# Patient Record
Sex: MALE | Race: WHITE
[De-identification: names, ages, dates, MRNs, and addresses within clinical notes are randomized per-mention and may not be internally consistent; named-entity substitution may affect disease eponyms.]

---

## 2019-10-01 ENCOUNTER — HOSPITAL ENCOUNTER (OUTPATIENT)
Dept: HOSPITAL 53 - M RAD | Age: 64
End: 2019-10-01
Attending: INTERNAL MEDICINE
Payer: MEDICARE

## 2019-10-01 DIAGNOSIS — R91.8: Primary | ICD-10-CM

## 2019-10-01 NOTE — REP
The CT of the chest without IV contrast:

Comparisons are the radionuclide PET scan dated 01/23/2018 and chest CT dated

04/03/2018.

On the comparison PET scan there was a hypermetabolic lung nodule medially in the

apex of the right upper lobe.

On the 04/03/2018 chest CT this nodule had been resected and there is a surgical

staple line medially in the right upper lobe apex.  Additionally there are

multiple bilateral pulmonary emboli.

The study today is insensitive for evaluation of pulmonary emboli in the absence

of IV contrast.

There is a surgical staple line medially in the apex of the right upper lobe.

There is no evidence of tumor recurrence.

In addition, there is linear stranding extending from the lateral pleural surface

in the right upper lobe, eight packs inferomedially to the right hilus, not

present previously, possibly parenchymal scarring.  No nodularity is identified.

 

There are no nodules or masses otherwise.  There are no infiltrates or pleural

effusions.

There are numerous bulla replacing the lung parenchyma bilaterally.

There is no mediastinal or axillary lymphadenopathy.  The study is insensitive

for hilar lymphadenopathy in the absence of IV contrast.

The thoracic aorta is unremarkable except for occasional calcified atheroma.

Cardiac size is normal.  There is no pericardial effusion.

Upper abdomen:

On the radionuclide PET scan dated 01/23/2089.  On the chest CT of 04/03/2018

there was a right adrenal hypodense nodule.  This is only partially imaged on the

study today.

The left adrenal demonstrates "fullness" but is only partially imaged.  This

fullness was also present on the comparison studies.

The visualized areas of the liver, gallbladder, pancreas and spleen are

unremarkable.

 

Impression:

The there is a surgical staple line in the upper lobe of the right lung medially

where a lung nodule had previously been resected.  There is no evidence of tumor

recurrence.

There is now linear stranding extending from the lateral pleura in the right

upper lobe toward the right hilus, not present previously, likely parenchymal

scarring.  No nodularity is associated with this linear stranding.

There are no new lung nodules or masses.  There are no infiltrates or pleural

effusions.  There is no adenopathy, however the study is insensitive for

evaluation of the paul in the absence of IV contrast.

There are numerous bulla replacing the lung parenchyma bilaterally.

 There is curvilinear parenchymal scarring in the left lower lobe.

The patient has a known right adrenal nodule. It is only partially imaged on the

current study.  There is left adrenal fullness, only partially imaged on the

current study.  This fullness was also present on  prior studies.

 

 

 

Electronically Signed by

Ady Slade MD 10/01/2019 01:41 P

## 2019-12-02 ENCOUNTER — HOSPITAL ENCOUNTER (OUTPATIENT)
Dept: HOSPITAL 53 - M LAB REF | Age: 64
End: 2019-12-02
Attending: DERMATOLOGY
Payer: MEDICARE

## 2019-12-02 DIAGNOSIS — D22.61: ICD-10-CM

## 2019-12-02 DIAGNOSIS — L57.8: ICD-10-CM

## 2019-12-02 DIAGNOSIS — D22.5: Primary | ICD-10-CM

## 2020-06-24 ENCOUNTER — HOSPITAL ENCOUNTER (OUTPATIENT)
Dept: HOSPITAL 53 - M WUC | Age: 65
End: 2020-06-24
Attending: PHYSICIAN ASSISTANT
Payer: MEDICARE

## 2020-06-24 DIAGNOSIS — Z00.00: Primary | ICD-10-CM

## 2020-06-24 LAB
ALBUMIN SERPL BCG-MCNC: 3.7 GM/DL (ref 3.2–5.2)
ALT SERPL W P-5'-P-CCNC: 20 U/L (ref 12–78)
BASOPHILS # BLD AUTO: 0.1 10^3/UL (ref 0–0.2)
BASOPHILS NFR BLD AUTO: 0.9 % (ref 0–1)
BILIRUB SERPL-MCNC: 0.8 MG/DL (ref 0.2–1)
BUN SERPL-MCNC: 19 MG/DL (ref 7–18)
CALCIUM SERPL-MCNC: 8.8 MG/DL (ref 8.8–10.2)
CHLORIDE SERPL-SCNC: 112 MEQ/L (ref 98–107)
CHOLEST SERPL-MCNC: 180 MG/DL (ref ?–200)
CHOLEST/HDLC SERPL: 4.09 {RATIO} (ref ?–5)
CO2 SERPL-SCNC: 24 MEQ/L (ref 21–32)
CREAT SERPL-MCNC: 1.04 MG/DL (ref 0.7–1.3)
EOSINOPHIL # BLD AUTO: 0.1 10^3/UL (ref 0–0.5)
EOSINOPHIL NFR BLD AUTO: 1.6 % (ref 0–3)
GFR SERPL CREATININE-BSD FRML MDRD: > 60 ML/MIN/{1.73_M2} (ref 49–?)
GLUCOSE SERPL-MCNC: 101 MG/DL (ref 70–100)
HCT VFR BLD AUTO: 45.3 % (ref 42–52)
HDLC SERPL-MCNC: 44 MG/DL (ref 40–?)
HGB BLD-MCNC: 16 G/DL (ref 13.5–17.5)
LDLC SERPL CALC-MCNC: 121 MG/DL (ref ?–100)
LYMPHOCYTES # BLD AUTO: 1.3 10^3/UL (ref 1.5–5)
LYMPHOCYTES NFR BLD AUTO: 18.1 % (ref 24–44)
MCH RBC QN AUTO: 34 PG (ref 27–33)
MCHC RBC AUTO-ENTMCNC: 35.3 G/DL (ref 32–36.5)
MCV RBC AUTO: 96.2 FL (ref 80–96)
MONOCYTES # BLD AUTO: 0.8 10^3/UL (ref 0–0.8)
MONOCYTES NFR BLD AUTO: 11.1 % (ref 0–5)
NEUTROPHILS # BLD AUTO: 4.8 10^3/UL (ref 1.5–8.5)
NEUTROPHILS NFR BLD AUTO: 68 % (ref 36–66)
NONHDLC SERPL-MCNC: 136 MG/DL
PLATELET # BLD AUTO: 219 10^3/UL (ref 150–450)
POTASSIUM SERPL-SCNC: 4.1 MEQ/L (ref 3.5–5.1)
PROT SERPL-MCNC: 6.9 GM/DL (ref 6.4–8.2)
RBC # BLD AUTO: 4.71 10^6/UL (ref 4.3–6.1)
SODIUM SERPL-SCNC: 142 MEQ/L (ref 136–145)
TRIGL SERPL-MCNC: 73 MG/DL (ref ?–150)
WBC # BLD AUTO: 7 10^3/UL (ref 4–10)

## 2020-10-07 ENCOUNTER — HOSPITAL ENCOUNTER (OUTPATIENT)
Dept: HOSPITAL 53 - M RAD | Age: 65
End: 2020-10-07
Attending: INTERNAL MEDICINE
Payer: MEDICARE

## 2020-10-07 DIAGNOSIS — R91.8: Primary | ICD-10-CM

## 2020-10-12 NOTE — REP
CT CHEST WITHOUT CONTRAST



HISTORY: Other nonspecific abnormal finding of lung field. 



COMPARISON: Chest CT studies from 10/01/2019 and 04/03/2018. PET CT study 
01/23/2018. 



CT FINDINGS: 

The patient is status post right thoracotomy and resection of right upper lobe 
pulmonary nodule. There are fibrotic changes in the right upper lobe and a 
suture line is seen superiorly and medially. There are fairly advanced 
emphysematous changes again noted. Bibasilar linear fibrotic changes are 
visible. There is no evidence of pleural effusion or pericardial effusion. 
Vascular calcification is noted. No mediastinal mass or adenopathy is seen. No 
hilar adenopathy is observed. Stable benign adrenal adenoma is noted on the 
right. Some stable fullness in the left adrenal gland is seen. These are 
unchanged. 



There is an area of pleura-based opacity in the right posterior lung gutter 2.3 
cm in greatest diameter. This was not apparent on the 10/01/2019 prior study. 
There is some adjacent infiltrate superior to this and these changes could be 
inflammatory. There is mild pleural thickening on the right. There is a pleural-
based nodule in the left lower lobe 9 mm in diameter. This was present on 
10/01/2019, but is a little larger. Previously, it measured 6 mm. No other 
nodular density is seen. There is a granulomatous calcification in the right 
upper lobe anteriorly. Stable perifissural nodule is seen in the minor fissure. 
 



IMPRESSION: 

There are two nodular opacities of concern. A new 2.3 cm somewhat nodular 
peripheral density is seen in the posterior lung gutter on the right question 
inflammatory change. In addition, there is a posterior pleural-based 9 mm nodule
in the left lower lobe, which is somewhat larger than on the 10/01/2019 study. 
Evidence of advanced chronic obstructive pulmonary disease (COPD). Postoperative
changes on the right.  

MTDD

## 2020-10-16 ENCOUNTER — HOSPITAL ENCOUNTER (INPATIENT)
Dept: HOSPITAL 53 - M ED | Age: 65
LOS: 3 days | Discharge: HOME | DRG: 193 | End: 2020-10-19
Attending: FAMILY MEDICINE | Admitting: INTERNAL MEDICINE
Payer: MEDICARE

## 2020-10-16 VITALS — SYSTOLIC BLOOD PRESSURE: 148 MMHG | DIASTOLIC BLOOD PRESSURE: 96 MMHG

## 2020-10-16 VITALS — DIASTOLIC BLOOD PRESSURE: 72 MMHG | SYSTOLIC BLOOD PRESSURE: 107 MMHG

## 2020-10-16 VITALS — BODY MASS INDEX: 22.95 KG/M2 | HEIGHT: 70 IN | WEIGHT: 160.34 LBS

## 2020-10-16 DIAGNOSIS — F17.200: ICD-10-CM

## 2020-10-16 DIAGNOSIS — Z86.711: ICD-10-CM

## 2020-10-16 DIAGNOSIS — Z90.49: ICD-10-CM

## 2020-10-16 DIAGNOSIS — J18.9: Primary | ICD-10-CM

## 2020-10-16 DIAGNOSIS — Z98.49: ICD-10-CM

## 2020-10-16 DIAGNOSIS — I50.9: ICD-10-CM

## 2020-10-16 DIAGNOSIS — Z20.828: ICD-10-CM

## 2020-10-16 DIAGNOSIS — J44.0: ICD-10-CM

## 2020-10-16 DIAGNOSIS — J96.91: ICD-10-CM

## 2020-10-16 DIAGNOSIS — Z90.2: ICD-10-CM

## 2020-10-16 DIAGNOSIS — Z79.899: ICD-10-CM

## 2020-10-16 DIAGNOSIS — J90: ICD-10-CM

## 2020-10-16 LAB
ALBUMIN SERPL BCG-MCNC: 2.3 GM/DL (ref 3.2–5.2)
ALT SERPL W P-5'-P-CCNC: 35 U/L (ref 12–78)
BASE EXCESS BLDV CALC-SCNC: -1.9 MMOL/L (ref -2–2)
BASOPHILS # BLD AUTO: 0.1 10^3/UL (ref 0–0.2)
BASOPHILS NFR BLD AUTO: 0.4 % (ref 0–1)
BILIRUB CONJ SERPL-MCNC: 0.9 MG/DL (ref 0–0.2)
BILIRUB SERPL-MCNC: 1.5 MG/DL (ref 0.2–1)
CO2 BLDV CALC-SCNC: 21.1 MEQ/L (ref 24–28)
EOSINOPHIL # BLD AUTO: 0 10^3/UL (ref 0–0.5)
EOSINOPHIL NFR BLD AUTO: 0.2 % (ref 0–3)
HCO3 BLDV-SCNC: 20.3 MEQ/L (ref 23–27)
HCO3 STD BLDV-SCNC: 22.9 MEQ/L
HCT VFR BLD AUTO: 37.4 % (ref 42–52)
HGB BLD-MCNC: 13.1 G/DL (ref 13.5–17.5)
INR PPP: 1.13
LYMPHOCYTES # BLD AUTO: 0.3 10^3/UL (ref 1.5–5)
LYMPHOCYTES NFR BLD AUTO: 2.6 % (ref 24–44)
MCH RBC QN AUTO: 32.7 PG (ref 27–33)
MCHC RBC AUTO-ENTMCNC: 35 G/DL (ref 32–36.5)
MCV RBC AUTO: 93.3 FL (ref 80–96)
MONOCYTES # BLD AUTO: 1.2 10^3/UL (ref 0–0.8)
MONOCYTES NFR BLD AUTO: 9.4 % (ref 0–5)
NEUTROPHILS # BLD AUTO: 11.5 10^3/UL (ref 1.5–8.5)
NEUTROPHILS NFR BLD AUTO: 86.7 % (ref 36–66)
NT-PRO BNP: 612 PG/ML (ref ?–125)
PCO2 BLDV: 28.1 MMHG (ref 38–50)
PH BLDV: 7.48 UNITS (ref 7.33–7.43)
PLATELET # BLD AUTO: 344 10^3/UL (ref 150–450)
PO2 BLDV: 140 MMHG (ref 30–50)
PROT SERPL-MCNC: 6 GM/DL (ref 6.4–8.2)
PROTHROMBIN TIME: 14.7 SECONDS (ref 12.5–14.3)
RBC # BLD AUTO: 4.01 10^6/UL (ref 4.3–6.1)
SAO2 % BLDV: 98.9 % (ref 60–80)
TSH SERPL DL<=0.005 MIU/L-ACNC: 0.48 UIU/ML (ref 0.36–3.74)
WBC # BLD AUTO: 13.3 10^3/UL (ref 4–10)

## 2020-10-16 RX ADMIN — CEFTRIAXONE SCH MLS/HR: 2 INJECTION, POWDER, FOR SOLUTION INTRAMUSCULAR; INTRAVENOUS at 14:15

## 2020-10-16 RX ADMIN — DEXTROSE MONOHYDRATE SCH MLS/HR: 5 INJECTION INTRAVENOUS at 15:15

## 2020-10-16 NOTE — REPVR
PROCEDURE INFORMATION: 

Exam: XR Chest, 1 View 

Exam date and time: 10/16/2020 12:18 PM 

Age: 65 years old 

Clinical indication: Shortness of breath; Additional info: Dyspnea/cough 



TECHNIQUE: 

Imaging protocol: XR of the chest 

Views: 1 view. 



COMPARISON: 

CT Chest without contrast 10/7/2020 12:58 PM 



FINDINGS: 

Limitations: Multiple EKG leads are superimposed on the chest. 



Lungs: The lungs are hyperinflated, consistent with underlying small airways 

disease. Subsegmental atelectasis is noted at the bases. There are prominent 

Kerley B lines suggesting interstitial edema. 

Pleural space: There is a small right pleural fluid collection present. 

Heart/Mediastinum: See "Soft tissues" finding. 

Bones/joints: Unremarkable. 

Soft tissues: An opacity of the right chest wall adjacent to the 4th rib shadow 

is related to chronic scarring and visible on previous  image and CT 

images. There is also evidence of surgery centrally at the right mediastinal 

margin.There is nonspecific consolidation in the right lung base, consistent 

with atelectasis or pneumonia. 



IMPRESSION: 

1. There is a small right pleural fluid collection present. 

2. There are prominent Kerley B lines suggesting interstitial edema. 

3. An opacity of the right chest wall adjacent to the 4th rib shadow is related 

to chronic scarring and visible on previous  image and CT images. There is 

also evidence of surgery centrally at the right mediastinal margin.There is 

nonspecific consolidation in the right lung base, consistent with atelectasis 

or pneumonia. 



Electronically signed by: Brando Dunlap On 10/16/2020  12:37:24 PM
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
## 2020-10-17 VITALS — SYSTOLIC BLOOD PRESSURE: 128 MMHG | DIASTOLIC BLOOD PRESSURE: 62 MMHG

## 2020-10-17 VITALS — DIASTOLIC BLOOD PRESSURE: 66 MMHG | SYSTOLIC BLOOD PRESSURE: 123 MMHG

## 2020-10-17 VITALS — DIASTOLIC BLOOD PRESSURE: 71 MMHG | SYSTOLIC BLOOD PRESSURE: 134 MMHG

## 2020-10-17 LAB
BUN SERPL-MCNC: 30 MG/DL (ref 7–18)
CALCIUM SERPL-MCNC: 9.1 MG/DL (ref 8.8–10.2)
CHLORIDE SERPL-SCNC: 110 MEQ/L (ref 98–107)
CO2 SERPL-SCNC: 23 MEQ/L (ref 21–32)
CREAT SERPL-MCNC: 0.74 MG/DL (ref 0.7–1.3)
GFR SERPL CREATININE-BSD FRML MDRD: > 60 ML/MIN/{1.73_M2} (ref 49–?)
GLUCOSE SERPL-MCNC: 151 MG/DL (ref 70–100)
HCT VFR BLD AUTO: 38.5 % (ref 42–52)
HGB BLD-MCNC: 13 G/DL (ref 13.5–17.5)
MAGNESIUM SERPL-MCNC: 1.9 MG/DL (ref 1.8–2.4)
MCH RBC QN AUTO: 32.2 PG (ref 27–33)
MCHC RBC AUTO-ENTMCNC: 33.8 G/DL (ref 32–36.5)
MCV RBC AUTO: 95.3 FL (ref 80–96)
PLATELET # BLD AUTO: 388 10^3/UL (ref 150–450)
POTASSIUM SERPL-SCNC: 3.7 MEQ/L (ref 3.5–5.1)
RBC # BLD AUTO: 4.04 10^6/UL (ref 4.3–6.1)
SODIUM SERPL-SCNC: 141 MEQ/L (ref 136–145)
WBC # BLD AUTO: 20.3 10^3/UL (ref 4–10)

## 2020-10-17 RX ADMIN — DEXTROSE MONOHYDRATE SCH MLS/HR: 5 INJECTION INTRAVENOUS at 14:01

## 2020-10-17 RX ADMIN — TIOTROPIUM BROMIDE SCH INHALATION: 18 CAPSULE ORAL; RESPIRATORY (INHALATION) at 07:30

## 2020-10-17 RX ADMIN — CEFTRIAXONE SCH MLS/HR: 2 INJECTION, POWDER, FOR SOLUTION INTRAMUSCULAR; INTRAVENOUS at 14:01

## 2020-10-17 RX ADMIN — DEXTROSE MONOHYDRATE SCH MLS/HR: 5 INJECTION INTRAVENOUS at 02:42

## 2020-10-17 RX ADMIN — ENOXAPARIN SODIUM SCH MG: 40 INJECTION SUBCUTANEOUS at 09:56

## 2020-10-17 RX ADMIN — FUROSEMIDE SCH MG: 40 TABLET ORAL at 09:55

## 2020-10-17 NOTE — IPNPDOC
Text Note


Date of Service


The patient was seen on 10/17/20.





NOTE


Subjective:


-Cough persists, however now back on room air


-Feels better





Objective:


General: Alert, No Acute Distress


Eye: PERRLA, Conjunctiva & lids normal, EOMI, anicteric


ENT: Atraumatic, Mucous membr. moist/pink, Pharynx Normal


Neck: Supple


Chest : Normal air movement, Rhonchi, diminished R base, no hoang crackles, no 

wheezing


Heart: Rate Normal, Regular Rhythm, Normal S1, Normal S2, systolic murmur


Abdomen: Normal bowel sounds, soft, NTND


Extremities: Normal pulses, WWP, no edema


Skin: Nl turgor and temperature, no rashes or breakdown


Neuro: Normal Speech, Cranial Nerves 3-12 NL, nonfocal grossly


Psych:  Alert and Oriented x 3








Laboratory Data


WBC 20.3 (after steroids in ED)


hgb 13


platelets 388


Cr 0.74





Microbiology


10/16/20 Respiratory Virus Panel (PCR) (LIT) - Final, Complete - negative


           


10/16/20 Blood Culture, NGTD


           


10/16/20 Blood Culture, NGTD





Assessment:


64 yo man long term smoker who has recently cut down, with COPD, history of a 

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 

provoked PE thereafter that was treated with short duration eliquis, who follows

with Dr. Echeverria in the outpatient setting who recently presented to clinic with 

cough and SOB and diagnosed with CAP and Dr. Echeverria had placed him on a 

quinolone but did not improve now being admitted for CAP for IV antibiotics 

having failed outpatient therapy.





CAP: SOB, cough, hypoxemia, CXR with RLL opacity and leukocytosis


-ceftriaxone/doxy, day 2


-respiratory panel was negative


-MRSA negative


-sputum cx if he expectorates


-incentive spirometry


-continue home metered dose inhalers


-hold off on steroids after ED's solumedrol


-f/u legionella and strep pneumo antigens


-supplemental oxygen, with goal saturation >88%


-add guaifenesin for cough





Hypoxemic respiratory failure: 2/2 ongoing CAP and CHF: resolved. now back on 

room air





Newly noted mild CHF: Elevated proBNP, CXR with interstitial edema, crackles on 

initial ED examination


-EKG was per baseline and non ischemic


-Troponin was negative


-s/p lasix 20 IV


-continue lasix 40 PO daily


-strict I/Os


-daily weight





Nicotine addiction:


-has cut down





DVT ppx: lovenox


Dispo: inpatient, med/surg





VS,Fishbone, I+O


VS, Fishbone, I+O


Laboratory Tests


10/16/20 11:52








10/17/20 07:09











Vital Signs








  Date Time  Temp Pulse Resp B/P (MAP) Pulse Ox O2 Delivery O2 Flow Rate FiO2


 


10/17/20 06:00 98.4 75 20 123/66 (85) 93 Room Air  














I&O- Last 24 Hours up to 6 AM 


 


 10/17/20





 06:00


 


Intake Total 1585 ml


 


Output Total 700 ml


 


Balance 885 ml

















ALYSSA REHMAN MD   Oct 17, 2020 09:11

## 2020-10-17 NOTE — ECGEPIP
[facility] - ED

                                       

                                       Test Date:    2020-10-16

Pat Name:     MONSERRAT HARKINS          Department:   

Patient ID:   [id number]                 Room:         -

Gender:       Male                     Technician:   

:          [id number]               Requested By: Janette Hodge 

Order Number: [id number]     Reading MD:   Glenn Cason

                                 Measurements

Intervals                              Axis          

Rate:         105                      P:            75

VT:           167                      QRS:          32

QRSD:         85                       T:            49

QT:           314                                    

QTc:          417                                    

                           Interpretive Statements

SINUS TACHYCARDIA

Low QRS complex voltage in the limb leads

Delayed anterior R wave progression

Similar to tracing done 17

Electronically Signed on 10- 7:45:08 EDT by Glenn Cason

## 2020-10-18 VITALS — SYSTOLIC BLOOD PRESSURE: 144 MMHG | DIASTOLIC BLOOD PRESSURE: 73 MMHG

## 2020-10-18 VITALS — DIASTOLIC BLOOD PRESSURE: 85 MMHG | SYSTOLIC BLOOD PRESSURE: 138 MMHG

## 2020-10-18 VITALS — DIASTOLIC BLOOD PRESSURE: 67 MMHG | SYSTOLIC BLOOD PRESSURE: 128 MMHG

## 2020-10-18 LAB
BUN SERPL-MCNC: 32 MG/DL (ref 7–18)
CALCIUM SERPL-MCNC: 8.7 MG/DL (ref 8.8–10.2)
CHLORIDE SERPL-SCNC: 111 MEQ/L (ref 98–107)
CO2 SERPL-SCNC: 24 MEQ/L (ref 21–32)
CREAT SERPL-MCNC: 0.75 MG/DL (ref 0.7–1.3)
GFR SERPL CREATININE-BSD FRML MDRD: > 60 ML/MIN/{1.73_M2} (ref 49–?)
GLUCOSE SERPL-MCNC: 91 MG/DL (ref 70–100)
HCT VFR BLD AUTO: 39.7 % (ref 42–52)
HGB BLD-MCNC: 13.5 G/DL (ref 13.5–17.5)
MCH RBC QN AUTO: 32.6 PG (ref 27–33)
MCHC RBC AUTO-ENTMCNC: 34 G/DL (ref 32–36.5)
MCV RBC AUTO: 95.9 FL (ref 80–96)
PLATELET # BLD AUTO: 427 10^3/UL (ref 150–450)
POTASSIUM SERPL-SCNC: 3.7 MEQ/L (ref 3.5–5.1)
RBC # BLD AUTO: 4.14 10^6/UL (ref 4.3–6.1)
SODIUM SERPL-SCNC: 141 MEQ/L (ref 136–145)
WBC # BLD AUTO: 11.9 10^3/UL (ref 4–10)

## 2020-10-18 RX ADMIN — CEFUROXIME AXETIL SCH MG: 500 TABLET ORAL at 20:44

## 2020-10-18 RX ADMIN — TIOTROPIUM BROMIDE SCH INHALATION: 18 CAPSULE ORAL; RESPIRATORY (INHALATION) at 07:24

## 2020-10-18 RX ADMIN — FUROSEMIDE SCH MG: 40 TABLET ORAL at 09:05

## 2020-10-18 RX ADMIN — DEXTROSE MONOHYDRATE SCH MLS/HR: 5 INJECTION INTRAVENOUS at 03:54

## 2020-10-18 RX ADMIN — CEFUROXIME AXETIL SCH MG: 500 TABLET ORAL at 11:11

## 2020-10-18 RX ADMIN — DOXYCYCLINE HYCLATE SCH MG: 100 TABLET, COATED ORAL at 20:45

## 2020-10-18 RX ADMIN — ENOXAPARIN SODIUM SCH MG: 40 INJECTION SUBCUTANEOUS at 09:08

## 2020-10-18 RX ADMIN — DOXYCYCLINE HYCLATE SCH MG: 100 TABLET, COATED ORAL at 09:05

## 2020-10-18 NOTE — IPNPDOC
Text Note


Date of Service


The patient was seen on 10/18/20.





NOTE


Subjective:


-Feels much better, cough is better, remains dry


-Now on room air and breathing comfortably





Objective:


General: Alert, No Acute Distress


Eye: PERRLA, Conjunctiva & lids normal, EOMI, anicteric


ENT: Atraumatic, Mucous membr. moist/pink, Pharynx Normal


Neck: Supple


Chest : Normal air movement, Rhonchi, diminished R base, no hoang crackles, no 

wheezing


Heart: Rate Normal, Regular Rhythm, Normal S1, Normal S2, systolic murmur


Abdomen: Normal bowel sounds, soft, NTND


Extremities: Normal pulses, WWP, no edema


Skin: Nl turgor and temperature, no rashes or breakdown


Neuro: Normal Speech, Cranial Nerves 3-12 NL, nonfocal grossly


Psych:  Alert and Oriented x 3








Laboratory Data


WBC 11.9


hgb 135


platelets 427


Cr 0.75





Microbiology


10/16/20 Respiratory Virus Panel (PCR) (LIT) - Final, Complete - negative


           


10/16/20 Blood Culture, NGTD


           


10/16/20 Blood Culture, NGTD





Assessment:


66 yo man long term smoker who has recently cut down, with COPD, history of a 

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 

provoked PE thereafter that was treated with short duration eliquis, who follows

with Dr. Echeverria in the outpatient setting who recently presented to clinic with 

cough and SOB and diagnosed with CAP and Dr. Echeverria had placed him on a 

quinolone but did not improve now being admitted for CAP for IV antibiotics 

having failed outpatient therapy.





CAP: SOB, cough, hypoxemia, CXR with RLL opacity and leukocytosis


-DC IV ceftriaxone/doxy and switch to ceftin/doxy PO. Day 3 of abx


-respiratory panel was negative


-MRSA negative


-sputum cx if he expectorates


-incentive spirometry


-continue home metered dose inhalers


-hold off on steroids after ED's solumedrol


-f/u legionella and strep pneumo antigens


-supplemental oxygen, with goal saturation >88%


-guaifenesin for cough





Hypoxemic respiratory failure: 2/2 ongoing CAP and CHF: resolved. now back on 

room air





Newly noted mild CHF: Elevated proBNP, CXR with interstitial edema, crackles on 

initial ED examination


-EKG was per baseline and non ischemic


-Troponin was negative


-s/p lasix 20 IV


-continue lasix 40 PO daily


-strict I/Os


-daily weight





Nicotine addiction:


-has cut down





DVT ppx: lovenox


Dispo: inpatient, med/surg. Likely DC home tomorrow after PT home safety eval 

and ambulatory saturation. Plan will be to complete a course of ceftin/doxy.





VS,Fishbone, I+O


VS, Fishbone, I+O


Laboratory Tests


10/18/20 06:59











Vital Signs








  Date Time  Temp Pulse Resp B/P (MAP) Pulse Ox O2 Delivery O2 Flow Rate FiO2


 


10/18/20 06:00 98.1 90 18 138/85 (102) 92 Room Air  


 


10/17/20 06:00        














I&O- Last 24 Hours up to 6 AM 


 


 10/18/20





 06:00


 


Intake Total 1920 ml


 


Output Total 1100 ml


 


Balance 820 ml

















ALYSSA REHMAN MD   Oct 18, 2020 08:38

## 2020-10-19 VITALS — SYSTOLIC BLOOD PRESSURE: 111 MMHG | DIASTOLIC BLOOD PRESSURE: 60 MMHG

## 2020-10-19 LAB
BACTERIA ID TEST ISLT QL CULT: (no result)
BACTERIA SPEC BFLD CULT: (no result)
BUN SERPL-MCNC: 26 MG/DL (ref 7–18)
CALCIUM SERPL-MCNC: 8.5 MG/DL (ref 8.8–10.2)
CHLORIDE SERPL-SCNC: 110 MEQ/L (ref 98–107)
CO2 SERPL-SCNC: 25 MEQ/L (ref 21–32)
CREAT SERPL-MCNC: 0.74 MG/DL (ref 0.7–1.3)
GFR SERPL CREATININE-BSD FRML MDRD: > 60 ML/MIN/{1.73_M2} (ref 49–?)
GLUCOSE SERPL-MCNC: 97 MG/DL (ref 70–100)
HCT VFR BLD AUTO: 40.5 % (ref 42–52)
HGB BLD-MCNC: 13.9 G/DL (ref 13.5–17.5)
MCH RBC QN AUTO: 32.8 PG (ref 27–33)
MCHC RBC AUTO-ENTMCNC: 34.3 G/DL (ref 32–36.5)
MCV RBC AUTO: 95.5 FL (ref 80–96)
PLATELET # BLD AUTO: 434 10^3/UL (ref 150–450)
POTASSIUM SERPL-SCNC: 4.2 MEQ/L (ref 3.5–5.1)
RBC # BLD AUTO: 4.24 10^6/UL (ref 4.3–6.1)
SODIUM SERPL-SCNC: 141 MEQ/L (ref 136–145)
WBC # BLD AUTO: 11.2 10^3/UL (ref 4–10)

## 2020-10-19 RX ADMIN — CEFUROXIME AXETIL SCH MG: 500 TABLET ORAL at 09:18

## 2020-10-19 RX ADMIN — DOXYCYCLINE HYCLATE SCH MG: 100 TABLET, COATED ORAL at 09:18

## 2020-10-19 RX ADMIN — TIOTROPIUM BROMIDE SCH INHALATION: 18 CAPSULE ORAL; RESPIRATORY (INHALATION) at 07:07

## 2020-10-19 RX ADMIN — ENOXAPARIN SODIUM SCH MG: 40 INJECTION SUBCUTANEOUS at 09:16

## 2020-10-19 RX ADMIN — FUROSEMIDE SCH MG: 40 TABLET ORAL at 09:17

## 2020-10-19 NOTE — DS
DATE OF ADMISSION:   10/16/2020

DATE OF DISCHARGE:   10/19/2020 



PRINCIPAL DIAGNOSIS:  Community-acquired pneumonia.



SECONDARY DIAGNOSES: 

* Mild congestive heart failure. 

* Hypoxemic respiratory failure requiring supplemental oxygen. 

* Nicotine abuse. 



PRIMARY CARE PROVIDER: Dr. Natalia Ander. 



HISTORY OF PRESENT ILLNESS: Adams Barron was admitted with acute community-
acquired pneumonia. Details are in the History and Physical from admission. 



HOSPITAL COURSE: The patient was admitted to a medical bed, treated with 
intravenous (IV) Rocephin and doxycycline, and he responded well to this. He had
some Kerley B lines on his chest x-ray for which he was given oral diuresis with
furosemide 40 mg daily. On the day of discharge, he feels ready to go home. He 
denies any chest pain or shortness of breath. He has walked in the hallway 
without any difficulty. His O2 saturation on room air is 93% at rest. I am still
waiting to hear back what with exertional it was, and I will dictate an addendum
if it was 88% or lower; essentially, he would require some exertional 
supplemental oxygen.  



DISCHARGE PHYSICAL EXAMINATION: 

GENERAL APPEARANCE: On the day of discharge, he is resting comfortably in no 
distress. Alert, conversant, and in no distress. 

VITAL SIGNS: Blood pressure 112/62, pulse 86, respiratory rate 18, O2 saturation
93%, afebrile 97.9 degrees. 

NECK: No jugular venous distention (JVD). 

LUNGS: Entirely clear. 

HEART: Regular rhythm. 

ABDOMEN: Soft and nontender. 

EXTREMITIES: No peripheral edema. 



LABORATORY DATA: Sodium 141, potassium 4.2, BUN 26, creatinine 0.7, glucose 97. 
White count 11.2, hemoglobin 13.9, platelets 434,000. The rapid urine for 
Legionella and strep pneumo that was ordered three days ago is still pending. 
Sputum cultures were negative. Blood cultures were negative. Respiratory panel 
was negative. 



DISPOSITION: The patient is discharged home in improved and stable condition. He
will follow-up with Dr. Natalia Andre in a week. His activity is as tolerated. 



DISCHARGE MEDICATIONS: 

* Albuterol 2 puffs q. 4 hours p.r.n. 

* Spiriva 1 inhalation daily. 

* Ceftin 500 mg b.i.d. for 5 days.

* Doxycycline 100 mg b.i.d. for 5 days. 



Stopping the furosemide as it does not look like he is in much congestive heart 
failure and there are no signs of this on exam.

ELLIS

## 2020-12-16 ENCOUNTER — HOSPITAL ENCOUNTER (OUTPATIENT)
Dept: HOSPITAL 53 - M RAD | Age: 65
End: 2020-12-16
Attending: INTERNAL MEDICINE
Payer: MEDICARE

## 2020-12-16 DIAGNOSIS — J44.9: ICD-10-CM

## 2020-12-16 DIAGNOSIS — R91.8: Primary | ICD-10-CM

## 2020-12-16 NOTE — REP
INDICATION:

ABNORMAL FINDING OF LUNG FIELD.



COMPARISON:

Comparison CT studies October 7, 2020 and October 1, 2019..



TECHNIQUE:

Helical scanning is acquired.  3 mm axial images are generated.  Coronal and sagittal

MPR and coronal MIP images are generated.



FINDINGS:

The previously noted pleural-based nodular opacity in the left lower lobe is again

seen measuring 7 mm today and, 8.6 mm previously.  This is essentially unchanged from

most recent study of October 7, 2020.  There is now a large opacity in the posterior

aspect of the right lower lobe consistent with an area of consolidation.  There are

inspissated endobronchial secretions in the right lower lobe and some bilateral lower

lobe bronchiectasis is present.  There is a small amount of right pleural fluid and

there is visceral and parietal pleural thickening adjacent to the fluid in the right

base consistent with proteinaceous etiology.  There are emphysematous changes as

before.  There is right apical pleuroparenchymal fibrosis which is unchanged.  No new

pulmonary nodule is seen.  Vascular calcification is noted.  No hilar or mediastinal

adenopathy is appreciated.  Low-density thickening of both adrenal glands is seen

unchanged from remote prior CT study of April 3, 2018 and consistent with adrenal

hyperplasia.  There are granulomatous calcifications in the liver.  There are stable

lymph nodes in the epicardial fat anterior to the cardiac silhouette.



IMPRESSION:

Progressive infiltrate like opacity right lower lobe with adjacent pleural effusion.

There is somewhat nodular visceral and parietal pleural thickening in the pleural

fluid collection indicating a proteinaceous see etiology.  Stable left lower lobe

pulmonary nodule.  Evidence of COPD unchanged.





<Electronically signed by Phill Loo > 12/16/20 2258

## 2020-12-24 ENCOUNTER — HOSPITAL ENCOUNTER (OUTPATIENT)
Dept: HOSPITAL 53 - M LAB REF | Age: 65
End: 2020-12-24
Attending: INTERNAL MEDICINE
Payer: MEDICARE

## 2020-12-24 DIAGNOSIS — R91.8: Primary | ICD-10-CM

## 2020-12-24 LAB
APTT BLD: 32.8 SECONDS (ref 24.2–38.5)
INR PPP: 0.95
PLATELET # BLD AUTO: 342 10^3/UL (ref 150–450)
PROTHROMBIN TIME: 12.9 SECONDS (ref 12.5–14.3)

## 2021-01-18 ENCOUNTER — HOSPITAL ENCOUNTER (OUTPATIENT)
Dept: HOSPITAL 53 - M IRPRO | Age: 66
End: 2021-01-18
Attending: INTERNAL MEDICINE
Payer: MEDICARE

## 2021-01-18 VITALS — SYSTOLIC BLOOD PRESSURE: 139 MMHG | DIASTOLIC BLOOD PRESSURE: 66 MMHG

## 2021-01-18 DIAGNOSIS — Z53.8: ICD-10-CM

## 2021-01-18 DIAGNOSIS — J90: Primary | ICD-10-CM

## 2021-01-18 LAB
ALBUMIN SERPL BCG-MCNC: 3.1 GM/DL (ref 3.2–5.2)
ALT SERPL W P-5'-P-CCNC: 31 U/L (ref 12–78)
BASOPHILS # BLD AUTO: 0.1 10^3/UL (ref 0–0.2)
BASOPHILS NFR BLD AUTO: 0.7 % (ref 0–1)
BILIRUB SERPL-MCNC: 0.6 MG/DL (ref 0.2–1)
BUN SERPL-MCNC: 17 MG/DL (ref 7–18)
CALCIUM SERPL-MCNC: 9.7 MG/DL (ref 8.8–10.2)
CHLORIDE SERPL-SCNC: 109 MEQ/L (ref 98–107)
CO2 SERPL-SCNC: 23 MEQ/L (ref 21–32)
CREAT SERPL-MCNC: 0.85 MG/DL (ref 0.7–1.3)
EOSINOPHIL # BLD AUTO: 0.1 10^3/UL (ref 0–0.5)
EOSINOPHIL NFR BLD AUTO: 0.5 % (ref 0–3)
GFR SERPL CREATININE-BSD FRML MDRD: > 60 ML/MIN/{1.73_M2} (ref 49–?)
GLUCOSE SERPL-MCNC: 103 MG/DL (ref 70–100)
HCT VFR BLD AUTO: 43.3 % (ref 42–52)
HGB BLD-MCNC: 14.9 G/DL (ref 13.5–17.5)
LDH SERPL L TO P-CCNC: 107 U/L (ref 87–241)
LYMPHOCYTES # BLD AUTO: 0.8 10^3/UL (ref 1.5–5)
LYMPHOCYTES NFR BLD AUTO: 8.1 % (ref 24–44)
MCH RBC QN AUTO: 32.4 PG (ref 27–33)
MCHC RBC AUTO-ENTMCNC: 34.4 G/DL (ref 32–36.5)
MCV RBC AUTO: 94.1 FL (ref 80–96)
MONOCYTES # BLD AUTO: 1 10^3/UL (ref 0–0.8)
MONOCYTES NFR BLD AUTO: 9.7 % (ref 0–5)
NEUTROPHILS # BLD AUTO: 8.2 10^3/UL (ref 1.5–8.5)
NEUTROPHILS NFR BLD AUTO: 80.3 % (ref 36–66)
PLATELET # BLD AUTO: 285 10^3/UL (ref 150–450)
POTASSIUM SERPL-SCNC: 4.1 MEQ/L (ref 3.5–5.1)
PROT SERPL-MCNC: 7.3 GM/DL (ref 6.4–8.2)
RBC # BLD AUTO: 4.6 10^6/UL (ref 4.3–6.1)
SODIUM SERPL-SCNC: 139 MEQ/L (ref 136–145)
WBC # BLD AUTO: 10.2 10^3/UL (ref 4–10)

## 2021-01-19 NOTE — REP
INDICATION:

PLEURAL EFFUSION.



COMPARISON:

CT chest 12/16/2020.



TECHNIQUE:

Real-time sonographic evaluation of posteroinferior right pleural space performed

prior to a scheduled ultrasound-guided drainage of pleural fluid collection.



FINDINGS:

An oval area of complex fluid is identified which is measuring smaller than the CT

exam, 3.1 x 0.9 x 0.9 cm.  Measurements on the CT exam were 6.7 x 2.2 cm and therefore

the fluid collection appears to have decreased in size.



IMPRESSION:

Decreased size of complex fluid collection in the right posteroinferior pleural space,

as discussed above.  We consulted with Dr. Echeverria.  Ultrasound-guided drainage is

canceled at this time.





<Electronically signed by Ady Rosenberg > 01/19/21 0920

## 2021-01-21 ENCOUNTER — HOSPITAL ENCOUNTER (OUTPATIENT)
Dept: HOSPITAL 53 - M RAD | Age: 66
End: 2021-01-21
Attending: INTERNAL MEDICINE
Payer: MEDICARE

## 2021-01-21 DIAGNOSIS — R91.8: ICD-10-CM

## 2021-01-21 DIAGNOSIS — J43.1: ICD-10-CM

## 2021-01-21 DIAGNOSIS — J90: Primary | ICD-10-CM

## 2021-01-21 NOTE — REP
INDICATION:

ABN FINDINGS OF LUNG FEILD PLEURAL EFFUSION EMPH. Panlobular emphysema.



COMPARISON:

Comparison is made with prior CT studies from December 16, 2020 and October 7, 2020.

Chest CT from October 1, 2019 also reviewed.  3 April 2018 prior CT study is also

reviewed..



TECHNIQUE:

Helical scanning is acquired.  3 mm axial images are generated.  Coronal and sagittal

MPR and coronal MIP images are generated.



FINDINGS:

The previously noted pleural based left lower lobe nodule is again seen 7 mm in

greatest diameter unchanged from prior studies.  It measured 7 mm on the April 3, 2018

study.



The previously noted oval-shaped pleural-based masslike opacity in the right lower

lobe is again seen.  This is new compared to the October 7, 2020 study.  Its

morphologic appearance suggests the possibility of rounded atelectasis.  There is

adjacent visceral and parietal pleural thickening and irregularly shaped low-density

fluid collection apparently in the pleural space.  The thickness of the parenchymal

density component of the right lower lobe lesion has increased 2 2.9 cm in

anteroposterior span from 1.7 cm December 16, 2020.  The pleural fluid component is

felt to be unchanged in overall size.  The parenchymal component as a more oval

rounded configuration.  No hilar or mediastinal mass or adenopathy is observed.  I

note that CT study from April 3, 2018 was positive for pulmonary embolism.



No other new pulmonary nodule or mass lesion is apparent.  There are extensive

emphysematous changes.  Pleuroparenchymal scarring is seen in the right lung apex

unchanged.



IMPRESSION:

Evolving a pleural based masslike area of consolidation in the right lower lobe with

adjacent loculated complex thick-walled pleural fluid collection.  Differential

possibilities include spiral atelectasis, pulmonary infarction (Bray's hump),

pneumonia with parapneumonic pleural fluid collection, and neoplasm.  There is a

stable pleural based 7 mm nodule in the left lower lobe.  Bronchiectasis and

inspissated endobronchial secretions are again noted in the lower lobes.





<Electronically signed by Phill Loo > 01/21/21 [id number]

## 2021-02-01 ENCOUNTER — HOSPITAL ENCOUNTER (OUTPATIENT)
Dept: HOSPITAL 53 - M LAB REF | Age: 66
End: 2021-02-01
Attending: INTERNAL MEDICINE
Payer: MEDICARE

## 2021-02-01 DIAGNOSIS — Z79.01: ICD-10-CM

## 2021-02-01 DIAGNOSIS — R91.8: Primary | ICD-10-CM

## 2021-02-01 LAB
APTT BLD: 34.6 SECONDS (ref 24.2–38.5)
INR PPP: 0.97
PLATELET # BLD AUTO: 312 10^3/UL (ref 150–450)
PROTHROMBIN TIME: 13.1 SECONDS (ref 12.5–14.3)

## 2021-02-04 ENCOUNTER — HOSPITAL ENCOUNTER (OUTPATIENT)
Dept: HOSPITAL 53 - M IRPRO | Age: 66
End: 2021-02-04
Attending: INTERNAL MEDICINE
Payer: MEDICARE

## 2021-02-04 VITALS — DIASTOLIC BLOOD PRESSURE: 55 MMHG | SYSTOLIC BLOOD PRESSURE: 112 MMHG

## 2021-02-04 DIAGNOSIS — R91.8: ICD-10-CM

## 2021-02-04 DIAGNOSIS — J90: Primary | ICD-10-CM

## 2021-02-04 LAB
ALBUMIN SERPL BCG-MCNC: 3.5 GM/DL (ref 3.2–5.2)
ALT SERPL W P-5'-P-CCNC: 19 U/L (ref 12–78)
AMYLASE FLD-CCNC: 42 U/L
APPEARANCE FLD: (no result)
BASOPHILS # BLD AUTO: 0.1 10^3/UL (ref 0–0.2)
BASOPHILS NFR BLD AUTO: 0.6 % (ref 0–1)
BILIRUB SERPL-MCNC: 0.8 MG/DL (ref 0.2–1)
BUN SERPL-MCNC: 21 MG/DL (ref 7–18)
CALCIUM SERPL-MCNC: 10 MG/DL (ref 8.8–10.2)
CHLORIDE SERPL-SCNC: 110 MEQ/L (ref 98–107)
CO2 SERPL-SCNC: 24 MEQ/L (ref 21–32)
COLOR PLR: (no result)
CREAT SERPL-MCNC: 0.95 MG/DL (ref 0.7–1.3)
EOSINOPHIL # BLD AUTO: 0 10^3/UL (ref 0–0.5)
EOSINOPHIL NFR BLD AUTO: 0.3 % (ref 0–3)
GFR SERPL CREATININE-BSD FRML MDRD: > 60 ML/MIN/{1.73_M2} (ref 49–?)
GLUCOSE SERPL-MCNC: 96 MG/DL (ref 70–100)
HCT VFR BLD AUTO: 46.4 % (ref 42–52)
HGB BLD-MCNC: 15.4 G/DL (ref 13.5–17.5)
LDH FLD L TO P-CCNC: (no result) U/L
LDH SERPL L TO P-CCNC: 120 U/L (ref 87–241)
LYMPHOCYTES # BLD AUTO: 1.1 10^3/UL (ref 1.5–5)
LYMPHOCYTES NFR BLD AUTO: 10.6 % (ref 24–44)
MCH RBC QN AUTO: 32.2 PG (ref 27–33)
MCHC RBC AUTO-ENTMCNC: 33.2 G/DL (ref 32–36.5)
MCV RBC AUTO: 97.1 FL (ref 80–96)
MONOCYTES # BLD AUTO: 1.3 10^3/UL (ref 0–0.8)
MONOCYTES NFR BLD AUTO: 12.9 % (ref 0–5)
NEUTROPHILS # BLD AUTO: 7.6 10^3/UL (ref 1.5–8.5)
NEUTROPHILS NFR BLD AUTO: 75.3 % (ref 36–66)
PH FLD: (no result) UNITS
PLATELET # BLD AUTO: 237 10^3/UL (ref 150–450)
POTASSIUM SERPL-SCNC: 4 MEQ/L (ref 3.5–5.1)
PROT SERPL-MCNC: 7.6 GM/DL (ref 6.4–8.2)
RBC # BLD AUTO: 4.78 10^6/UL (ref 4.3–6.1)
SODIUM SERPL-SCNC: 142 MEQ/L (ref 136–145)
SPECIMEN SOURCE FLD: (no result)
WBC # BLD AUTO: 10.2 10^3/UL (ref 4–10)

## 2021-02-04 NOTE — REP
INDICATION:

OTHER NON SPECIFIC ABNORMAL FINDINGS OF LUNG FIELD.



COMPARISON:

None.



TECHNIQUE:

The procedure was performed under the direct supervision of Dr. Loo.



The patient has a history of an evolving pleural base mass like area of consolidation

in the right lower lobe with adjacent loculated complex thick-walled pleural fluid

collection seen on a previous CT scan dated 01/21/2021.



The risks and benefits of the procedure were explained to the patient and informed

consent was obtained.



The right lower lobe lung mass was localized using CT guidance.  The skin was prepped

and draped in a sterile fashion.  1% lidocaine was used as a local anesthetic.  Using

CT guidance a 19/20 gauge coaxial needle biopsy system was inserted and advanced into

the mass.  Five core biopsy samples were obtained.  15 cc of thick red colored fluid

was also withdrawn.  All samples were sent to the lab for analysis.



The patient tolerated the procedure well and there were no immediate complications.

After the appropriate amount to monitor convalescence the patient was discharged from

the department.



FINDINGS:

None



IMPRESSION:

Technically successful CT-guided right lower lobe lung biopsy.



<Electronically signed by Roberto Kent > 02/04/21 1602

<Electronically signed by Phill Loo > 02/04/21 [id number]

## 2021-02-04 NOTE — REP
INDICATION:

POST RIGHT THORA, 2 VIEW.



COMPARISON:

Comparison chest x-ray October 16, 2020..



TECHNIQUE:

PA and lateral views of the chest.



FINDINGS:

There is slight blunting of the right lateral pleural angle and there is blunting and

increased density in the posterior aspect of the right chest on lateral radiograph.

This was the target of today's CT guided biopsy.  There is no evidence of

pneumothorax.  There is a healing fracture of the right posterolateral 6th rib.  Heart

is not enlarged.  Left lung is unchanged and clear.



IMPRESSION:

There is no evidence of pneumothorax or other complication.





<Electronically signed by Phill Loo > 02/04/21 [id number]

## 2021-02-08 ENCOUNTER — HOSPITAL ENCOUNTER (OUTPATIENT)
Dept: HOSPITAL 53 - M RAD | Age: 66
End: 2021-02-08
Attending: NURSE PRACTITIONER
Payer: MEDICARE

## 2021-02-08 DIAGNOSIS — R91.8: Primary | ICD-10-CM

## 2021-02-08 DIAGNOSIS — J43.1: ICD-10-CM

## 2021-02-08 NOTE — REP
INDICATION:

PANLOBULAR EMPHYSEMA



COMPARISON:

10/16/2020



TECHNIQUE:

PA and lateral.



FINDINGS:

Right hilar and right lower lobe opacity noted.  Underlying COPD/emphysematous changes

are appreciated.  The cardiac silhouette is normal.  No effusion.  No pneumothorax.

Skeletal structures intact.



IMPRESSION:

Right perihilar/right lower lobe opacity suggesting consolidation and/or mass.





<Electronically signed by Dinh Spann > 02/08/21 [id number]

## 2021-02-10 ENCOUNTER — HOSPITAL ENCOUNTER (INPATIENT)
Dept: HOSPITAL 53 - M ED | Age: 66
LOS: 5 days | Discharge: HOME | DRG: 178 | End: 2021-02-15
Attending: INTERNAL MEDICINE | Admitting: INTERNAL MEDICINE
Payer: MEDICARE

## 2021-02-10 VITALS — DIASTOLIC BLOOD PRESSURE: 79 MMHG | SYSTOLIC BLOOD PRESSURE: 136 MMHG

## 2021-02-10 VITALS — WEIGHT: 143.74 LBS | BODY MASS INDEX: 20.58 KG/M2 | HEIGHT: 70 IN

## 2021-02-10 DIAGNOSIS — Z87.442: ICD-10-CM

## 2021-02-10 DIAGNOSIS — Z86.711: ICD-10-CM

## 2021-02-10 DIAGNOSIS — I50.9: ICD-10-CM

## 2021-02-10 DIAGNOSIS — Z90.2: ICD-10-CM

## 2021-02-10 DIAGNOSIS — J85.1: Primary | ICD-10-CM

## 2021-02-10 DIAGNOSIS — R91.8: ICD-10-CM

## 2021-02-10 DIAGNOSIS — E46: ICD-10-CM

## 2021-02-10 DIAGNOSIS — Z98.49: ICD-10-CM

## 2021-02-10 DIAGNOSIS — T36.0X5A: ICD-10-CM

## 2021-02-10 DIAGNOSIS — K52.1: ICD-10-CM

## 2021-02-10 DIAGNOSIS — F17.200: ICD-10-CM

## 2021-02-10 DIAGNOSIS — J43.9: ICD-10-CM

## 2021-02-10 DIAGNOSIS — Z90.49: ICD-10-CM

## 2021-02-10 DIAGNOSIS — J96.11: ICD-10-CM

## 2021-02-10 DIAGNOSIS — B95.5: ICD-10-CM

## 2021-02-10 LAB
ALBUMIN SERPL BCG-MCNC: 2.5 GM/DL (ref 3.2–5.2)
ALT SERPL W P-5'-P-CCNC: 43 U/L (ref 12–78)
BASOPHILS # BLD AUTO: 0 10^3/UL (ref 0–0.2)
BASOPHILS NFR BLD AUTO: 0.4 % (ref 0–1)
BILIRUB CONJ SERPL-MCNC: 0.1 MG/DL (ref 0–0.2)
BILIRUB SERPL-MCNC: 0.4 MG/DL (ref 0.2–1)
BUN SERPL-MCNC: 30 MG/DL (ref 7–18)
CALCIUM SERPL-MCNC: 8.6 MG/DL (ref 8.8–10.2)
CHLORIDE SERPL-SCNC: 106 MEQ/L (ref 98–107)
CK MB CFR.DF SERPL CALC: 2.26
CK MB SERPL-MCNC: 1.2 NG/ML (ref ?–3.6)
CK SERPL-CCNC: 53 U/L (ref 39–308)
CO2 SERPL-SCNC: 21 MEQ/L (ref 21–32)
CREAT SERPL-MCNC: 0.96 MG/DL (ref 0.7–1.3)
EOSINOPHIL # BLD AUTO: 0.1 10^3/UL (ref 0–0.5)
EOSINOPHIL NFR BLD AUTO: 1.4 % (ref 0–3)
GFR SERPL CREATININE-BSD FRML MDRD: > 60 ML/MIN/{1.73_M2} (ref 49–?)
GLUCOSE SERPL-MCNC: 94 MG/DL (ref 70–100)
HCT VFR BLD AUTO: 39.5 % (ref 42–52)
HGB BLD-MCNC: 13.5 G/DL (ref 13.5–17.5)
INR PPP: 1.03
LYMPHOCYTES # BLD AUTO: 0.9 10^3/UL (ref 1.5–5)
LYMPHOCYTES NFR BLD AUTO: 11 % (ref 24–44)
MCH RBC QN AUTO: 31.8 PG (ref 27–33)
MCHC RBC AUTO-ENTMCNC: 34.2 G/DL (ref 32–36.5)
MCV RBC AUTO: 92.9 FL (ref 80–96)
MONOCYTES # BLD AUTO: 1.2 10^3/UL (ref 0–0.8)
MONOCYTES NFR BLD AUTO: 14.4 % (ref 0–5)
NEUTROPHILS # BLD AUTO: 5.8 10^3/UL (ref 1.5–8.5)
NEUTROPHILS NFR BLD AUTO: 72.1 % (ref 36–66)
NT-PRO BNP: 223 PG/ML (ref ?–125)
PLATELET # BLD AUTO: 254 10^3/UL (ref 150–450)
POTASSIUM SERPL-SCNC: 4.2 MEQ/L (ref 3.5–5.1)
PROT SERPL-MCNC: 6.2 GM/DL (ref 6.4–8.2)
PROTHROMBIN TIME: 13.7 SECONDS (ref 12.5–14.3)
RBC # BLD AUTO: 4.25 10^6/UL (ref 4.3–6.1)
SODIUM SERPL-SCNC: 136 MEQ/L (ref 136–145)
T4 SERPL-MCNC: 8.5 UG/DL (ref 4.5–12)
TROPONIN I SERPL-MCNC: < 0.02 NG/ML (ref ?–0.1)
TSH SERPL DL<=0.005 MIU/L-ACNC: 0.5 UIU/ML (ref 0.36–3.74)
WBC # BLD AUTO: 8.1 10^3/UL (ref 4–10)

## 2021-02-10 RX ADMIN — DEXTROSE MONOHYDRATE SCH MLS/HR: 5 INJECTION INTRAVENOUS at 23:20

## 2021-02-10 NOTE — CCD
Continuity of Care Document (CCD)

                             Created on: 2020



Adams Barron

External Reference #: [id number]

: 1955

Sex: Male



Demographics





                          Address                   [address] [place]  [address]

 

                          Home Phone                [phone]

 

                          Preferred Language        Unknown

 

                          Marital Status            Unknown

 

                          Mosque Affiliation     Unknown

 

                          Race                      White

 

                          Ethnic Group              Not  or 





Author





                          Author                    Adams ANDRE M.D.

 

                          Organization              Unknown

 

                          Address                   [address]

[place]  [address]



 

                          Phone                     [phone]







Care Team Providers





                    Care Team Member Name Role                Phone

 

                    [facility] - D[facility]            

    [phone]

 

                    [facility] [place] - Urology [facility]                [phone]

 

                    Alan Humphries MD [facility]                [phone]

 

                    [facility]               

 [phone]







Problems





                    Active Problems     Provider            Date

 

                    Benign essential hypertension Natalia Andre M.D. Onset

: 2012







Social History





                Type            Date            Description     Comments

 

                Birth Sex                       Unknown          

 

                Tobacco Use     Start: Unknown End: Unknown Current Cigarette Sm

oker Packs Daily 1  

 

                Tobacco Use     Start: Unknown  Never Smoked Cigars  

 

                Tobacco Use     Start: Unknown  Never Smoked A Pipe  

 

                Tobacco Use     Start: Unknown  Never Used Smokeless Tobacco  

 

                ETOH Use                        Denies alcohol use  

 

                Recreational Drug Use                 Denies Drug Use  

 

                Tobacco Use     Start: Unknown End:  Patient is a former

 smoker  

 

                Smoking Status  Reviewed: 20 Patient is a former smoker  

 

                Exercise Type/Frequency                 Exercises sporadically  

 

                Seat Belt/Car Seat                 Always uses seat belt  







Allergies, Adverse Reactions, Alerts





             Active Allergies Reaction     Severity     Comments     Date

 

             Bactrim                                             2015

 

                                        Inactive Allergies

 

             NKDA                                                2004







Medications





           Active Medications SIG        Qnty       Indications Ordering Provide

r Date

 

                          Spiriva Respimat                     2.5mcg/Act Aeroso

l                   inhale

2 spray(s) by mouth once daily 4units                          Natalia Andre M.D. 2018

 

                          Proair HFA                     108(90Base) mcg/Act Aer

osol                   2 

puffs every 4 hours as needed as needed for cough or sob 8.500gm                

                 Natalia Andre M.D.                          

 

                          Vitamin D-3                     1000Unit Capsules     

              1 by mouth 

every day                                       Unknown         

 

                                        History Medications

 

                          Ceftin                     500mg Tablets              

     1 by mouth twice a 

day x 5 days                                    Unknown         10/19/2020 - 10/



 

                          Doxycycline Hyclate                     100mg Tablets 

                  1 tab by

mouth twice a day for 5 days                                 Unknown         10/

 - 10/24/2020







Immunizations





             CPT Code     Status       Date         Vaccine      Lot #

 

                [id number]           Given           10/18/2020      Influenza Virus 

Vaccine, Quadrivalent,age 3 and 

up,multidose vial                        

 

             62354        Given        2020   Pneumococcal Vaccine [id number]

 

                61149           Given           10/09/2019      Influenza Virus 

Vaccine, Quadrivalent,age 3 and 

up,multidose vial                        

 

             07095        Given        2019   Prevnar 13 For Adults [id number]

 

                86806           Given           2018      Influenza Virus,

 quadravalent, preservative free,age 3 

and up                                  [id number]

 

                93031           Given           2018      Influenza Virus 

Vaccine, Quadrivalent,age 3 and 

up,multidose vial                        

 

             14079        Given        2015   Influenza Vaccination  

 

             84482        Given        2015   Influenza Vaccination [id number]

A

 

             84351        Given        2015   Adacel 11 Yrs or older 

AA







Vital Signs





                Date            Vital           Result          Comment

 

                2020 11:53am BP Systolic     153 mmHg         

 

                    BP Diastolic        85 mmHg              

 

                    BP Systolic Recheck 149 mmHg            recheck

 

                    BP Diastolic Recheck 85 mmHg             recheck

 

                    Heart Rate          88 /min              

 

                    Body Temperature    97.3 F             

 

                    Respiratory Rate    18 /min              

 

                    Height              70 inches           5'10"

 

                    Weight              153.38 lb            

 

                    O2 % BldC Oximetry  98 %                 

 

                    Peak Expiratory Flow Rate 505                 Estimated Peak

 Flow Rate

 

                    Ideal Body Weight   166 lb               

 

                    BMI (Body Mass Index) 22.0 kg/m2           

 

                10/27/2020  9:01am BP Systolic     150 mmHg         

 

                    BP Diastolic        82 mmHg              

 

                    BP Systolic Recheck 124 mmHg             

 

                    BP Diastolic Recheck 81 mmHg              

 

                    Heart Rate          111 /min             

 

                    Body Temperature    96.6 F             

 

                    Respiratory Rate    20 /min              

 

                    Height              70 inches           5'10"

 

                    Weight              150.12 lb            

 

                    O2 % BldC Oximetry  97 %                 

 

                    Peak Expiratory Flow Rate 505                 Estimated Peak

 Flow Rate

 

                    Ideal Body Weight   166 lb               

 

                    BMI (Body Mass Index) 21.5 kg/m2           







Results





        Test    Acquired Date Facility Test    Result  H/L     Range   Note

 

                    Istat Chem8+ Panel  10/16/2020          [facility]

[facility]

[facility] [place] [id number] [phone] iSTAT HCT  65.0 %     High       38.0-51.0   

 

             iSTAT Glucose 154 mg/dL    High                 

 

             iSTAT Sodium 140 mEq/L    Normal       136-145       

 

             iSTAT Potassium 3.4 mEq/L    Low          3.5-5.1       

 

             iSTAT CA++   5.0 mg/dL    Normal       4.5-5.3       

 

             iSTAT Chloride 104 mEq/L    Normal               

 

             iSTAT Co2    19.0 MM/L    Low          23.0-27.0     

 

             iSTAT BUN    24 mg/dL     Normal       8-26          

 

             iSTAT Creatinine 0.6 mg/dL    Normal       0.6-1.3       

 

                    Laboratory test finding 10/16/2020          [facility]

[place] [address]

           [phone] iSTAT Troponin 0.00 NG/ML Normal     0.00-0.08   

 

                    Laboratory test finding 10/16/2020          [facility] 

[place]

[place] [address] [phone] iSTAT Lactate 1.04       Normal     0.4-2.0     

 

                    Venous Blood Gas    10/16/2020          [facility]

[place]

[place] [address] [phone] Venous PH  7.476 units High       7.330-7.430  

 

             Venous Partial Pressure Co2 28.1 mmHg    Low          38.0-50.0    

 

 

             Venous Partial Pressure O2 140.0 mmHg   High         30.0-50.0     

 

             Venous Total Co2 21.1 mEq/L   Low          24.0-28.0     

 

             Venous Hco3  20.3 mEq/L   Low          23.0-27.0     

 

             Venous Base Excess -1.9         Normal       -2.0-2.0      

 

             Venous Standard Hco3 22.9 mEq/L   Normal                     

 

             Venous O2 Saturation 98.9 %       High         60.0-80.0     

 

                    CBC With Differential 10/16/2020          [facility]

[place]ter

[place] [address] [phone] White Blood Count 13.3 10    High       4.0-10.0    

 

             Red Blood Count 4.01 10      Low          4.30-6.10     

 

             Hemoglobin   13.1 g/dL    Low          13.5-17.5     

 

             Hematocrit   37.4 %       Low          42.0-52.0     

 

             Mean Corpuscular Volume 93.3 fl      Normal       80.0-96.0     

 

             Mean Corpuscular Hemoglobin 32.7 pg      Normal       27.0-33.0    

 

 

             Mean Corpuscular HGB Conc 35.0 g/dL    Normal       32.0-36.5     

 

             Red Cell Distribution Width 12.4 %       Normal       11.5-14.5    

 

 

             Platelet Count, Automated 344 10       Normal       150-450       

 

             Neutrophils % 86.7 %       High         36.0-66.0     

 

             Lymph %      2.6 %        Low          24.0-44.0     

 

             Mono %       9.4 %        High         0.0-5.0       

 

             Eos %        0.2 %        Normal       0.0-3.0       

 

             Baso %       0.4 %        Normal       0.0-1.0       

 

             Immature Granulocyte % 0.7 %        Normal       0-3.0         

 

             Nucleated Red Blood Cell % 0.0 %        Normal       0-0           

 

             Neutrophils # 11.5 10      High         1.5-8.5       

 

             Lymph #      0.3 10       Low          1.5-5.0       

 

             Mono #       1.2 10       High         0.0-0.8       

 

             Eos #        0.0 10       Normal       0.0-0.5       

 

             Baso #       0.1 10       Normal       0.0-0.2       

 

                    Prothrombin Time/Inr 10/16/2020          [place] [address] [phone] Prothrombin Time 14.7 seconds High       12.5-14.3   

 

             Inr          1.13         Normal                    1

 

                    Liver Profile       10/16/2020          [place] [address] [phone] Ast/Sgot   24 U/L     Normal     7-37        

 

             Alt/SGPT     35 U/L       Normal       12-78         

 

             Alkaline Phosphatase 105 U/L      Normal               

 

             Bilirubin,Total 1.5 mg/dL    High         0.2-1.0       

 

             Bilirubin,Direct 0.9 mg/dL    High         0.0-0.2       

 

             Total Protein 6.0 GM/DL    Low          6.4-8.2       

 

             Albumin      2.3 GM/DL    Low          3.2-5.2       

 

             Albumin/Globulin Ratio 0.6          Normal                     

 

                    Laboratory test finding 10/16/2020          Patient Service 

[place] [address] [phone] NT-Pro  pg/mL  High       <125        

 

             Thyroid Stimulating Hormone 0.480 uIU/ML Normal       0.358-3.740  

 

 

                    Venous Blood Gas    10/16/2020          [facility]

er

[facility]

[place] [address]

           [phone] Venous PH  7.476 units High       7.330-7.430  

 

             Venous Partial Pressure Co2 28.1 mmHg    Low          38.0-50.0    

 

 

             Venous Partial Pressure O2 140.0 mmHg   High         30.0-50.0     

 

             Venous Total Co2 21.1 mEq/L   Low          24.0-28.0     

 

             Venous Hco3  20.3 mEq/L   Low          23.0-27.0     

 

             Venous Base Excess -1.9         Normal       -2.0-2.0      

 

             Venous Standard Hco3 22.9 mEq/L   Normal                     

 

             Venous O2 Saturation 98.9 %       High         60.0-80.0     

 

                    CBC With Differential 2020          [facility]

           [phone] White Blood Count 7.0 10     Normal     4.0-10.0    

 

             Red Blood Count 4.71 10      Normal       4.30-6.10     

 

             Hemoglobin   16.0 g/dL    Normal       13.5-17.5     

 

             Hematocrit   45.3 %       Normal       42.0-52.0     

 

             Mean Corpuscular Volume 96.2 fl      High         80.0-96.0     

 

             Mean Corpuscular Hemoglobin 34.0 pg      High         27.0-33.0    

 

 

             Mean Corpuscular HGB Conc 35.3 g/dL    Normal       32.0-36.5     

 

             Red Cell Distribution Width 12.7 %       Normal       11.5-14.5    

 

 

             Platelet Count, Automated 219 10       Normal       150-450       

 

             Neutrophils % 68.0 %       High         36.0-66.0     

 

             Lymph %      18.1 %       Low          24.0-44.0     

 

             Mono %       11.1 %       High         0.0-5.0       

 

             Eos %        1.6 %        Normal       0.0-3.0       

 

             Baso %       0.9 %        Normal       0.0-1.0       

 

             Immature Granulocyte % 0.3 %        Normal       0-3.0         

 

             Nucleated Red Blood Cell % 0.0 %        Normal       0-0           

 

             Neutrophils # 4.8 10       Normal       1.5-8.5       

 

             Lymph #      1.3 10       Low          1.5-5.0       

 

             Mono #       0.8 10       Normal       0.0-0.8       

 

             Eos #        0.1 10       Normal       0.0-0.5       

 

             Baso #       0.1 10       Normal       0.0-0.2       

 

                    Comprehensive Metabolic Profil 2020          [facility]

           [phone] Glucose, Fasting 101 mg/dL  High             

 

             Blood Urea Nitrogen 19 mg/dL     High         7-18          

 

             Creatinine For GFR 1.04 mg/dL   Normal       0.70-1.30     

 

             Glomerular Filtration Rate > 60.0       Normal       >49          2

 

             Sodium Level 142 mEq/L    Normal       136-145       

 

             Potassium Serum 4.1 mEq/L    Normal       3.5-5.1       

 

             Chloride Level 112 mEq/L    High                 

 

             Carbon Dioxide Level 24 mEq/L     Normal       21-32         

 

             Anion Gap    6 mEq/L      Low          8-16          

 

             Calcium Level 8.8 mg/dL    Normal       8.8-10.2      

 

             Ast/Sgot     12 U/L       Normal       7-37          

 

             Alt/SGPT     20 U/L       Normal       12-78         

 

             Alkaline Phosphatase 72 U/L       Normal               

 

             Bilirubin,Total 0.8 mg/dL    Normal       0.2-1.0       

 

             Total Protein 6.9 GM/DL    Normal       6.4-8.2       

 

             Albumin      3.7 GM/DL    Normal       3.2-5.2       

 

             Albumin/Globulin Ratio 1.2          Normal                     

 

                    Lipid Panel         2020          [facility]

nter

           [phone] Triglycerides Level 73 mg/dL   Normal     <150        

 

             Cholesterol Level 180 mg/dL    Normal       <200          

 

             HDL Cholesterol 44 mg/dL     Normal       >40           

 

             LDL Cholesterol 121 mg/dL    High         <100          

 

             Non-HDL-C    136 mg/dL    Normal                     

 

             Cholesterol Risk Ratio 4.090        Normal       <5            







                          1                         THERAPUTIC HUMAN INR VALUES

INDICATIONS                      NORMAL RANGES

PROPHYLAXIS/TREATMENT OF:

VENOUS THROMBOSIS                2.0-3.0

PULMONARY EMBOLISM               2.0-3.0

PREVENTION OF SYSTEMIC EMBOLISM FROM:

TISSUE HEART VALVES              2.0-3.0

ACUTE MYOCARDIAL INFARCTION      2.0-3.0

VALVULAR HEART DISEASE           2.0-3.0

ATRIAL FIBRILLATION              2.0-3.0

MECHANICAL VALVES(HIGH RISK)     2.5-3.5

RECURRENT MYOCARDIAL INFARCTION  2.5-3.5



 

                          2                         Units are mL/min/1.73 m2



Chronic Kidney Disease Staging per NKF:



Stage I & II   GFR >=60       Normal to Mildly Decreased

Stage III      GFR 30-59      Moderately Decreased

Stage IV       GFR 15-29      Severely Decreased

Stage V        GFR <15        Very Little GFR Left

ESRD           GFR <15 on RRT









Procedures





                                        Description

 

                                        No Information Available







Medical Devices





                                        Description

 

                                        No Information Available







Encounters





           Type       Date       Location   Provider   Dx         Diagnosis

 

           Office Visit 10/27/2020  9:00a Main Office Natalia Andre M.D. J

44.9      

Chronic obstructive pulmonary disease, unspecified

 

                          J18.9                     Pneumonia, unspecified organ

ism







Assessments





                Date            Code            Description     Provider

 

                2020      R03.0           Elevated blood-pressure reading,

 without diagnosis of hypert 

Natalia Andre M.D.

 

                2020      J44.9           Chronic obstructive pulmonary di

sease, unspecified Natalia Andre M.D.

 

                10/27/2020      J44.9           Chronic obstructive pulmonary di

sease, unspecified Natalia Andre M.D.

 

                10/27/2020      J18.9           Pneumonia, unspecified organism 

Natalia Andre M.D.







Plan of Treatment

2020 - Natalia Andre M.D.* R03.0 Elevated blood-pressure reading, 
  without diagnosis of hypert

* J44.9 Chronic obstructive pulmonary disease, unspecified* Follow up:* 3-4 
  months









Functional Status





                Functional Condition Comment         Date            Status

 

                Bifocal glasses                                 Active

 

                Independent with all ADL's                                 Activ

e

 

                Independent with all IADL's                                 Acti

ve

 

                Hearing Aid in both ears                                 Active







Mental Status





                Mental Condition Comment         Date            Status

 

                None                                            Active







Referrals





                                        Description

 

                                        No Information Available

## 2021-02-10 NOTE — ECGEPIP
[facility] - ED

                                       

                                       Test Date:    2021-02-10

Pat Name:     MONSERRAT HARKINS          Department:   

Patient ID:   [id number]                 Room:         -

Gender:       Male                     Technician:   [email or web address]

:          1955               Requested By: CLAUDIA CROUCH

Order Number: [id number]     Reading MD:   Andrés Deleon

                                 Measurements

Intervals                              Axis          

Rate:         98                       P:            85

MS:           166                      QRS:          56

QRSD:         95                       T:            63

QT:           310                                    

QTc:          396                                    

                           Interpretive Statements

SINUS RHYTHM WITH OCCASIONAL SUPRAVENTRICULAR PREMATURE COMPLEXES

POOR R WAVE PROGRESSION

SIMILAR TO 10/16/20

Electronically Signed on 2- 19:58:31 EST by Andrés Deleon

## 2021-02-10 NOTE — CCD
Continuity of Care Document (CCD)

                             Created on: 2021



Adams Barron

External Reference #: [id number]

: 1955

Sex: Male



Demographics





                          Address                   [address] [place] [address]

 

                          Home Phone                [phone]

 

                          Preferred Language        Unknown

 

                          Marital Status            Unknown

 

                          Faith Affiliation     Unknown

 

                          Race                      White

 

                          Ethnic Group              Not  or 





Author





                          Organization              Unknown

 

                          Address                   Unknown

 

                          Phone                     Unavailable







Care Team Providers





                    Care Team Member Name Role                Phone

 

                    [facility] - Dermatology [facility]            

    [phone]

 

                    [facility] - Urology [facility]                [phone]

 

                    Alan Humphries MD [facility]                [phone]

 

                    [facility] - Pulmonary Disease [facility]               

 [phone]







Problems





                    Active Problems     Provider            Date

 

                    Benign essential hypertension Natalia Andre M.D. Onset

: 2012







Social History





                Type            Date            Description     Comments

 

                Birth Sex                       Unknown          

 

                Tobacco Use     Start: Unknown End: Unknown Current Cigarette Sm

oker Packs Daily 1  

 

                Tobacco Use     Start: Unknown  Never Smoked Cigars  

 

                Tobacco Use     Start: Unknown  Never Smoked A Pipe  

 

                Tobacco Use     Start: Unknown  Never Used Smokeless Tobacco  

 

                ETOH Use                        Denies alcohol use  

 

                Recreational Drug Use                 Denies Drug Use  

 

                Tobacco Use     Start: Unknown End:  Patient is a former

 smoker  

 

                Smoking Status  Reviewed: 20 Patient is a former smoker  

 

                Exercise Type/Frequency                 Exercises sporadically  

 

                Seat Belt/Car Seat                 Always uses seat belt  







Allergies, Adverse Reactions, Alerts





             Active Allergies Reaction     Severity     Comments     Date

 

             Bactrim                                             2015

 

                                        Inactive Allergies

 

             NKDA                                                2004







Medications





           Active Medications SIG        Qnty       Indications Ordering Provide

r Date

 

                          Spiriva Respimat                     2.5mcg/Act Aeroso

l                   inhale

2 spray(s) by mouth once daily 4units                          Natalia Andre M.D. 2018

 

                          Proair HFA                     108(90Base) mcg/Act Aer

osol                   2 

puffs every 4 hours as needed as needed for cough or sob 8.500gm                

                 Natalia Andre M.D.                          

 

                          Vitamin D-3                     1000Unit Capsules     

              1 by mouth 

every day                                       Unknown         

 

                                        History Medications

 

                          Ceftin                     500mg Tablets              

     1 by mouth twice a 

day x 5 days                                    Unknown         10/19/2020 - 10/



 

                          Doxycycline Hyclate                     100mg Tablets 

                  1 tab by

mouth twice a day for 5 days                                 Unknown         10/

 - 10/24/2020







Immunizations





             CPT Code     Status       Date         Vaccine      Lot #

 

                [id number]           Given           10/18/2020      Influenza Virus 

Vaccine, Quadrivalent,age 3 and 

up,multidose vial                        

 

             08739        Given        2020   Pneumococcal Vaccine [id number]

 

                26818           Given           10/09/2019      Influenza Virus 

Vaccine, Quadrivalent,age 3 and 

up,multidose vial                        

 

             04005        Given        2019   Prevnar 13 For Adults [id number]

 

                90743           Given           2018      Influenza Virus,

 quadravalent, preservative free,age 3 

and up                                  [id number]

 

                87126           Given           2018      Influenza Virus 

Vaccine, Quadrivalent,age 3 and 

up,multidose vial                        

 

             35267        Given        2015   Influenza Vaccination  

 

             68528        Given        2015   Influenza Vaccination [id number]

A

 

             30035        Given        2015   Adacel 11 Yrs or older 

AA







Vital Signs





                Date            Vital           Result          Comment

 

                2020 11:53am BP Systolic     153 mmHg         

 

                    BP Diastolic        85 mmHg              

 

                    BP Systolic Recheck 149 mmHg            recheck

 

                    BP Diastolic Recheck 85 mmHg             recheck

 

                    Heart Rate          88 /min              

 

                    Body Temperature    97.3 F             

 

                    Respiratory Rate    18 /min              

 

                    Height              70 inches           5'10"

 

                    Weight              153.38 lb            

 

                    O2 % BldC Oximetry  98 %                 

 

                    Peak Expiratory Flow Rate 505                 Estimated Peak

 Flow Rate

 

                    Ideal Body Weight   166 lb               

 

                    BMI (Body Mass Index) 22.0 kg/m2           

 

                10/27/2020  9:01am BP Systolic     150 mmHg         

 

                    BP Diastolic        82 mmHg              

 

                    BP Systolic Recheck 124 mmHg             

 

                    BP Diastolic Recheck 81 mmHg              

 

                    Heart Rate          111 /min             

 

                    Body Temperature    96.6 F             

 

                    Respiratory Rate    20 /min              

 

                    Height              70 inches           5'10"

 

                    Weight              150.12 lb            

 

                    O2 % BldC Oximetry  97 %                 

 

                    Peak Expiratory Flow Rate 505                 Estimated Peak

 Flow Rate

 

                    Ideal Body Weight   166 lb               

 

                    BMI (Body Mass Index) 21.5 kg/m2           







Results





        Test    Acquired Date Facility Test    Result  H/L     Range   Note

 

                    Cell Count Pleural Fluid 2021          Patient Service

 Center

[place] [address]

           [phone] Source, Body Fluid PLEURAL    Normal                 

 

             Pleural FL Color RED          Normal       Colorless     

 

             Appearance, Body Fluid CLOTTED      Normal       Clear         

 

             WBC Body Fluid TNP /uL      Normal       0-10         1

 

             RBC Body Fluid TNP 10       Normal       <2           2

 

                    TP Body Fluid       2021          [place] [address]

           [phone] Total Protein, Body Fluid 5.2 g/dL   Normal     Not Es

tablished  

 

             Source, Body Fluid Tot Protein PLEURAL      Normal                 

    

 

                    LDH Body Fluid      2021          Patient Service [place] [id number] [phone] LDH, Body Fluid 82483 U/L  Normal     Not Established 

 

 

             Source, Body Fluid LDH PLEURAL      Normal                     

 

                    Glucose Body Fluid  2021          [place] [address]

           [phone] Glucose, Body Fluid 18 mg/dL   Normal     Not Establis

hed  

 

             Source, Body Fluid Glucose PLEURAL      Normal                     

 

                    Amylase Body Fluid  2021          [place] [address]

           [phone] Amylase, Body Fluid 42 U/L     Normal     Not Establis

hed  

 

             Source, Body Fluid Amylase PLEURAL      Normal                     

 

                    PH, Body Fluid      2021          Patient Service [place] [id number] [phone] PH Body Fluid TNP units  Normal     Not Established 3

 

             Source, Body Fluid pH PLEURAL      Normal                     

 

                    Body Fluid Culture And GS 2021          Patient [place]

[place] [address] [phone] Gram Stain (SEE NOTE)  Normal                4

 

             Body Fluid Culture FULL REPORT IN L <SEE NOTE>  Normal             

       5

 

                    Laboratory test finding 2021          Patient Service 

[place] [address] [phone] Anaerobic Culture **************** <SEE NOTE>         

               6

 

                    CBC With Differential 2021          Patient Service Ce

nter

[place] [address] [phone] White Blood Count 10.2 10    High       4.0-10.0   7

 

             Red Blood Count 4.78 10      Normal       4.30-6.10     

 

             Hemoglobin   15.4 g/dL    Normal       13.5-17.5     

 

             Hematocrit   46.4 %       Normal       42.0-52.0     

 

             Mean Corpuscular Volume 97.1 fl      High         80.0-96.0     

 

             Mean Corpuscular Hemoglobin 32.2 pg      Normal       27.0-33.0    

 

 

             Mean Corpuscular HGB Conc 33.2 g/dL    Normal       32.0-36.5     

 

             Red Cell Distribution Width 13.0 %       Normal       11.5-14.5    

 

 

             Platelet Count, Automated 237 10       Normal       150-450       

 

             Neutrophils % 75.3 %       High         36.0-66.0     

 

             Lymph %      10.6 %       Low          24.0-44.0     

 

             Mono %       12.9 %       High         0.0-5.0       

 

             Eos %        0.3 %        Normal       0.0-3.0       

 

             Baso %       0.6 %        Normal       0.0-1.0       

 

             Immature Granulocyte % 0.3 %        Normal       0-3.0         

 

             Nucleated Red Blood Cell % 0.0 %        Normal       0-0           

 

             Neutrophils # 7.6 10       Normal       1.5-8.5       

 

             Lymph #      1.1 10       Low          1.5-5.0       

 

             Mono #       1.3 10       High         0.0-0.8       

 

             Eos #        0.0 10       Normal       0.0-0.5       

 

             Baso #       0.1 10       Normal       0.0-0.2       

 

                    Comprehensive Metabolic Profil 2021          Patient S

[place] [id number] [phone] Glucose, Fasting 96 mg/dL   Normal           

 

             Blood Urea Nitrogen 21 mg/dL     High         7-18          

 

             Creatinine For GFR 0.95 mg/dL   Normal       0.70-1.30     

 

             Glomerular Filtration Rate > 60.0       Normal       >49          8

 

             Sodium Level 142 mEq/L    Normal       136-145       

 

             Potassium Serum 4.0 mEq/L    Normal       3.5-5.1       

 

             Chloride Level 110 mEq/L    High                 

 

             Carbon Dioxide Level 24 mEq/L     Normal       21-32         

 

             Anion Gap    8 mEq/L      Normal       8-16          

 

             Calcium Level 10.0 mg/dL   Normal       8.8-10.2      

 

             Ast/Sgot     7 U/L        Normal       7-37          

 

             Alt/SGPT     19 U/L       Normal       12-78         

 

             Alkaline Phosphatase 78 U/L       Normal               

 

             Bilirubin,Total 0.8 mg/dL    Normal       0.2-1.0       

 

             Total Protein 7.6 GM/DL    Normal       6.4-8.2       

 

             Albumin      3.5 GM/DL    Normal       3.2-5.2       

 

             Albumin/Globulin Ratio 0.9          Normal                     

 

                    Laboratory test finding 2021          Patient Service 

Center

[place] [address] [phone] LDH Lactate Dehydrogenase 120 U/L    Normal     

     9

 

                    Laboratory test finding 2021          Patient Service 

[place] [address] [phone] LDH Lactate Dehydrogenase 107 U/L    Normal     

     10

 

                    Comprehensive Metabolic Profil 2021          Patient S

[place] [address] [phone] Glucose, Fasting 103 mg/dL  High             

 

             Blood Urea Nitrogen 17 mg/dL     Normal       7-18          

 

             Creatinine For GFR 0.85 mg/dL   Normal       0.70-1.30     

 

             Glomerular Filtration Rate > 60.0       Normal       >49          1

1

 

             Sodium Level 139 mEq/L    Normal       136-145       

 

             Potassium Serum 4.1 mEq/L    Normal       3.5-5.1       

 

             Chloride Level 109 mEq/L    High                 

 

             Carbon Dioxide Level 23 mEq/L     Normal       21-32         

 

             Anion Gap    7 mEq/L      Low          8-16          

 

             Calcium Level 9.7 mg/dL    Normal       8.8-10.2      

 

             Ast/Sgot     11 U/L       Normal       7-37          

 

             Alt/SGPT     31 U/L       Normal       12-78         

 

             Alkaline Phosphatase 78 U/L       Normal               

 

             Bilirubin,Total 0.6 mg/dL    Normal       0.2-1.0       

 

             Total Protein 7.3 GM/DL    Normal       6.4-8.2       

 

             Albumin      3.1 GM/DL    Low          3.2-5.2       

 

             Albumin/Globulin Ratio 0.7          Normal                     

 

                    CBC With Differential 2021          [place] [address] [phone] White Blood Count 10.2 10    High       4.0-10.0    

 

             Red Blood Count 4.60 10      Normal       4.30-6.10     

 

             Hemoglobin   14.9 g/dL    Normal       13.5-17.5     

 

             Hematocrit   43.3 %       Normal       42.0-52.0     

 

             Mean Corpuscular Volume 94.1 fl      Normal       80.0-96.0     

 

             Mean Corpuscular Hemoglobin 32.4 pg      Normal       27.0-33.0    

 

 

             Mean Corpuscular HGB Conc 34.4 g/dL    Normal       32.0-36.5     

 

             Red Cell Distribution Width 12.7 %       Normal       11.5-14.5    

 

 

             Platelet Count, Automated 285 10       Normal       150-450       

 

             Neutrophils % 80.3 %       High         36.0-66.0     

 

             Lymph %      8.1 %        Low          24.0-44.0     

 

             Mono %       9.7 %        High         0.0-5.0       

 

             Eos %        0.5 %        Normal       0.0-3.0       

 

             Baso %       0.7 %        Normal       0.0-1.0       

 

             Immature Granulocyte % 0.7 %        Normal       0-3.0         

 

             Nucleated Red Blood Cell % 0.0 %        Normal       0-0           

 

             Neutrophils # 8.2 10       Normal       1.5-8.5       

 

             Lymph #      0.8 10       Low          1.5-5.0       

 

             Mono #       1.0 10       High         0.0-0.8       

 

             Eos #        0.1 10       Normal       0.0-0.5       

 

             Baso #       0.1 10       Normal       0.0-0.2       

 

                    Istat Chem8+ Panel  10/16/2020          [place] [address] [phone] iSTAT HCT  65.0 %     High       38.0-51.0   

 

             iSTAT Glucose 154 mg/dL    High                 

 

             iSTAT Sodium 140 mEq/L    Normal       136-145       

 

             iSTAT Potassium 3.4 mEq/L    Low          3.5-5.1       

 

             iSTAT CA++   5.0 mg/dL    Normal       4.5-5.3       

 

             iSTAT Chloride 104 mEq/L    Normal               

 

             iSTAT Co2    19.0 MM/L    Low          23.0-27.0     

 

             iSTAT BUN    24 mg/dL     Normal       8-26          

 

             iSTAT Creatinine 0.6 mg/dL    Normal       0.6-1.3       

 

                    Laboratory test finding 10/16/2020          Patient Service 

[place] [address] [phone] iSTAT Troponin 0.00 NG/ML Normal     0.00-0.08   

 

                    Laboratory test finding 10/16/2020          Patient Service 

[place] [address] [phone] iSTAT Lactate 1.04       Normal     0.4-2.0     

 

                    Venous Blood Gas    10/16/2020          [place] [address] [phone] Venous PH  7.476 units High       7.330-7.430  

 

             Venous Partial Pressure Co2 28.1 mmHg    Low          38.0-50.0    

 

 

             Venous Partial Pressure O2 140.0 mmHg   High         30.0-50.0     

 

             Venous Total Co2 21.1 mEq/L   Low          24.0-28.0     

 

             Venous Hco3  20.3 mEq/L   Low          23.0-27.0     

 

             Venous Base Excess -1.9         Normal       -2.0-2.0      

 

             Venous Standard Hco3 22.9 mEq/L   Normal                     

 

             Venous O2 Saturation 98.9 %       High         60.0-80.0     

 

                    CBC With Differential 10/16/2020          Patient Service Ce

nter

[facility] [address] [phone] White Blood Count 13.3 10    High       4.0-10.0    

 

             Red Blood Count 4.01 10      Low          4.30-6.10     

 

             Hemoglobin   13.1 g/dL    Low          13.5-17.5     

 

             Hematocrit   37.4 %       Low          42.0-52.0     

 

             Mean Corpuscular Volume 93.3 fl      Normal       80.0-96.0     

 

             Mean Corpuscular Hemoglobin 32.7 pg      Normal       27.0-33.0    

 

 

             Mean Corpuscular HGB Conc 35.0 g/dL    Normal       32.0-36.5     

 

             Red Cell Distribution Width 12.4 %       Normal       11.5-14.5    

 

 

             Platelet Count, Automated 344 10       Normal       150-450       

 

             Neutrophils % 86.7 %       High         36.0-66.0     

 

             Lymph %      2.6 %        Low          24.0-44.0     

 

             Mono %       9.4 %        High         0.0-5.0       

 

             Eos %        0.2 %        Normal       0.0-3.0       

 

             Baso %       0.4 %        Normal       0.0-1.0       

 

             Immature Granulocyte % 0.7 %        Normal       0-3.0         

 

             Nucleated Red Blood Cell % 0.0 %        Normal       0-0           

 

             Neutrophils # 11.5 10      High         1.5-8.5       

 

             Lymph #      0.3 10       Low          1.5-5.0       

 

             Mono #       1.2 10       High         0.0-0.8       

 

             Eos #        0.0 10       Normal       0.0-0.5       

 

             Baso #       0.1 10       Normal       0.0-0.2       

 

                    Prothrombin Time/Inr 10/16/2020          Patient Service [facility]

ter

[place] [address] [phone] Prothrombin Time 14.7 seconds High       12.5-14.3   

 

             Inr          1.13         Normal                    12

 

                    Liver Profile       10/16/2020          Patient Service [place], [place] [id number] [phone] Ast/Sgot   24 U/L     Normal     7-37        

 

             Alt/SGPT     35 U/L       Normal       12-78         

 

             Alkaline Phosphatase 105 U/L      Normal               

 

             Bilirubin,Total 1.5 mg/dL    High         0.2-1.0       

 

             Bilirubin,Direct 0.9 mg/dL    High         0.0-0.2       

 

             Total Protein 6.0 GM/DL    Low          6.4-8.2       

 

             Albumin      2.3 GM/DL    Low          3.2-5.2       

 

             Albumin/Globulin Ratio 0.6          Normal                     

 

                    Laboratory test finding 10/16/2020          Patient Service 

[place], [place] [id number] [phone] NT-Pro  pg/mL  High       <125        

 

             Thyroid Stimulating Hormone 0.480 uIU/ML Normal       0.358-3.740  

 

 

                    Venous Blood Gas    10/16/2020          Patient Service [place], [place] [id number] [phone] Venous PH  7.476 units High       7.330-7.430  

 

             Venous Partial Pressure Co2 28.1 mmHg    Low          38.0-50.0    

 

 

             Venous Partial Pressure O2 140.0 mmHg   High         30.0-50.0     

 

             Venous Total Co2 21.1 mEq/L   Low          24.0-28.0     

 

             Venous Hco3  20.3 mEq/L   Low          23.0-27.0     

 

             Venous Base Excess -1.9         Normal       -2.0-2.0      

 

             Venous Standard Hco3 22.9 mEq/L   Normal                     

 

             Venous O2 Saturation 98.9 %       High         60.0-80.0     







                          1                         CLOTTED, UNABLE TO PERFORM T

ESTING



 

                          2                         CLOTTED, UNABLE TO PERFORM T

ESTING



 

                          3                         CLOTTED, UNABLE TO PERFORM T

ESTING



 

                          4                         MANY WBCS

MANY RBCS

FEW GRAM POSITIVE COCCI IN PAIRS AND CHAINS





 

                          5                         FULL REPORT IN LAB NOTES ([facility]

W and [facility]).



ORGANISM 1: STREP CONSTELLATUS SPP PHARYNG



QUANTITY OF GROWTH            HEAVY





ORGANISM 1: STREP CONSTELLATUS SPP PHARYNG



STREP CONSTELLATUS SPP PHARYNG:                    REACTION

TETRACYCLINE                       PO         250 mg qid 0.5      S

PENICILLIN G                       IV         1 mu  q6H 0.5      I

PENICILLIN G                       IV         1 mu  q6h 0.5      I

PENICILLIN G                       PO         250mg q6h fasting 0.5      I

AMPICILLIN                         IV         500mg q6h <=0.25      S

AMPICILLIN                         PO         500mg q6h fasting <=0.25      S

ERYTHROMYCIN                       IV         500mg q6h <=0.12      S

ERYTHROMYCIN                       PO         500mg q6h <=0.12      S

CLINDAMYCIN                        IV         600mg q6h 0.5      I

CLINDAMYCIN                        PO         150mg q6h 0.5      I

LEVOFLOXACIN                       IV         500mg qd 0.5      S

LEVOFLOXACIN                       PO         250mg qd 0.5      S

LEVOFLOXACIN                       PO         500mg qd 0.5      S

VANCOMYCIN                         IV         500mg q8h 0.5      S

MOXIFLOXACIN (AVELOX)              IV         400MG QD 0.12      S

MOXIFLOXACIN (AVELOX)              PO         400MG QD 0.12      S

CEFTRIAXONE                        IV         1gm q24h <=0.12      S

CEFOTAXIME                         IV         1gm  q8h <=0.12      S



 

                          6                         ****************************

*********************

If anaerobic or aerobic growth is detected within

the next 7-21 days, an addendum will follow.

                                        .***************************************

********.



FULL REPORT IN LAB NOTES ([facility] and [facility]).

NO GROWTH ANAEROBICALLY





 

                          7                         A Pathologist review of this

 differential can help in the

evaluation of a differential diagnosis.  Please order a

Pathologist Review (PERISM) if deemed necessary.  Results

are subject to change if a Pathologist Review is performed.



 

                          8                         Units are mL/min/1.73 m2



Chronic Kidney Disease Staging per NKF:



Stage I & II   GFR >=60       Normal to Mildly Decreased

Stage III      GFR 30-59      Moderately Decreased

Stage IV       GFR 15-29      Severely Decreased

Stage V        GFR <15        Very Little GFR Left

ESRD           GFR <15 on RRT



 

                          9                         By: PARKER GALE

Time: 1037

By: PARKER GALE

Time: 

By: PARKER GALE Time:  By: PARKER GALE Time: 



 

                          10                        By: FRANCI MOTTA

Time: 854

By: FRANCI MOTTA Time: 854



 

                          11                        Units are mL/min/1.73 m2



Chronic Kidney Disease Staging per NKF:



Stage I & II   GFR >=60       Normal to Mildly Decreased

Stage III      GFR 30-59      Moderately Decreased

Stage IV       GFR 15-29      Severely Decreased

Stage V        GFR <15        Very Little GFR Left

ESRD           GFR <15 on RRT



 

                          12                        THERAPUTIC HUMAN INR VALUES

INDICATIONS                      NORMAL RANGES

PROPHYLAXIS/TREATMENT OF:

VENOUS THROMBOSIS                2.0-3.0

PULMONARY EMBOLISM               2.0-3.0

PREVENTION OF SYSTEMIC EMBOLISM FROM:

TISSUE HEART VALVES              2.0-3.0

ACUTE MYOCARDIAL INFARCTION      2.0-3.0

VALVULAR HEART DISEASE           2.0-3.0

ATRIAL FIBRILLATION              2.0-3.0

MECHANICAL VALVES(HIGH RISK)     2.5-3.5

RECURRENT MYOCARDIAL INFARCTION  2.5-3.5









Procedures





                                        Description

 

                                        No Information Available







Medical Devices





                                        Description

 

                                        No Information Available







Encounters





           Type       Date       Location   Provider   Dx         Diagnosis

 

           Office Visit 2020 11:45a Main Office Natalia Andre M.D. R

03.0      

Elevated blood-pressure reading, w/o diagnosis of htn

 

                          J44.9                     Chronic obstructive pulmonar

y disease, unspecified

 

                          Z13.89                    Encounter for screening for 

other disorder

 

           Office Visit 10/27/2020  9:00a Main Office Natalia Andre M.D. J

44.9      

Chronic obstructive pulmonary disease, unspecified

 

                          J18.9                     Pneumonia, unspecified organ

ism







Assessments





                Date            Code            Description     Provider

 

                2020      R03.0           Elevated blood-pressure reading,

 without diagnosis of hypert 

Natalia Andre M.D.

 

                2020      J44.9           Chronic obstructive pulmonary di

sease, unspecified Natalia Andre M.D.

 

                2020      Z13.89          Encounter for screening for othe

r disorder Natalia Andre M.D.

 

                10/27/2020      J44.9           Chronic obstructive pulmonary di

sease, unspecified Natalia Andre M.D.

 

                10/27/2020      J18.9           Pneumonia, unspecified organism 

Natalia Andre M.D.







Plan of Treatment

Future Appointment(s):* 2021 11:00 am - Natalia Andre M.D. at Main 
  Office

2020 - Natalia Andre M.D.* R03.0 Elevated blood-pressure reading, 
  without diagnosis of hypert

* J44.9 Chronic obstructive pulmonary disease, unspecified* Comments:* I  spoke 
  with patients pulmonologist, Dr. Echeverria.  He is going to make sure an f/u appt
  is arranged.  Right now no changes.



* Follow up:* 3-4 months





* Z13.89 Encounter for screening for other disorder





Goals

2020 - Natalia Andre M.D.* R03.0 Elevated blood-pressure reading, 
  without diagnosis of hypert* Your blood pressure is elevated.  Focus on a 
  reduced calorie, lower sodium diet as well as increased exercise and weight 
  loss to improve blood pressure readings.  







Functional Status





                Functional Condition Comment         Date            Status

 

                Bifocal glasses                                 Active

 

                Independent with all ADL's                                 Activ

e

 

                Independent with all IADL's                                 Acti

ve

 

                Hearing Aid in both ears                                 Active







Mental Status





                Mental Condition Comment         Date            Status

 

                None                                            Active







Referrals





                                        Description

 

                                        No Information Available

## 2021-02-10 NOTE — CCD
Continuity of Care Document (CCD)

                             Created on: 2021



Adams Barron

External Reference #: [id number]

: 1955

Sex: Male



Demographics





                          Address                   [address] [place]  [address]

 

                          Home Phone                [phone]

 

                          Preferred Language        Unknown

 

                          Marital Status            Unknown

 

                          Religion Affiliation     Unknown

 

                          Race                      White

 

                          Ethnic Group              Not  or 





Author





                          Author                    Adams ECHEVERRIA MD

 

                          Organization              Unknown

 

                          Address                   [address] [address]  [address]



 

                          Phone                     [phone]







Care Team Providers





                    Care Team Member Name Role                Phone

 

                    Natalia Andre M.D. AUTM                [phone]

 

                    Radiology/Procedure AUTM                Unavailable







Problems





                    Active Problems     Provider            Date

 

                    Screening for malignant neoplasm of colon KELSEY Bowers Onset: 2017

 

                    Dyspnea             Terese Heredia, A.N.P. Onset: 2018







Social History





                Type            Date            Description     Comments

 

                Birth Sex                       Unknown          

 

                Cigarette Use                   Pack Years -  40  

 

                ETOH Use                        Denies alcohol use  

 

                Recreational Drug Use                 Denies Drug Use  

 

                Tobacco Use     Start: Unknown End: Unknown Patient is a former 

smoker QUIT 2016  hx: [id number] started at age 17 

 

                Smoking Status  Reviewed: 21 Patient is a former smoker QU

IT 2016  

hx: [id number] started at age 17 







Allergies, Adverse Reactions, Alerts





                                        Description

 

                                        No Known Drug Allergies







Medications





           Active Medications SIG        Qnty       Indications Ordering Provide

r Date

 

                          Proair HFA                     108(90Base) mcg/Act Aer

osol                   2 

puffs four times a day as needed 8.500units      R06.02          Terese Heredia, A.

N.P. 

2018

 

                          Spiriva Respimat                     2.5mcg/Act Aeroso

l                   2 

puffs every day 4units                          Bird Echeverria MD 2017

 

                                        Vitamin D (Cholecalciferol)             

        1000Unit Capsules               

             1tab po qd                             Unknown      

 

                                        History Medications

 

                          Cefuroxime Axetil                     500mg Tablets   

                1 by mouth

twice a day     20tabs                          Bird Echeverria MD 2021 -

 2021

 

                          Cefuroxime Axetil                     500mg Tablets   

                1 by mouth

twice a day     20tabs                          Bird Echeverria MD 2020 -

 2021

 

                          Levofloxacin                     500mg Tablets        

           1 by mouth 

every day       10tabs                          Bird Echeverria MD 10/12/2020 -

 2020







Immunizations





             CPT Code     Status       Date         Vaccine      Lot #

 

             [id number]        Given        10/09/2019   Afluria, Quadrivalent, 0.5ml

, NDC# 02237-621-60  







Vital Signs





                Date            Vital           Result          Comment

 

                2021 11:25am BP Systolic     140 mmHg         

 

                    BP Diastolic        80 mmHg              

 

                    Heart Rate          80 /min              

 

                    O2 % BldC Oximetry  97 %                Room Air

 

                    Height              70 inches           5'10"

 

                    Weight              151.00 lb            

 

                    BMI (Body Mass Index) 21.7 kg/m2           

 

                    Ideal Body Weight   166 lb               

 

                    Weight              68.494 kg            

 

                    BSA (Body Surface Area) 1.85 m2              

 

                2020 10:56am BP Systolic     140 mmHg         

 

                    BP Diastolic        90 mmHg              

 

                    Heart Rate          111 /min             

 

                    O2 % BldC Oximetry  96 %                 

 

                    Body Temperature    96.0 F             

 

                    Height              70 inches           5'10"

 

                    Weight              152.00 lb            

 

                    BMI (Body Mass Index) 21.8 kg/m2           

 

                    Ideal Body Weight   166 lb               

 

                    Weight              68.947 kg            

 

                    BSA (Body Surface Area) 1.86 m2              







Results





        Test    Acquired Date Facility Test    Result  H/L     Range   Note

 

                    Coag Panel For Procedures 2021          [facility] Main [facility]

                                        [address], [place] [id number] [phone] Platelet Count, Automated 312 10     Normal     150-45

0     

 

                    Prothrombin Time/Inr 2021          [facility]

enter Main Lab

                                        [address], [place] [id number] [phone] Prothrombin Time 13.1 seconds Normal     12.5-14.3   

 

             Inr          0.97         Normal                    1

 

             Partial Thromboplastin Time 34.6 seconds Normal       24.2-38.5    

 

 

                    CBC With Differential 2021          [facility] Main [facility]

                                        [address], [place] [id number] [phone] White Blood Count 10.2 10    High       4.0-10.0    

 

             Red Blood Count 4.60 10      Normal       4.30-6.10     

 

             Hemoglobin   14.9 g/dL    Normal       13.5-17.5     

 

             Hematocrit   43.3 %       Normal       42.0-52.0     

 

             Mean Corpuscular Volume 94.1 fl      Normal       80.0-96.0     

 

             Mean Corpuscular Hemoglobin 32.4 pg      Normal       27.0-33.0    

 

 

             Mean Corpuscular HGB Conc 34.4 g/dL    Normal       32.0-36.5     

 

             Red Cell Distribution Width 12.7 %       Normal       11.5-14.5    

 

 

             Platelet Count, Automated 285 10       Normal       150-450       

 

             Neutrophils % 80.3 %       High         36.0-66.0     

 

             Lymph %      8.1 %        Low          24.0-44.0     

 

             Mono %       9.7 %        High         0.0-5.0       

 

             Eos %        0.5 %        Normal       0.0-3.0       

 

             Baso %       0.7 %        Normal       0.0-1.0       

 

             Immature Granulocyte % 0.7 %        Normal       0-3.0         

 

             Nucleated Red Blood Cell % 0.0 %        Normal       0-0           

 

             Neutrophils # 8.2 10       Normal       1.5-8.5       

 

             Lymph #      0.8 10       Low          1.5-5.0       

 

             Mono #       1.0 10       High         0.0-0.8       

 

             Eos #        0.1 10       Normal       0.0-0.5       

 

             Baso #       0.1 10       Normal       0.0-0.2       

 

                    Comprehensive Metabolic Profil 2021          [facility] Main [facility]

                                        [address] [place] [address] [phone] Glucose, Fasting 103 mg/dL  High             

 

             Blood Urea Nitrogen 17 mg/dL     Normal       7-18          

 

             Creatinine For GFR 0.85 mg/dL   Normal       0.70-1.30     

 

             Glomerular Filtration Rate > 60.0       Normal       >49          2

 

             Sodium Level 139 mEq/L    Normal       136-145       

 

             Potassium Serum 4.1 mEq/L    Normal       3.5-5.1       

 

             Chloride Level 109 mEq/L    High                 

 

             Carbon Dioxide Level 23 mEq/L     Normal       21-32         

 

             Anion Gap    7 mEq/L      Low          8-16          

 

             Calcium Level 9.7 mg/dL    Normal       8.8-10.2      

 

             Ast/Sgot     11 U/L       Normal       7-37          

 

             Alt/SGPT     31 U/L       Normal       12-78         

 

             Alkaline Phosphatase 78 U/L       Normal               

 

             Bilirubin,Total 0.6 mg/dL    Normal       0.2-1.0       

 

             Total Protein 7.3 GM/DL    Normal       6.4-8.2       

 

             Albumin      3.1 GM/DL    Low          3.2-5.2       

 

             Albumin/Globulin Ratio 0.7          Normal                     

 

                    Laboratory test finding 2021          [facility]

                                        [address] [place] [address] [phone] LDH Lactate Dehydrogenase 107 U/L    Normal     

     3

 

                    Coag Panel For Procedures 2020          [facility]

                                        [address] [place] [address] [phone] Platelet Count, Automated 342 10     Normal     150-45

0     

 

             Partial Thromboplastin Time <pending>                              

 

 

                    Prothrombin Time/Inr 2020          [facility]

enter Main Lab

                                        [address] [place] [address] [phone] Prothrombin Time 12.9 seconds Normal     12.5-14.3   

 

             Inr          0.95         Normal                    4

 

             Partial Thromboplastin Time 32.8 seconds Normal       24.2-38.5    

 







                          1                         THERAPUTIC HUMAN INR VALUES

INDICATIONS                      NORMAL RANGES

PROPHYLAXIS/TREATMENT OF:

VENOUS THROMBOSIS                2.0-3.0

PULMONARY EMBOLISM               2.0-3.0

PREVENTION OF SYSTEMIC EMBOLISM FROM:

TISSUE HEART VALVES              2.0-3.0

ACUTE MYOCARDIAL INFARCTION      2.0-3.0

VALVULAR HEART DISEASE           2.0-3.0

ATRIAL FIBRILLATION              2.0-3.0

MECHANICAL VALVES(HIGH RISK)     2.5-3.5

RECURRENT MYOCARDIAL INFARCTION  2.5-3.5



 

                          2                         Units are mL/min/1.73 m2



Chronic Kidney Disease Staging per NKF:



Stage I & II   GFR >=60       Normal to Mildly Decreased

Stage III      GFR 30-59      Moderately Decreased

Stage IV       GFR 15-29      Severely Decreased

Stage V        GFR <15        Very Little GFR Left

ESRD           GFR <15 on RRT



 

                          3                         By: FRANCI MOTTA

Time: 0854

By: FRANCI MOTTA Time: 0854



 

                          4                         THERAPUTIC HUMAN INR VALUES

INDICATIONS                      NORMAL RANGES

PROPHYLAXIS/TREATMENT OF:

VENOUS THROMBOSIS                2.0-3.0

PULMONARY EMBOLISM               2.0-3.0

PREVENTION OF SYSTEMIC EMBOLISM FROM:

TISSUE HEART VALVES              2.0-3.0

ACUTE MYOCARDIAL INFARCTION      2.0-3.0

VALVULAR HEART DISEASE           2.0-3.0

ATRIAL FIBRILLATION              2.0-3.0

MECHANICAL VALVES(HIGH RISK)     2.5-3.5

RECURRENT MYOCARDIAL INFARCTION  2.5-3.5









Procedures





                Date            Code            Description     Status

 

                2020      80746           Inhaler Teaching Completed

 

                2020      42896           Spirometry      Completed







Medical Devices





                                        Description

 

                                        No Information Available







Encounters





           Type       Date       Location   Provider   Dx         Diagnosis

 

             Office Visit 2021 11:30a [facility] Moses Echeverria MD 

R91.8                                   Other nonspecific abnormal finding of barb

ng field

 

                          J43.1                     Panlobular emphysema

 

                          Z87.891                   Personal history of nicotine

 dependence

 

             Office Visit 2020 11:30a [facility] Moses Echeverria MD 

R91.8                                   Other nonspecific abnormal finding of barb

ng field

 

                          J43.1                     Panlobular emphysema

 

                          Z87.891                   Personal history of nicotine

 dependence

 

                          J90                       Pleural effusion, not elsewh

ere classified

 

             Office Visit 2020  1:45p [facility] Moses Echeverria MD 

R91.8                                   Other nonspecific abnormal finding of barb

ng field

 

                          J43.1                     Panlobular emphysema

 

                          Z87.891                   Personal history of nicotine

 dependence







Assessments





                Date            Code            Description     Provider

 

                2021      R91.8           Other nonspecific abnormal findi

ng of lung field Bird Echeverria MD

 

                2021      J43.1           Panlobular emphysema Bird marquez MD

 

                2021      Z87.891         Personal history of nicotine dep

endence Bird Echeverria MD

 

                2020      R91.8           Other nonspecific abnormal findi

ng of lung field Bird cEheverria MD

 

                2020      J43.1           Panlobular emphysema Bird marquez MD

 

                2020      Z87.891         Personal history of nicotine dep

endence Bird Echeverria MD

 

                2020      J90             Pleural effusion, not elsewhere 

classified Bird Echeverria MD

 

                2020      R91.8           Other nonspecific abnormal findi

ng of lung field Bird Echeverria MD

 

                2020      J43.1           Panlobular emphysema Bird marquez MD

 

                2020      Z87.891         Personal history of nicotine dep

endence Bird Echeverria MD

 

                10/16/2020      J43.1           Panlobular emphysema Bird marquez MD

 

                10/16/2020      R91.8           Other nonspecific abnormal findi

ng of lung field Bird Echeverria MD

 

                10/16/2020      R09.02          Hypoxemia       Bird Echeverria MD







Plan of Treatment

Future Appointment(s):* 02/10/2021  2:00 pm - Bird Echeverria MD at [facility]

* 2021 11:30 am - Bird Echeverria MD at [facility]

* 2021  1:30 pm - Bird Echeverria MD at [facility]

2020 - Bird Echeverria MD* R91.8 Other nonspecific abnormal finding of 
  lung field

* J43.1 Panlobular emphysema

* Z87.891 Personal history of nicotine dependence

* * New Labs:* FVL/Scooby, Scheduled: 21



* Comments:* ~ In view of the above, he is to continue current regimen.  He is 
  up to date on medications.  He is up to date on immunizations.  ~ We should 
  repeat his CT scan in mid-December, as he will be, at least, 8 weeks out with 
  palliated treatment.  I will call the results of it.~ If he is doing well and 
  has no other new issues, I will see him in return in a minimum of 4 months 
  from now with spirometry, oximetry and flow volume loop for his emphysema.  We
  await his return.



* Follow up:* CT mid Dec....will call results 4 months with fvl









Functional Status





                                        Description

 

                                        No Information Available







Mental Status





                                        Description

 

                                        No Information Available







Referrals





                Refer to      Reason for Referral Status          Appt Date

 

                Radiology/Procedure 07463           Created         

 

                                          

 

                Radiology/Procedure 87295           Closed          

 

                                          

 

                Radiology/Procedure 43461           Closed          

 

                                          

 

                Radiology/Procedure no site selection required approved [id number]  C

losed          

 

                                          

 

                          Radiology/Procedure       04864 CHT CHEST NO CONTRAST-

[id number] VALID 20-3/27/21

                          Closed

## 2021-02-10 NOTE — CCD
Continuity of Care Document (CCD)

                             Created on: 2020



Adams Barron

External Reference #: [id number]

: 1955

Sex: Male



Demographics





                          Address                   [address] [place]  [address]

 

                          Home Phone                [phone]

 

                          Preferred Language        Unknown

 

                          Marital Status            Unknown

 

                          Buddhist Affiliation     Unknown

 

                          Race                      White

 

                          Ethnic Group              Not  or 





Author





                          Author                    Adams ECHEVERRIA MD

 

                          Organization              Unknown

 

                          Address                   [address] [address]  [address]



 

                          Phone                     [phone]







Care Team Providers





                    Care Team Member Name Role                Phone

 

                    Natalia Andre M.D. AUTM                [phone]

 

                    Radiology/Procedure AUTM                Unavailable







Problems





                    Active Problems     Provider            Date

 

                    Screening for malignant neoplasm of colon KELSEY Bowers Onset: 2017

 

                    Dyspnea             Terese Heredia, A.N.P. Onset: 2018







Social History





                Type            Date            Description     Comments

 

                Birth Sex                       Unknown          

 

                Cigarette Use                   Pack Years -  40  

 

                ETOH Use                        Denies alcohol use  

 

                Recreational Drug Use                 Denies Drug Use  

 

                Tobacco Use     Start: Unknown End: Unknown Patient is a former 

smoker QUIT 2016  hx: [id number] started at age 17 

 

                Smoking Status  Reviewed: 20 Patient is a former smoker QU

IT 2016  

hx: [id number] started at age 17 







Allergies, Adverse Reactions, Alerts





                                        Description

 

                                        No Known Drug Allergies







Medications





           Active Medications SIG        Qnty       Indications Ordering Provide

r Date

 

                          Cefuroxime Axetil                     500mg Tablets   

                1 by mouth

twice a day     20tabs                          Bird Echeverria MD 2020

 

                          Proair HFA                     108(90Base) mcg/Act Aer

osol                   2 

puffs four times a day as needed 8.500units      R06.02          Terese Heredia, A.

N.P. 

2018

 

                          Spiriva Respimat                     2.5mcg/Act Aeroso

l                   2 

puffs every day 4units                          Bird Echeverria MD 2017

 

                                        Vitamin D (Cholecalciferol)             

        1000Unit Capsules               

             1tab po qd                             Unknown      

 

                                        History Medications

 

                          Levofloxacin                     500mg Tablets        

           1 by mouth 

every day       10tabs                          Bird Echeverria MD 10/12/2020 -

 2020







Immunizations





             CPT Code     Status       Date         Vaccine      Lot #

 

             [id number]        Given        10/09/2019   Afluria, Quadrivalent, 0.5ml

, NDC# 96002-914-23  







Vital Signs





                Date            Vital           Result          Comment

 

                2020 10:56am BP Systolic     140 mmHg         

 

                    BP Diastolic        90 mmHg              

 

                    Heart Rate          111 /min             

 

                    O2 % BldC Oximetry  96 %                 

 

                    Body Temperature    96.0 F             

 

                    Height              70 inches           5'10"

 

                    Weight              152.00 lb            

 

                    BMI (Body Mass Index) 21.8 kg/m2           

 

                    Ideal Body Weight   166 lb               

 

                    Weight              68.947 kg            

 

                    BSA (Body Surface Area) 1.86 m2              

 

                2020  1:50pm BP Systolic     136 mmHg         

 

                    BP Diastolic        80 mmHg              

 

                    Heart Rate          94 /min              

 

                    O2 % BldC Oximetry  94 %                Room Air

 

                    Height              70 inches           5'10"

 

                    Weight              152.00 lb            

 

                    BMI (Body Mass Index) 21.8 kg/m2           

 

                    Ideal Body Weight   166 lb               

 

                    Weight              68.947 kg            

 

                    BSA (Body Surface Area) 1.86 m2              







Results





                                        Description

 

                                        No Information Available







Procedures





                Date            Code            Description     Status

 

                2020      09509           Spirometry      Completed







Medical Devices





                                        Description

 

                                        No Information Available







Encounters





                                        Description

 

                                        No Information Available







Assessments





                Date            Code            Description     Provider

 

                2020      R91.8           Other nonspecific abnormal findi

ng of lung field Bird Echeverria MD

 

                2020      J43.1           Panlobular emphysema Bird marquez MD

 

                2020      Z87.891         Personal history of nicotine dep

endence Bird Echeverria MD

 

                2020      J90             Pleural effusion, not elsewhere 

classified Bird Echeverria MD

 

                2020      R91.8           Other nonspecific abnormal findi

ng of lung field Bird Echeverria MD

 

                2020      J43.1           Panlobular emphysema Bird marquez MD

 

                2020      Z87.891         Personal history of nicotine dep

endence Bird Echeverria MD

 

                10/16/2020      J43.1           Panlobular emphysema Bird marquez MD

 

                10/16/2020      R91.8           Other nonspecific abnormal findi

ng of lung field Bird Echeverria MD

 

                10/16/2020      R09.02          Hypoxemia       Bird Echeverria MD







Plan of Treatment

Future Appointment(s):* 2021  1:30 pm - Bird Echeverria MD at [facility] 
  Pulmonary/Thoracic

2020 - Bird Echeverria MD* R91.8 Other nonspecific abnormal finding of 
  lung field

* J43.1 Panlobular emphysema

* Z87.891 Personal history of nicotine dependence

* J90 Pleural effusion, not elsewhere classified

* * New Labs:* Coag Panel For Procedures, Ordered: 20



* Follow up:* f/u after results available









Functional Status





                                        Description

 

                                        No Information Available







Mental Status





                                        Description

 

                                        No Information Available







Referrals





                Refer to      Reason for Referral Status          Appt Date

 

                Radiology/Procedure no site selection required approved [id number]  C

losed          

 

                                          

 

                          Radiology/Procedure       69260 CHT CHEST NO CONTRAST-

[id number] VALID 20-3/27/21

                          Closed

## 2021-02-10 NOTE — CCD
Continuity of Care Document (CCD)

                             Created on: 2021



Adams Barron

External Reference #: [id number]

: 1955

Sex: Male



Demographics





                          Address                   [address] [place]  [address]

 

                          Home Phone                [phone]

 

                          Preferred Language        Unknown

 

                          Marital Status            Unknown

 

                          Alevism Affiliation     Unknown

 

                          Race                      White

 

                          Ethnic Group              Not  or 





Author





                          Author                    Adams ECHEVERRIA MD

 

                          Organization              Unknown

 

                          Address                   [address] [address]  [address]



 

                          Phone                     [phone]







Care Team Providers





                    Care Team Member Name Role                Phone

 

                    Natalia Andre M.D. AUTM                [phone]

 

                    Radiology/Procedure AUTM                Unavailable







Problems





                    Active Problems     Provider            Date

 

                    Screening for malignant neoplasm of colon KELSEY Bowers Onset: 2017

 

                    Dyspnea             Terese Heredia, A.N.P. Onset: 2018







Social History





                Type            Date            Description     Comments

 

                Birth Sex                       Unknown          

 

                Cigarette Use                   Pack Years -  40  

 

                ETOH Use                        Denies alcohol use  

 

                Recreational Drug Use                 Denies Drug Use  

 

                Tobacco Use     Start: Unknown End: Unknown Patient is a former 

smoker QUIT 2016  hx: [id number] started at age 17 

 

                Smoking Status  Reviewed: 21 Patient is a former smoker QU

IT 2016  

hx: [id number] started at age 17 







Allergies, Adverse Reactions, Alerts





                                        Description

 

                                        No Known Drug Allergies







Medications





           Active Medications SIG        Qnty       Indications Ordering Provide

r Date

 

                          Proair HFA                     108(90Base) mcg/Act Aer

osol                   2 

puffs four times a day as needed 8.500units      R06.02          Terese Heredia, A.

N.P. 

2018

 

                          Spiriva Respimat                     2.5mcg/Act Aeroso

l                   2 

puffs every day 4units                          Bird Echeverria MD 2017

 

                                        Vitamin D (Cholecalciferol)             

        1000Unit Capsules               

             1tab po qd                             Unknown      

 

                                        History Medications

 

                          Cefuroxime Axetil                     500mg Tablets   

                1 by mouth

twice a day     20tabs                          Bird Echeverria MD 2021 -

 2021

 

                          Cefuroxime Axetil                     500mg Tablets   

                1 by mouth

twice a day     20tabs                          Bird Echeverria MD 2020 -

 2021

 

                          Levofloxacin                     500mg Tablets        

           1 by mouth 

every day       10tabs                          Bird Echeverria MD 10/12/2020 -

 2020







Immunizations





             CPT Code     Status       Date         Vaccine      Lot #

 

             [id number]        Given        10/09/2019   Afluria, Quadrivalent, 0.5ml

, NDC# 91485-590-21  







Vital Signs





                Date            Vital           Result          Comment

 

                2021 11:25am BP Systolic     140 mmHg         

 

                    BP Diastolic        80 mmHg              

 

                    Heart Rate          80 /min              

 

                    O2 % BldC Oximetry  97 %                Room Air

 

                    Height              70 inches           5'10"

 

                    Weight              151.00 lb            

 

                    BMI (Body Mass Index) 21.7 kg/m2           

 

                    Ideal Body Weight   166 lb               

 

                    Weight              68.494 kg            

 

                    BSA (Body Surface Area) 1.85 m2              

 

                2020 10:56am BP Systolic     140 mmHg         

 

                    BP Diastolic        90 mmHg              

 

                    Heart Rate          111 /min             

 

                    O2 % BldC Oximetry  96 %                 

 

                    Body Temperature    96.0 F             

 

                    Height              70 inches           5'10"

 

                    Weight              152.00 lb            

 

                    BMI (Body Mass Index) 21.8 kg/m2           

 

                    Ideal Body Weight   166 lb               

 

                    Weight              68.947 kg            

 

                    BSA (Body Surface Area) 1.86 m2              







Results





        Test    Acquired Date Facility Test    Result  H/L     Range   Note

 

                    CBC With Differential 2021          [facility]

                                        [address] [place] [address] [phone] White Blood Count 10.2 10    High       4.0-10.0   1

 

             Red Blood Count 4.78 10      Normal       4.30-6.10     

 

             Hemoglobin   15.4 g/dL    Normal       13.5-17.5     

 

             Hematocrit   46.4 %       Normal       42.0-52.0     

 

             Mean Corpuscular Volume 97.1 fl      High         80.0-96.0     

 

             Mean Corpuscular Hemoglobin 32.2 pg      Normal       27.0-33.0    

 

 

             Mean Corpuscular HGB Conc 33.2 g/dL    Normal       32.0-36.5     

 

             Red Cell Distribution Width 13.0 %       Normal       11.5-14.5    

 

 

             Platelet Count, Automated 237 10       Normal       150-450       

 

             Neutrophils % 75.3 %       High         36.0-66.0     

 

             Lymph %      10.6 %       Low          24.0-44.0     

 

             Mono %       12.9 %       High         0.0-5.0       

 

             Eos %        0.3 %        Normal       0.0-3.0       

 

             Baso %       0.6 %        Normal       0.0-1.0       

 

             Immature Granulocyte % 0.3 %        Normal       0-3.0         

 

             Nucleated Red Blood Cell % 0.0 %        Normal       0-0           

 

             Neutrophils # 7.6 10       Normal       1.5-8.5       

 

             Lymph #      1.1 10       Low          1.5-5.0       

 

             Mono #       1.3 10       High         0.0-0.8       

 

             Eos #        0.0 10       Normal       0.0-0.5       

 

             Baso #       0.1 10       Normal       0.0-0.2       

 

                    Comprehensive Metabolic Profil 2021          [facility]

                                        [address] [place], [place] [id number] [phone] Glucose, Fasting 96 mg/dL   Normal           

 

             Blood Urea Nitrogen 21 mg/dL     High         7-18          

 

             Creatinine For GFR 0.95 mg/dL   Normal       0.70-1.30     

 

             Glomerular Filtration Rate > 60.0       Normal       >49          2

 

             Sodium Level 142 mEq/L    Normal       136-145       

 

             Potassium Serum 4.0 mEq/L    Normal       3.5-5.1       

 

             Chloride Level 110 mEq/L    High                 

 

             Carbon Dioxide Level 24 mEq/L     Normal       21-32         

 

             Anion Gap    8 mEq/L      Normal       8-16          

 

             Calcium Level 10.0 mg/dL   Normal       8.8-10.2      

 

             Ast/Sgot     7 U/L        Normal       7-37          

 

             Alt/SGPT     19 U/L       Normal       12-78         

 

             Alkaline Phosphatase 78 U/L       Normal               

 

             Bilirubin,Total 0.8 mg/dL    Normal       0.2-1.0       

 

             Total Protein 7.6 GM/DL    Normal       6.4-8.2       

 

             Albumin      3.5 GM/DL    Normal       3.2-5.2       

 

             Albumin/Globulin Ratio 0.9          Normal                     

 

                    Laboratory test finding 2021          [facility]

                                        [address] [place], [place] [address] [phone] LDH Lactate Dehydrogenase 120 U/L    Normal     

     3

 

                    Coag Panel For Procedures 2021          [facility]

                                        [address] [place] [address] [phone] Platelet Count, Automated 312 10     Normal     150-45

0     

 

                    Prothrombin Time/Inr 2021          [facility]

enter Main Lab

                                        [address] [place] [address] [phone] Prothrombin Time 13.1 seconds Normal     12.5-14.3   

 

             Inr          0.97         Normal                    4

 

             Partial Thromboplastin Time 34.6 seconds Normal       24.2-38.5    

 

 

                    CBC With Differential 2021          [facility]

                                        [address] [place] [address] [phone] White Blood Count 10.2 10    High       4.0-10.0    

 

             Red Blood Count 4.60 10      Normal       4.30-6.10     

 

             Hemoglobin   14.9 g/dL    Normal       13.5-17.5     

 

             Hematocrit   43.3 %       Normal       42.0-52.0     

 

             Mean Corpuscular Volume 94.1 fl      Normal       80.0-96.0     

 

             Mean Corpuscular Hemoglobin 32.4 pg      Normal       27.0-33.0    

 

 

             Mean Corpuscular HGB Conc 34.4 g/dL    Normal       32.0-36.5     

 

             Red Cell Distribution Width 12.7 %       Normal       11.5-14.5    

 

 

             Platelet Count, Automated 285 10       Normal       150-450       

 

             Neutrophils % 80.3 %       High         36.0-66.0     

 

             Lymph %      8.1 %        Low          24.0-44.0     

 

             Mono %       9.7 %        High         0.0-5.0       

 

             Eos %        0.5 %        Normal       0.0-3.0       

 

             Baso %       0.7 %        Normal       0.0-1.0       

 

             Immature Granulocyte % 0.7 %        Normal       0-3.0         

 

             Nucleated Red Blood Cell % 0.0 %        Normal       0-0           

 

             Neutrophils # 8.2 10       Normal       1.5-8.5       

 

             Lymph #      0.8 10       Low          1.5-5.0       

 

             Mono #       1.0 10       High         0.0-0.8       

 

             Eos #        0.1 10       Normal       0.0-0.5       

 

             Baso #       0.1 10       Normal       0.0-0.2       

 

                    Comprehensive Metabolic Profil 2021          [facility]

                                        [address] [place] [address] [phone] Glucose, Fasting 103 mg/dL  High             

 

             Blood Urea Nitrogen 17 mg/dL     Normal       7-18          

 

             Creatinine For GFR 0.85 mg/dL   Normal       0.70-1.30     

 

             Glomerular Filtration Rate > 60.0       Normal       >49          5

 

             Sodium Level 139 mEq/L    Normal       136-145       

 

             Potassium Serum 4.1 mEq/L    Normal       3.5-5.1       

 

             Chloride Level 109 mEq/L    High                 

 

             Carbon Dioxide Level 23 mEq/L     Normal       21-32         

 

             Anion Gap    7 mEq/L      Low          8-16          

 

             Calcium Level 9.7 mg/dL    Normal       8.8-10.2      

 

             Ast/Sgot     11 U/L       Normal       7-37          

 

             Alt/SGPT     31 U/L       Normal       12-78         

 

             Alkaline Phosphatase 78 U/L       Normal               

 

             Bilirubin,Total 0.6 mg/dL    Normal       0.2-1.0       

 

             Total Protein 7.3 GM/DL    Normal       6.4-8.2       

 

             Albumin      3.1 GM/DL    Low          3.2-5.2       

 

             Albumin/Globulin Ratio 0.7          Normal                     

 

                    Laboratory test finding 2021          [facility]

                                        [address] [place], [place] [id number] [phone] LDH Lactate Dehydrogenase 107 U/L    Normal     

     6

 

                    Coag Panel For Procedures 2020          [facility]

                                        [address] [place] [address] [phone] Platelet Count, Automated 342 10     Normal     150-45

0     

 

             Partial Thromboplastin Time <pending>                              

 

 

                    Prothrombin Time/Inr 2020          [facility]

enter Main Lab

                                        [address] [place] [address] [phone] Prothrombin Time 12.9 seconds Normal     12.5-14.3   

 

             Inr          0.95         Normal                    7

 

             Partial Thromboplastin Time 32.8 seconds Normal       24.2-38.5    

 







                          1                         A Pathologist review of this

 differential can help in the

evaluation of a differential diagnosis.  Please order a

Pathologist Review (PERISM) if deemed necessary.  Results

are subject to change if a Pathologist Review is performed.



 

                          2                         Units are mL/min/1.73 m2



Chronic Kidney Disease Staging per NKF:



Stage I & II   GFR >=60       Normal to Mildly Decreased

Stage III      GFR 30-59      Moderately Decreased

Stage IV       GFR 15-29      Severely Decreased

Stage V        GFR <15        Very Little GFR Left

ESRD           GFR <15 on RRT



 

                          3                         By: PARKER GALE

Time: 1037

By: PARKER  S

Time: 1038

By: PARKER S Time: 1037 By: PARKER  S Time: 1038



 

                          4                         THERAPUTIC HUMAN INR VALUES

INDICATIONS                      NORMAL RANGES

PROPHYLAXIS/TREATMENT OF:

VENOUS THROMBOSIS                2.0-3.0

PULMONARY EMBOLISM               2.0-3.0

PREVENTION OF SYSTEMIC EMBOLISM FROM:

TISSUE HEART VALVES              2.0-3.0

ACUTE MYOCARDIAL INFARCTION      2.0-3.0

VALVULAR HEART DISEASE           2.0-3.0

ATRIAL FIBRILLATION              2.0-3.0

MECHANICAL VALVES(HIGH RISK)     2.5-3.5

RECURRENT MYOCARDIAL INFARCTION  2.5-3.5



 

                          5                         Units are mL/min/1.73 m2



Chronic Kidney Disease Staging per NKF:



Stage I & II   GFR >=60       Normal to Mildly Decreased

Stage III      GFR 30-59      Moderately Decreased

Stage IV       GFR 15-29      Severely Decreased

Stage V        GFR <15        Very Little GFR Left

ESRD           GFR <15 on RRT



 

                          6                         By: FRANCI MOTTA

Time: 854

By: FRANCI MOTTA Time: 854



 

                          7                         THERAPUTIC HUMAN INR VALUES

INDICATIONS                      NORMAL RANGES

PROPHYLAXIS/TREATMENT OF:

VENOUS THROMBOSIS                2.0-3.0

PULMONARY EMBOLISM               2.0-3.0

PREVENTION OF SYSTEMIC EMBOLISM FROM:

TISSUE HEART VALVES              2.0-3.0

ACUTE MYOCARDIAL INFARCTION      2.0-3.0

VALVULAR HEART DISEASE           2.0-3.0

ATRIAL FIBRILLATION              2.0-3.0

MECHANICAL VALVES(HIGH RISK)     2.5-3.5

RECURRENT MYOCARDIAL INFARCTION  2.5-3.5









Procedures





                Date            Code            Description     Status

 

                2020      24501           Inhaler Teaching Completed

 

                2020      08589           Spirometry      Completed







Medical Devices





                                        Description

 

                                        No Information Available







Encounters





           Type       Date       Location   Provider   Dx         Diagnosis

 

             Office Visit 2021 11:30a [facility] Moses Echeverria MD 

R91.8                                   Other nonspecific abnormal finding of barb

ng field

 

                          J43.1                     Panlobular emphysema

 

                          Z87.891                   Personal history of nicotine

 dependence

 

             Office Visit 2020 11:30a [facility] Moses Echeverria MD 

R91.8                                   Other nonspecific abnormal finding of barb

ng field

 

                          J43.1                     Panlobular emphysema

 

                          Z87.891                   Personal history of nicotine

 dependence

 

                          J90                       Pleural effusion, not elsewh

ere classified

 

             Office Visit 2020  1:45p [facility] Moses Echeverria MD 

R91.8                                   Other nonspecific abnormal finding of barb

ng field

 

                          J43.1                     Panlobular emphysema

 

                          Z87.891                   Personal history of nicotine

 dependence







Assessments





                Date            Code            Description     Provider

 

                2021      J43.1           Panlobular emphysema Bird marquez MD

 

                2021      R91.8           Other nonspecific abnormal findi

ng of lung field Bird Echeverria MD

 

                2021      R91.8           Other nonspecific abnormal findi

ng of lung field Bird Echeverria MD

 

                2021      J43.1           Panlobular emphysema Bird marquez MD

 

                2021      Z87.891         Personal history of nicotine dep

endence Bird Echeverria MD

 

                2020      R91.8           Other nonspecific abnormal findi

ng of lung field Bird Echeverria MD

 

                2020      J43.1           Panlobular emphysema Bird marquez MD

 

                2020      Z87.891         Personal history of nicotine dep

endence Bird Echeverria MD

 

                2020      J90             Pleural effusion, not elsewhere 

classified Bird Echeverria MD

 

                2020      R91.8           Other nonspecific abnormal findi

ng of lung field Bird Echeverria MD

 

                2020      J43.1           Panlobular emphysema Bird marquez MD

 

                2020      Z87.891         Personal history of nicotine dep

sulema Echeverria MD

 

                10/16/2020      J43.1           Panlobular emphysema Bird marquez MD

 

                10/16/2020      R91.8           Other nonspecific abnormal findi

ng of lung field Bird Echeverria MD

 

                10/16/2020      R09.02          Hypoxemia       Bird Echeverria MD







Plan of Treatment

Future Appointment(s):* 02/10/2021  2:00 pm - Bird Echeverria MD at [facility]

* 2021  1:30 pm - Bird Echeverria MD at [facility]

2020 - Bird Echeverria MD* R91.8 Other nonspecific abnormal finding of 
  lung field

* J43.1 Panlobular emphysema

* Z87.891 Personal history of nicotine dependence

* * New Labs:* FVL/Scooby, Scheduled: 21



* Comments:* ~ In view of the above, he is to continue current regimen.  He is 
  up to date on medications.  He is up to date on immunizations.  ~ We should 
  repeat his CT scan in mid-December, as he will be, at least, 8 weeks out with 
  palliated treatment.  I will call the results of it.~ If he is doing well and 
  has no other new issues, I will see him in return in a minimum of 4 months 
  from now with spirometry, oximetry and flow volume loop for his emphysema.  We
  await his return.



* Follow up:* CT mid Dec....will call results 4 months with fvl









Functional Status





                                        Description

 

                                        No Information Available







Mental Status





                                        Description

 

                                        No Information Available







Referrals





                Refer to Dr     Reason for Referral Status          Appt Date

 

                Radiology/Procedure 18002           Created         

 

                                          

 

                Radiology/Procedure 65165           Closed          

 

                                          

 

                Radiology/Procedure 44078           Closed          

 

                                          

 

                Radiology/Procedure no site selection required approved [id number]  C

losed          

 

                                          

 

                          Radiology/Procedure       40366 CHT CHEST NO CONTRAST-

[id number] VALID 20-3/27/21

                          Closed
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
## 2021-02-10 NOTE — CCD
Continuity of Care Document (CCD)

                             Created on: 2021



Adams Barron

External Reference #: [id number]

: 1955

Sex: Male



Demographics





                          Address                   [address] [place] [address]

 

                          Home Phone                [phone]

 

                          Preferred Language        Unknown

 

                          Marital Status            Unknown

 

                          Ep[employer]opalian Affiliation     Unknown

 

                          Race                      White

 

                          Ethnic Group              Not  or 





Author





                          Organization              Unknown

 

                          Address                   Unknown

 

                          Phone                     Unavailable







Care Team Providers





                    Care Team Member Name Role                Phone

 

                    [facility] - Dermatology [facility]            

    [phone]

 

                    [facility] [place] - Urology [facility]                [phone]

 

                    Alan Humphries MD [facility]                [phone]

 

                    [facility] - Pul[facility]               

 [phone]







Problems





                    Active Problems     Provider            Date

 

                    Benign essential hypertension Natalia Andre M.D. Onset

: 2012







Social History





                Type            Date            Description     Comments

 

                Birth Sex                       Unknown          

 

                Tobacco Use     Start: Unknown End: Unknown Current Cigarette Sm

oker Packs Daily 1  

 

                Tobacco Use     Start: Unknown  Never Smoked Cigars  

 

                Tobacco Use     Start: Unknown  Never Smoked A Pipe  

 

                Tobacco Use     Start: Unknown  Never Used Smokeless Tobacco  

 

                ETOH Use                        Denies alcohol use  

 

                Recreational Drug Use                 Denies Drug Use  

 

                Tobacco Use     Start: Unknown End:  Patient is a former

 smoker  

 

                Smoking Status  Reviewed: 20 Patient is a former smoker  

 

                Exercise Type/Frequency                 Exercises sporadically  

 

                Seat Belt/Car Seat                 Always uses seat belt  







Allergies, Adverse Reactions, Alerts





             Active Allergies Reaction     Severity     Comments     Date

 

             Bactrim                                             2015

 

                                        Inactive Allergies

 

             NKDA                                                2004







Medications





           Active Medications SIG        Qnty       Indications Ordering Provide

r Date

 

                          Spiriva Respimat                     2.5mcg/Act Aeroso

l                   inhale

2 spray(s) by mouth once daily 4units                          Natalia Andre M.D. 2018

 

                          Proair HFA                     108(90Base) mcg/Act Aer

osol                   2 

puffs every 4 hours as needed as needed for cough or sob 8.500gm                

                 Natalia Andre M.D.                          

 

                          Vitamin D-3                     1000Unit Capsules     

              1 by mouth 

every day                                       Unknown         

 

                                        History Medications

 

                          Ceftin                     500mg Tablets              

     1 by mouth twice a 

day x 5 days                                    Unknown         10/19/2020 - 10/



 

                          Doxycycline Hyclate                     100mg Tablets 

                  1 tab by

mouth twice a day for 5 days                                 Unknown         10/

 - 10/24/2020







Immunizations





             CPT Code     Status       Date         Vaccine      Lot #

 

                [id number]           Given           10/18/2020      Influenza Virus 

Vaccine, Quadrivalent,age 3 and 

up,multidose vial                        

 

             25251        Given        2020   Pneumococcal Vaccine [id number]

 

                [id number]           Given           10/09/2019      Influenza Virus 

Vaccine, Quadrivalent,age 3 and 

up,multidose vial                        

 

             96573        Given        2019   Prevnar 13 For Adults [id number]

 

                13002           Given           2018      Influenza Virus,

 quadravalent, preservative free,age 3 

and up                                  [id number]

 

                [id number]           Given           2018      Influenza Virus 

Vaccine, Quadrivalent,age 3 and 

up,multidose vial                        

 

             70622        Given        2015   Influenza Vaccination  

 

             66935        Given        2015   Influenza Vaccination [id number]

A

 

             24978        Given        2015   Adacel 11 Yrs or older 

AA







Vital Signs





                Date            Vital           Result          Comment

 

                2020 11:53am BP Systolic     153 mmHg         

 

                    BP Diastolic        85 mmHg              

 

                    BP Systolic Recheck 149 mmHg            recheck

 

                    BP Diastolic Recheck 85 mmHg             recheck

 

                    Heart Rate          88 /min              

 

                    Body Temperature    97.3 F             

 

                    Respiratory Rate    18 /min              

 

                    Height              70 inches           5'10"

 

                    Weight              153.38 lb            

 

                    O2 % BldC Oximetry  98 %                 

 

                    Peak Expiratory Flow Rate 505                 Estimated Peak

 Flow Rate

 

                    Ideal Body Weight   166 lb               

 

                    BMI (Body Mass Index) 22.0 kg/m2           

 

                10/27/2020  9:01am BP Systolic     150 mmHg         

 

                    BP Diastolic        82 mmHg              

 

                    BP Systolic Recheck 124 mmHg             

 

                    BP Diastolic Recheck 81 mmHg              

 

                    Heart Rate          111 /min             

 

                    Body Temperature    96.6 F             

 

                    Respiratory Rate    20 /min              

 

                    Height              70 inches           5'10"

 

                    Weight              150.12 lb            

 

                    O2 % BldC Oximetry  97 %                 

 

                    Peak Expiratory Flow Rate 505                 Estimated Peak

 Flow Rate

 

                    Ideal Body Weight   166 lb               

 

                    BMI (Body Mass Index) 21.5 kg/m2           







Results





        Test    Acquired Date Facility Test    Result  H/L     Range   Note

 

                    CBC With Differential 2021          Patient Service Ce

[facility] [place] [address] [phone] White Blood Count 10.2 10    High       4.0-10.0    

 

             Red Blood Count 4.60 10      Normal       4.30-6.10     

 

             Hemoglobin   14.9 g/dL    Normal       13.5-17.5     

 

             Hematocrit   43.3 %       Normal       42.0-52.0     

 

             Mean Corpuscular Volume 94.1 fl      Normal       80.0-96.0     

 

             Mean Corpuscular Hemoglobin 32.4 pg      Normal       27.0-33.0    

 

 

             Mean Corpuscular HGB Conc 34.4 g/dL    Normal       32.0-36.5     

 

             Red Cell Distribution Width 12.7 %       Normal       11.5-14.5    

 

 

             Platelet Count, Automated 285 10       Normal       150-450       

 

             Neutrophils % 80.3 %       High         36.0-66.0     

 

             Lymph %      8.1 %        Low          24.0-44.0     

 

             Mono %       9.7 %        High         0.0-5.0       

 

             Eos %        0.5 %        Normal       0.0-3.0       

 

             Baso %       0.7 %        Normal       0.0-1.0       

 

             Immature Granulocyte % 0.7 %        Normal       0-3.0         

 

             Nucleated Red Blood Cell % 0.0 %        Normal       0-0           

 

             Neutrophils # 8.2 10       Normal       1.5-8.5       

 

             Lymph #      0.8 10       Low          1.5-5.0       

 

             Mono #       1.0 10       High         0.0-0.8       

 

             Eos #        0.1 10       Normal       0.0-0.5       

 

             Baso #       0.1 10       Normal       0.0-0.2       

 

                    Comprehensive Metabolic Profil 2021          Patient S

[place] [id number] [phone] Glucose, Fasting 103 mg/dL  High             

 

             Blood Urea Nitrogen 17 mg/dL     Normal       7-18          

 

             Creatinine For GFR 0.85 mg/dL   Normal       0.70-1.30     

 

             Glomerular Filtration Rate > 60.0       Normal       >49          1

 

             Sodium Level 139 mEq/L    Normal       136-145       

 

             Potassium Serum 4.1 mEq/L    Normal       3.5-5.1       

 

             Chloride Level 109 mEq/L    High                 

 

             Carbon Dioxide Level 23 mEq/L     Normal       21-32         

 

             Anion Gap    7 mEq/L      Low          8-16          

 

             Calcium Level 9.7 mg/dL    Normal       8.8-10.2      

 

             Ast/Sgot     11 U/L       Normal       7-37          

 

             Alt/SGPT     31 U/L       Normal       12-78         

 

             Alkaline Phosphatase 78 U/L       Normal               

 

             Bilirubin,Total 0.6 mg/dL    Normal       0.2-1.0       

 

             Total Protein 7.3 GM/DL    Normal       6.4-8.2       

 

             Albumin      3.1 GM/DL    Low          3.2-5.2       

 

             Albumin/Globulin Ratio 0.7          Normal                     

 

                    Laboratory test finding 2021          Patient Service 

Center

[place] [address] [phone] LDH Lactate Dehydrogenase 107 U/L    Normal     

     2

 

                    Istat Chem8+ Panel  10/16/2020          [place] [address] [phone] iSTAT HCT  65.0 %     High       38.0-51.0   

 

             iSTAT Glucose 154 mg/dL    High                 

 

             iSTAT Sodium 140 mEq/L    Normal       136-145       

 

             iSTAT Potassium 3.4 mEq/L    Low          3.5-5.1       

 

             iSTAT CA++   5.0 mg/dL    Normal       4.5-5.3       

 

             iSTAT Chloride 104 mEq/L    Normal               

 

             iSTAT Co2    19.0 MM/L    Low          23.0-27.0     

 

             iSTAT BUN    24 mg/dL     Normal       8-26          

 

             iSTAT Creatinine 0.6 mg/dL    Normal       0.6-1.3       

 

                    Laboratory test finding 10/16/2020          Patient Service 

Center

[place] [address] [phone] iSTAT Troponin 0.00 NG/ML Normal     0.00-0.08   

 

                    Laboratory test finding 10/16/2020          Patient Service 

[place] [address] [phone] iSTAT Lactate 1.04       Normal     0.4-2.0     

 

                    Venous Blood Gas    10/16/2020          [place] [address] [phone] Venous PH  7.476 units High       7.330-7.430  

 

             Venous Partial Pressure Co2 28.1 mmHg    Low          38.0-50.0    

 

 

             Venous Partial Pressure O2 140.0 mmHg   High         30.0-50.0     

 

             Venous Total Co2 21.1 mEq/L   Low          24.0-28.0     

 

             Venous Hco3  20.3 mEq/L   Low          23.0-27.0     

 

             Venous Base Excess -1.9         Normal       -2.0-2.0      

 

             Venous Standard Hco3 22.9 mEq/L   Normal                     

 

             Venous O2 Saturation 98.9 %       High         60.0-80.0     

 

                    CBC With Differential 10/16/2020          Patient Service Ce

nter

[facility] [place] [address] [phone] White Blood Count 13.3 10    High       4.0-10.0    

 

             Red Blood Count 4.01 10      Low          4.30-6.10     

 

             Hemoglobin   13.1 g/dL    Low          13.5-17.5     

 

             Hematocrit   37.4 %       Low          42.0-52.0     

 

             Mean Corpuscular Volume 93.3 fl      Normal       80.0-96.0     

 

             Mean Corpuscular Hemoglobin 32.7 pg      Normal       27.0-33.0    

 

 

             Mean Corpuscular HGB Conc 35.0 g/dL    Normal       32.0-36.5     

 

             Red Cell Distribution Width 12.4 %       Normal       11.5-14.5    

 

 

             Platelet Count, Automated 344 10       Normal       150-450       

 

             Neutrophils % 86.7 %       High         36.0-66.0     

 

             Lymph %      2.6 %        Low          24.0-44.0     

 

             Mono %       9.4 %        High         0.0-5.0       

 

             Eos %        0.2 %        Normal       0.0-3.0       

 

             Baso %       0.4 %        Normal       0.0-1.0       

 

             Immature Granulocyte % 0.7 %        Normal       0-3.0         

 

             Nucleated Red Blood Cell % 0.0 %        Normal       0-0           

 

             Neutrophils # 11.5 10      High         1.5-8.5       

 

             Lymph #      0.3 10       Low          1.5-5.0       

 

             Mono #       1.2 10       High         0.0-0.8       

 

             Eos #        0.0 10       Normal       0.0-0.5       

 

             Baso #       0.1 10       Normal       0.0-0.2       

 

                    Prothrombin Time/Inr 10/16/2020          Patient Service [facility]

ter

[facility] [place] [address] [phone] Prothrombin Time 14.7 seconds High       12.5-14.3   

 

             Inr          1.13         Normal                    3

 

                    Liver Profile       10/16/2020          Patient Service Cent

er

[facility] [place] [address] [phone] Ast/Sgot   24 U/L     Normal     7-37        

 

             Alt/SGPT     35 U/L       Normal       12-78         

 

             Alkaline Phosphatase 105 U/L      Normal               

 

             Bilirubin,Total 1.5 mg/dL    High         0.2-1.0       

 

             Bilirubin,Direct 0.9 mg/dL    High         0.0-0.2       

 

             Total Protein 6.0 GM/DL    Low          6.4-8.2       

 

             Albumin      2.3 GM/DL    Low          3.2-5.2       

 

             Albumin/Globulin Ratio 0.6          Normal                     

 

                    Laboratory test finding 10/16/2020          Patient Service 

Center

[place], [place] [id number] [phone] NT-Pro  pg/mL  High       <125        

 

             Thyroid Stimulating Hormone 0.480 uIU/ML Normal       0.358-3.740  

 

 

                    Venous Blood Gas    10/16/2020          [place] [address] [phone] Venous PH  7.476 units High       7.330-7.430  

 

             Venous Partial Pressure Co2 28.1 mmHg    Low          38.0-50.0    

 

 

             Venous Partial Pressure O2 140.0 mmHg   High         30.0-50.0     

 

             Venous Total Co2 21.1 mEq/L   Low          24.0-28.0     

 

             Venous Hco3  20.3 mEq/L   Low          23.0-27.0     

 

             Venous Base Excess -1.9         Normal       -2.0-2.0      

 

             Venous Standard Hco3 22.9 mEq/L   Normal                     

 

             Venous O2 Saturation 98.9 %       High         60.0-80.0     







                          1                         Units are mL/min/1.73 m2



Chronic Kidney Disease Staging per NKF:



Stage I & II   GFR >=60       Normal to Mildly Decreased

Stage III      GFR 30-59      Moderately Decreased

Stage IV       GFR 15-29      Severely Decreased

Stage V        GFR <15        Very Little GFR Left

ESRD           GFR <15 on RRT



 

                          2                         By: FRANCI MOTTA

Time: 08

By: FRANCI MOTTA Time: 854



 

                          3                         THERAPUTIC HUMAN INR VALUES

INDICATIONS                      NORMAL RANGES

PROPHYLAXIS/TREATMENT OF:

VENOUS THROMBOSIS                2.0-3.0

PULMONARY EMBOLISM               2.0-3.0

PREVENTION OF SYSTEMIC EMBOLISM FROM:

TISSUE HEART VALVES              2.0-3.0

ACUTE MYOCARDIAL INFARCTION      2.0-3.0

VALVULAR HEART DISEASE           2.0-3.0

ATRIAL FIBRILLATION              2.0-3.0

MECHANICAL VALVES(HIGH RISK)     2.5-3.5

RECURRENT MYOCARDIAL INFARCTION  2.5-3.5









Procedures





                                        Description

 

                                        No Information Available







Medical Devices





                                        Description

 

                                        No Information Available







Encounters





           Type       Date       Location   Provider   Dx         Diagnosis

 

           Office Visit 2020 11:45a Main Office Natalia Andre M.D. R

03.0      

Elevated blood-pressure reading, w/o diagnosis of htn

 

                          J44.9                     Chronic obstructive pulmonar

y disease, unspecified

 

                          Z13.89                    Encounter for screening for 

other disorder

 

           Office Visit 10/27/2020  9:00a Main Office Natalia Andre M.D. J

44.9      

Chronic obstructive pulmonary disease, unspecified

 

                          J18.9                     Pneumonia, unspecified organ

ism







Assessments





                Date            Code            Description     Provider

 

                2020      R03.0           Elevated blood-pressure reading,

 without diagnosis of hypert 

Natalia Andre M.D.

 

                2020      J44.9           Chronic obstructive pulmonary di

sease, unspecified Natalia Andre M.D.

 

                2020      Z13.89          Encounter for screening for othe

r disorder Natalia Andre M.D.

 

                10/27/2020      J44.9           Chronic obstructive pulmonary di

sease, unspecified Natalia Andre M.D.

 

                10/27/2020      J18.9           Pneumonia, unspecified organism 

Natalia Andre M.D.







Plan of Treatment

Future Appointment(s):* 2021 11:00 am - Natalia Andre M.D. at Main 
  Office

2020 - Natalia Andre M.D.* R03.0 Elevated blood-pressure reading, 
  without diagnosis of hypert

* J44.9 Chronic obstructive pulmonary disease, unspecified* Comments:* I  spoke 
  with patients pulmonologist, Dr. Echeverria.  He is going to make sure an f/u appt
  is arranged.  Right now no changes.



* Follow up:* 3-4 months





* Z13.89 Encounter for screening for other disorder





Goals

2020 - Natalia Andre M.D.* R03.0 Elevated blood-pressure reading, 
  without diagnosis of hypert* Your blood pressure is elevated.  Focus on a 
  reduced calorie, lower sodium diet as well as increased exercise and weight 
  loss to improve blood pressure readings.  







Functional Status





                Functional Condition Comment         Date            Status

 

                Bifocal glasses                                 Active

 

                Independent with all ADL's                                 Activ

e

 

                Independent with all IADL's                                 Acti

ve

 

                Hearing Aid in both ears                                 Active







Mental Status





                Mental Condition Comment         Date            Status

 

                None                                            Active







Referrals





                                        Description

 

                                        No Information Available

## 2021-02-10 NOTE — CCD
Summarization Of Episode

                             Created on: 02/10/2021



MONSERRAT BARRON

External Reference #: [id number]

: 1955

Sex: Male



Demographics





                          Address                   [address] [id number]

 

                          Home Phone                [phone]

 

                          Preferred Language        English

 

                          Marital Status            

 

                          Jew Affiliation     NONE

 

                          Race                      White

 

                          Ethnic Group              Not  or 





Author





                          Author                    [facility]

 

                          Organization              [facility]

 

                          Address                   Unknown

 

                          Phone                     Unavailable







Support





                Name            Relationship    Address         Phone

 

                RE              Next Of Kin     Unknown         Unavailable

 

                    [facility] [employer] [facility] [place] [address]                    [phone]

 

                    [facility]              Next Of Kin         [address] [place]  [address]                    [phone]

 

                    SHAHANA ([facility]) ANITA Next Of Kin         [address] [place]  [address]                    [phone]

 

                    Anita Barron ECON                [address] [place]  [address]                    [phone]







Care Team Providers





                    Care Team Member Name Role                Phone

 

                    SKYLAR Andre MD Unavailable         Unavailable

 

                    Thai, SKYLAR Fisher MD Unavailable         Unavailable

 

                    Thai, SKYLAR Fisher MD Unavailable         Unavailable

 

                    Thai, SKYLAR Fisher MD Unavailable         Unavailable

 

                    Thai, SKYLAR Fisher MD Unavailable         Unavailable

 

                    Thai, SKYLAR Fisher MD Unavailable         Unavailable

 

                    Thai, SKYLAR Fisher MD Unavailable         Unavailable

 

                    Thai, SKYLAR Fisher MD Unavailable         Unavailable

 

                    Thai, SKYLAR Fisher MD Unavailable         Unavailable

 

                    Thai, SKYLAR Fisher MD Unavailable         Unavailable

 

                    Thai, SKYLAR Fisher MD Unavailable         Unavailable

 

                    Thai, SKYLAR Fisher MD Unavailable         Unavailable

 

                    Thai, SKYLAR Fisher MD Unavailable         Unavailable

 

                    Thai, SKYLAR Fisher MD Unavailable         Unavailable

 

                    Thai, SKYLAR Fisher MD Unavailable         Unavailable

 

                    Thai, SKYLAR Fisher MD Unavailable         Unavailable

 

                    Thai, SKYLAR Fisher MD Unavailable         Unavailable

 

                    Thai, SKYLAR Fisher MD Unavailable         Unavailable

 

                    Thai, SKYLAR Fisher MD Unavailable         Unavailable

 

                    Thai, SKYLAR Fisher MD Unavailable         Unavailable

 

                    Thai, SKYLAR Fisher MD Unavailable         Unavailable

 

                    Thai, SKYLAR Fisher MD Unavailable         Unavailable

 

                    Thai, SKYLAR Fisher MD Unavailable         Unavailable

 

                    Thai, SKYLAR Fisher MD Unavailable         Unavailable

 

                    Thai, SKYLAR Fisher MD Unavailable         Unavailable

 

                    Thai, SKYLAR Fisher MD Unavailable         Unavailable

 

                    Thai, SKYLAR Fisher MD Unavailable         Unavailable

 

                    Thai, SKYLAR Fisher MD Unavailable         Unavailable

 

                    Thai, SKYLAR Fisher MD Unavailable         Unavailable

 

                    Thai, SKYLAR Fisher MD Unavailable         Unavailable

 

                    Thai, SKYLAR Fisher MD Unavailable         Unavailable

 

                    Thai, SKYLAR Fisher MD Unavailable         Unavailable

 

                    Thai, SKYLAR Fisher MD Unavailable         Unavailable

 

                    Thai, SKYLAR Fisher MD Unavailable         Unavailable

 

                    Thai, SKYLAR Fisher MD Unavailable         Unavailable

 

                    Thai, SKYLAR Fisher MD Unavailable         Unavailable

 

                    Thai, SKYLAR Fisher MD Unavailable         Unavailable

 

                    Thai, SKYLAR Fisher MD Unavailable         Unavailable

 

                    Thai, SKYLAR Fisher MD Unavailable         Unavailable

 

                    Thai, SKYLAR Fisher MD Unavailable         Unavailable

 

                    Thai, SKYLAR Fisher MD Unavailable         Unavailable

 

                    Thai, SKYLAR Fisher MD Unavailable         Unavailable

 

                    Thai, SKYLAR Fisher MD Unavailable         Unavailable

 

                    Thai, SKYLAR Fisher MD Unavailable         Unavailable

 

                    Thai, SKYLAR Fisher MD Unavailable         Unavailable

 

                    Thai, SKYLAR Fisher MD Unavailable         Unavailable

 

                    Thai, SKYLAR Fisher MD Unavailable         Unavailable

 

                    Thai, SKYLAR Fisher MD Unavailable         Unavailable

 

                    Thai, SKYLAR Fisher MD Unavailable         Unavailable

 

                    Thai, SKYLAR Fisher MD Unavailable         Unavailable

 

                    Thai, SKYLAR Fisher MD Unavailable         Unavailable

 

                    Thai, SKYLAR Fisher MD Unavailable         Unavailable

 

                    Thai, SKYLAR Fisher MD Unavailable         Unavailable

 

                    Thai, SKYLAR Fisher MD Unavailable         Unavailable

 

                    Thai, SKYLAR Fisher MD Unavailable         Unavailable

 

                    Thai, SKYLAR Fisher MD Unavailable         Unavailable

 

                    Thai, SKYLAR Fisher MD Unavailable         Unavailable

 

                    Thai, SKYLAR Fisher MD Unavailable         Unavailable

 

                    Thai, SKYLAR Fisher MD Unavailable         Unavailable

 

                    Thai, SKYLAR Fisher MD Unavailable         Unavailable

 

                    Thai, SKYLAR Fisher MD Unavailable         Unavailable

 

                    Thai, SKYLAR Fisher MD Unavailable         Unavailable

 

                    Thai, SKYLAR Fisher MD Unavailable         Unavailable

 

                    Thai, SKYLAR Fisher MD Unavailable         Unavailable

 

                    Thai, SKYLAR Fisher MD Unavailable         Unavailable

 

                    Thai, SKYLAR Fisher MD Unavailable         Unavailable

 

                    Thai, SKYLAR Fisher MD Unavailable         Unavailable

 

                    Thai, SKYLAR Fisher MD Unavailable         Unavailable

 

                    Thai, SKYLAR Fisher MD Unavailable         Unavailable

 

                    Thai, SKYLAR Fisher MD Unavailable         Unavailable

 

                    Thai, SKYLAR Fisher MD Unavailable         Unavailable

 

                    Thai, SKYLAR Fisher MD Unavailable         Unavailable

 

                    Thai, SKYLAR Fisher MD Unavailable         Unavailable

 

                    Thai, SKYLAR Fisher MD Unavailable         Unavailable

 

                    Thai, SKYLAR Fisher MD Unavailable         Unavailable

 

                    Scordo, M Melanie PA Unavailable         Unavailable

 

                    Scordo, M Melanie PA Unavailable         Unavailable

 

                    Scordo, M Melanie PA Unavailable         Unavailable

 

                    Scordo, M Melanie PA Unavailable         Unavailable

 

                    Scordo, M Melanie PA Unavailable         Unavailable

 

                    Scordo, M Melanie PA Unavailable         Unavailable

 

                    Scordo, M Melanie PA Unavailable         Unavailable

 

                    Scordo, M Melanie PA Unavailable         Unavailable

 

                    Scordo, M Melanie PA Unavailable         Unavailable

 

                    Scordo, M Melanie PA Unavailable         Unavailable

 

                    Scordo, M Melanie PA Unavailable         Unavailable

 

                    Scordo, M Melanie PA Unavailable         Unavailable

 

                    Scordo, M Melanie PA Unavailable         Unavailable

 

                    Scordo, M Melanie PA Unavailable         Unavailable

 

                    Scordo, M Melanie PA Unavailable         Unavailable

 

                    Scordo, M Melanie PA Unavailable         Unavailable

 

                    Scordo, M Melanie PA Unavailable         Unavailable

 

                    Scordo, M Melanie PA Unavailable         Unavailable

 

                    Scordo, M Mealnie PA Unavailable         Unavailable

 

                    Scordo, M Melanie PA Unavailable         Unavailable

 

                    Scordo, M Melanie PA Unavailable         Unavailable

 

                    Scordo, M Melanie PA Unavailable         Unavailable

 

                    Scordo, M Melanie PA Unavailable         Unavailable

 

                    Scordo, M Melanie PA Unavailable         Unavailable

 

                    Scordo, M Melanie PA Unavailable         Unavailable

 

                    Scordo, M Melanie PA Unavailable         Unavailable

 

                    Scordo, M Melanie PA Unavailable         Unavailable

 

                    Scordo, M Melanie PA Unavailable         Unavailable

 

                    Scordo, M Melanie PA Unavailable         Unavailable

 

                    Scordo, M Melanie PA Unavailable         Unavailable

 

                    Scordo, M Melanie PA Unavailable         Unavailable

 

                    Scordo, M Melanie PA Unavailable         Unavailable

 

                    Scordo, M Melanie PA Unavailable         Unavailable

 

                    Scordo, M Melanie PA Unavailable         Unavailable

 

                    Scordo, M Melanie PA Unavailable         Unavailable

 

                    Scordo, M Melanie PA Unavailable         Unavailable

 

                    Scordo, M Melanie PA Unavailable         Unavailable

 

                    Scordo, M Melanie PA Unavailable         Unavailable

 

                    Scordo, M Melanie PA Unavailable         Unavailable

 

                    Scordo, M Melanie PA Unavailable         Unavailable

 

                    Scordo, M Melanie PA Unavailable         Unavailable

 

                    Scordo, M Melanie PA Unavailable         Unavailable

 

                    THEA Fagan     Unavailable         Unavailable

 

                    Kris Echeverria MD Unavailable         Unavailable

 

                    Kris Echeverria MD Unavailable         Unavailable

 

                    Kris Echeverria MD Unavailable         Unavailable

 

                    Kris Echeverria MD Unavailable         Unavailable

 

                    Kris Echeverria MD Unavailable         Unavailable

 

                    Kris Echeverria MD Unavailable         Unavailable

 

                    Kris Echeverria MD Unavailable         Unavailable

 

                    Kris Echeverria MD Unavailable         Unavailable

 

                    Kris Echeverria MD Unavailable         Unavailable

 

                    Kris Echeverria MD Unavailable         Unavailable

 

                    Kris Echeverria MD Unavailable         Unavailable

 

                    Kris Echeverria MD Unavailable         Unavailable

 

                    Kris Echeverria MD Unavailable         Unavailable

 

                    Kris Echeverria MD Unavailable         Unavailable

 

                    Kris Echeverria MD Unavailable         Unavailable

 

                    Kris Echeverria MD Unavailable         Unavailable

 

                    Kris Echeverria MD Unavailable         Unavailable

 

                    Kris Echeverria MD Unavailable         Unavailable

 

                    Kris Echeverria MD Unavailable         Unavailable

 

                    Kris Echeverria MD Unavailable         Unavailable

 

                    Kris Echeverria MD Unavailable         Unavailable

 

                    Kris Echeverria MD Unavailable         Unavailable

 

                    Kris Echeverria MD Unavailable         Unavailable

 

                    Kris Echeverria MD Unavailable         Unavailable

 

                    Kris Echeverria MD Unavailable         Unavailable

 

                    Kris Echeverria MD Unavailable         Unavailable

 

                    Kris Echeverria MD Unavailable         Unavailable

 

                    Kris Echeverria MD Unavailable         Unavailable

 

                    Kris Echeverria MD Unavailable         Unavailable

 

                    Kris Echeverria MD Unavailable         Unavailable

 

                    Kris Echeverria MD Unavailable         Unavailable

 

                    Kris Echeverria MD Unavailable         Unavailable

 

                    Kris Echeverria MD Unavailable         Unavailable

 

                    Kris Echeverria MD Unavailable         Unavailable

 

                    Echeverria, Kris Pang MD Unavailable         Unavailable

 

                    Echeverria, Kris Pang MD Unavailable         Unavailable

 

                    Echeverria, Kris Pang MD Unavailable         Unavailable

 

                    Echeverria, Kris Pang MD Unavailable         Unavailable

 

                    Echeverria, Kris Pang MD Unavailable         Unavailable

 

                    Echeverria, Kris Pang MD Unavailable         Unavailable

 

                    Echeverria, Kris Pang MD Unavailable         Unavailable

 

                    Echeverria, Kris Pang MD Unavailable         Unavailable

 

                    Echeverria, Kris Pang MD Unavailable         Unavailable

 

                    Echeverria, Kris Pang MD Unavailable         Unavailable

 

                    Echeverria, Kris Pang MD Unavailable         Unavailable

 

                    Echeverria, Kris Pang MD Unavailable         Unavailable

 

                    Echeverria, Kris Pang MD Unavailable         Unavailable

 

                    Echeverria, Kris Pang MD Unavailable         Unavailable

 

                    Echeverria, Kris Pang MD Unavailable         Unavailable

 

                    Echeverria, Kris Pang MD Unavailable         Unavailable

 

                    Echeverria, Kris Pang MD Unavailable         Unavailable



                                  



Re-disclosure Warning

          The records that you are about to access may contain information from 
federally-assisted alcohol or drug abuse programs. If such information is 
present, then the following federally mandated warning applies: This information
has been disclosed to you from records protected by federal confidentiality 
rules (42 CFR part 2). The federal rules prohibit you from making any further 
disclosure of this information unless further disclosure is expressly permitted 
by the written consent of the person to whom it pertains or as otherwise 
permitted by 42 CFR part 2. A general authorization for the release of medical 
or other information is NOT sufficient for this purpose. The Federal rules 
restrict any use of the information to criminally investigate or prosecute any 
alcohol or drug abuse patient.The records that you are about to access may 
contain highly sensitive health information, the redisclosure of which is 
protected by Article 27-F of the [facility] Public Health law. If you 
continue you may have access to information: Regarding HIV / AIDS; Provided by 
facilities licensed or operated by the [facility] Office of Mental Health; 
or Provided by the [facility] Office for People With Developmental 
Disabilities. If such information is present, then the following [place] 
mandated warning applies: This information has been disclosed to you from 
confidential records which are protected by state law. State law prohibits you 
from making any further disclosure of this information without the specific 
written consent of the person to whom it pertains, or as otherwise permitted by 
law. Any unauthorized further disclosure in violation of state law may result in
a fine or residential sentence or both. A general authorization for the release of 
medical or other information is NOT sufficient authorization for further disc
losure.                                                                         
    



Family History

          



             Family Member Name Family Member Gender Family Member Status Date o

f Status 

Description                             Data Source(s)

 

           Unknown    Unknown    Problem                          [facility] ([facility]

 

           Unknown    Unknown    Problem                          [facility] ([facility]

 

           Unknown    Male       Problem                          [facility] ([facility]

 

           Unknown    Unknown                                      



                                                                                
                                     



Encounters

          



           Encounter  Providers  Location   Date       Indications Data Source(s

)

 

             Outpatient   Referrer: Cory Fagan              2021 08:51:0

0 AM EST Solitary 

pulmonary nodule                        [facility]

 

                                        Solitary pulmonary nodule 

 

                Office Visit    Attender: Bird Munguia/Deshler/Trace/R

eindl 2021 

10:30:00 AM EST                                     [facility] ([facility]

[facility], )

 

                Outpatient      Attender: Bird Munguia/Blessing/Trace/R

eindl 2020 

10:30:00 AM EST                                     [facility] ([facility]

[facility], )

 

           Outpatient Attender: Natalia Andre MD Main Office 2020 10:45:0

0 AM EST            

[facility] ([facility]

 

                [facility]                 [address] [place] [address] 

2020 12:00:00 AM EST                           eCW1 ([facility])

 

                Office Visit    Attender: Bird Munguia/Blessing/Trace/R

eindl 2020 

12:45:00 PM EST                                     [facility] ([facility]

actice, )

 

           Outpatient Attender: Natalia Andre MD Main Office 10/27/2020 09:00:0

0 AM EDT            

[facility] ([facility]

 

           Outpatient Attender: Melanie SALAS Main Office 2020 09:40:00

 AM EDT            

[facility] ([facility]



                                                                                
                                                                             



Immunizations

          



             Vaccine      Date         Status       Description  Data Source(s)

 

             New in .  IIV4 10/18/2020 09:00:00 AM EDT completed            

     [facility] ([facility]

 

             pneumococcal polysaccharide PPV23 2020 07:16:00 AM EDT comple

familia                 [facility] 

([facility]



                                                                                
                 



Medications

          



          Medication Brand Name Start Date Product Form Dose      Route     Admi

nistrative 

Instructions Pharmacy Instructions Status     Indications Reaction   Description

 Data 

Source(s)

 

           Cefuroxime 500 MG Oral Tablet Cefuroxime Axetil 2021 12:00:00 A

M EST                       

ORAL                          completed                               [facility] ([facility])

 

           Cefuroxime 500 MG Oral Tablet Cefuroxime Axetil 2020 12:00:00 A

M EST                       

ORAL                          completed                               [facility] ([facility])

 

                    doxycycline hyclate 100 MG Oral Tablet Doxycycline Hyclate 1

 12:00:00 

AM EDT               ORAL                 completed                      [facility] 

([facility]

 

           Cefuroxime 500 MG Oral Tablet [Ceftin] Ceftin     10/19/2020 12:00:00

 AM EDT                       

ORAL                          completed                               [facility] ([facility]

 

           Levofloxacin 500 MG Oral Tablet Levofloxacin 10/12/2020 12:00:00 AM E

DT                       ORAL

                              completed                               [facility] ([facility])



                                                                                
                            



Insurance Providers

          



             Payer name   Policy type / Coverage type Policy ID    Covered party

 ID Covered 

party's relationship to cheek Policy Cheek             Plan Information

 

          MEDICARE BLUE PPO      [id number]           [id number]           SP        

          [id number]

 

          [employer] MED[employer] BLUE PPO G         [id number]           Self      

          [id number]

 

          [employer] MED[employer] BLUE PPO G         [id number]           Self      

          [id number]

 

          [employer] BCBS B         [id number]           S                   [id number]

[id number]

 

          MEDICARE            [id number]           SP                  [id number]

[id number]

 

          [facility] C[facility]           [id number]           SP                  [id number]

[id number]

 

          MEDICARE BLUE PPO      [id number]           [id number]           SP         

         [id number]

 

          MEDICARE BLUE PPO      306           [id number]           SP         

         [id number]

 

          [employer]BS Medicare Commercial [id number]           Self                [id number]

[id number]

 

          [facility]       Health Maintenance Organization (HMO) [id number]           

Self                [id number]

 

          Medicare Blue Ppo Commercial [id number]           Self              

  [id number]

 

          [employer]BS Medicare Commercial [id number]           Self                vy

[id number]

 

                    ANSI-Commercial [id number]        

                       

[id number]

 

                    ANSI-Not a Secondary Insurance [id number]

[id number]                               

[id number]

 

                    ANSI-Commercial [id number]        

                       

[id number]

 

                    ANSI-Not a Secondary Insurance [id number]

[id number]                               

[id number]

 

          [facility]           [id number]           SP                  [id number]

[id number]

 

                    ANSI-Commercial [id number]        

                       

[id number]

 

                    ANSI-Not a Secondary Insurance [id number]

[id number]                               

[id number]

 

          [facility] Health Care Commercial [id number]           Self                [id number]

[id number]

 

          [facility] Health Care Commercial [id number]           Self                [id number]

[id number]

 

          [facility] [facility] [id number]           Self                8

[id number]

 

          [facility]       Health Maintenance Organization (HMO) [id number]           

Self                [id number]

 

          [facility] HEALTH CARE           [id number]           SP                  82

[id number]

 

          [facility] HEALTH CARE O         [id number]           S                   82

[id number]

 

          [facility] Commercial [id number]           Self                8

[id number]

 

          [facility] HEALTH CARE           [id number]           SP                  82

[id number]

 

          [facility] Commercial [id number]           Self                [phone]

[id number]

 

          [facility]  [facility] [id number]           Self                [id number]

[id number]

 

          [facility]       Health Maintenance Organization (HMO) [id number]           

Self                [id number]

 

          [facility] CARE           [id number]           SP                  82

[id number]

 

          [facility] HEALTH CARE           [id number]           SP                  82

[id number]

 

          [employer] [place] [address]           [id number]           SP       

           [id number]

 

          [facility] HEALTH CARE O         [id number]           S                   82

[id number]

 

          [employer]UE [employer]OSS           [id number]           S                   [id number]

[id number]

 

          [employer]         [id number]           S                   [id number]

[id number]

 

          [employer]                     Se

lf                 

 

          [employer] [facility] Health Maintenance Organization ([facility])           

          Self                 

 

          [employer]                     Se

lf                 

 

          [employer]                     Se

lf                 

 

          [employer]         [id number]           S                   [id number]

[id number]

 

          [employer] [place] [address]           [id number]           SP       

           [id number]

 

          [employer]                     Se

lf                 

 

          BS Of [facility] ([facility])           

          Self                 

 

          [employer]                     Se

lf                 

 

          [employer]                     Se

lf                 

 

          [employer]                     Se

lf                 

 

          [employer]                     Se

lf                 

 

          [employer]                     Se

lf                 

 

          [employer]                     Se

lf                 

 

          [employer]                     Se

lf                 

 

          [employer]                     Se

lf                 

 

          [employer]                     Se

lf                 

 

          [employer]                     Se

lf                 

 

                    O         UNAVAILABLE                               UNAVAILA

BLE

 

          [employer]                     Se

lf                 

 

          [employer]                     Se

lf                 

 

          [employer]                     Se

lf                 

 

          [employer] [facility] [employer]                     Self      

           

 

          [employer] Commercial                     Self          

       

 

          [employer] [place] [address]           [id number]           SP       

           [id number]

 

                              [id number]                               [id number]

[id number]



                                                                                
                                                                                
                                                                                
                                                                                
                                                                                
                                                                                
                                                                                
                                                                                
                                                          



Problems, Conditions, and Diagnoses

          



           Code       Display Name Description Problem Type Effective Dates Data

 Source(s)

 

           L57.0      [id number]  Actinic keratoses Problem    2019 12:00:0

0 AM EST eCW1 

([facility])

 

             R91.1        Solitary pulmonary nodule Solitary pulmonary nodule Di

agnosis    2021 

08:51:00 AM EST                         [facility]



                                                                                
                            



Surgeries/Procedures

          



             Procedure    Description  Date         Indications  Data Source(s)

 

             CT GUIDANCE NEEDLE PLACEMENT              2021 12:00:00 AM ES

T              [facility] ([facility], )

 

             DEMO&/EVAL OF PT UTILIZ AERSL GEN/NEB/INHLR/IPPB               12:00:00 AM EST              

[facility] ([facility], )

 

             Spirometry                2020 12:00:00 AM EST              M

[facility] ([facility], 

)



                                                                                
                            



Results

          



                    ID                  Date                Data Source

 

                    [id number]            2021 11:50:00 AM EST [facility] ([facility]









          Name      Value     Range     Interpretation Code Description Data Mary

rce(s) Supporting 

Document(s)

 

                          Bacteria identified in Unspecified specimen by Anaerob

e culture Laboratory test 

result                                              [facility] ([facility]  

 

                                        ****************************************

*********

If anaerobic or aerobic growth is detected within

the next 7-21 days, an addendum will follow.

                                        .***************************************

********.



FULL REPORT IN LAB NOTES ([facility] and [facility]).

NO GROWTH ANAEROBICALLY



 









                    ID                  Date                Data Source

 

                    [id number]            2021 11:50:00 AM EST [facility] ([facility]









          Name      Value     Range     Interpretation Code Description Data Mary

rce(s) Supporting 

Document(s)

 

          Gram Stain Laboratory test result                               [facility]

 ([facility]  

 

                                        MANY WBCS

MANY RBCS

FEW GRAM POSITIVE COCCI IN PAIRS AND CHAINS



 

 

           Body Fluid Culture Laboratory test result                            

      [facility]                                   

 

                                        <content>FULL REPORT IN LAB NOTES (eCW a

nd 

[facility]).</content><br/><content></content><br/><content>ORGANISM 1: STREP 
CONSTELLATUS SPP PHARYNG</content><br/><content></content><br/><content>QUANTITY
 OF GROWTH            
HEAVY</content><br/><content></content><br/><content></content><br/><content>ORG

ANISM 1: STREP CONSTELLATUS SPP 
PHARYNG</content><br/><content></content><br/><content>STREP CONSTELLATUS SPP 
PHARYNG:                    REACTION</content><br/><content>TETRACYCLINE        
               PO         250 mg qid 0.5      S</content><br/><content>
PENICILLIN G                       IV         1 mu  q6H 0.5      
I</content><br/><content>PENICILLIN G                       IV         1 mu  q6h
 0.5      I</content><br/><content>PENICILLIN G                       PO        
 250mg q6h fasting 0.5      I</content><br/><content>AMPICILLIN                 
        IV         500mg q6h <=0.25      S</content><br/><content>AMPICILLIN    
                     PO         500mg q6h fasting <=0.25      
S</content><br/><content>ERYTHROMYCIN                       IV         500mg q6h
 <=0.12      S</content><br/><content>ERYTHROMYCIN                       PO     
    500mg q6h <=0.12      S</content><br/><content>CLINDAMYCIN                  
      IV         600mg q6h 0.5      I</content><br/><content>CLINDAMYCIN        
                PO         150mg q6h 0.5      
I</content><br/><content>LEVOFLOXACIN                       IV         500mg qd 
0.5      S</content><br/><content>LEVOFLOXACIN                       PO         
250mg qd 0.5      S</content><br/><content>LEVOFLOXACIN                       PO
         500mg qd 0.5      S</content><br/><content>VANCOMYCIN                  
       IV         500mg q8h 0.5      S</content><br/><content>MOXIFLOXACIN 
(AVELOX)              IV         400MG QD 0.12      S</content><br/><content>
MOXIFLOXACIN (AVELOX)              PO         400MG QD 0.12      
S</content><br/><content>CEFTRIAXONE                        IV         1gm q24h 
<=0.12      S</content><br/><content>CEFOTAXIME                         IV      
   1gm  q8h <=0.12      S</content><br/><content></content> 









                    ID                  Date                Data Source

 

                    [id number]            2021 11:50:00 AM EST [facility] ([facility]









          Name      Value     Range     Interpretation Code Description Data Mary

rce(s) Supporting 

Document(s)

 

           PH Body Fluid Laboratory test result                                 

 [facility]                                    

 

                                        CLOTTED, UNABLE TO PERFORM TESTING

 

 

           Source, Body Fluid pH Laboratory test result                         

         [facility] ([facility]                              









                    ID                  Date                Data Source

 

                    [id number]            2021 11:50:00 AM EST [facility] ([facility]









          Name      Value     Range     Interpretation Code Description Data Mary

rce(s) Supporting 

Document(s)

 

          Amylase, Body Fluid 42 U/L                                  [facility] ([facility]  

 

           Source, Body Fluid Amylase Laboratory test result                    

              [facility] ([facility]                    









                    ID                  Date                Data Source

 

                    [id number]            2021 11:50:00 AM EST [facility] ([facility]









          Name      Value     Range     Interpretation Code Description Data Mary

rce(s) Supporting 

Document(s)

 

           Source, Body Fluid Glucose Laboratory test result                    

              [facility] ([facility]                    

 

          Glucose, Body Fluid 18 mg/dL                                [facility] ([facility]  









                    ID                  Date                Data Source

 

                    [id number]            2021 11:50:00 AM EST [facility] ([facility]









          Name      Value     Range     Interpretation Code Description Data Mary

rce(s) Supporting 

Document(s)

 

           Source, Body Fluid LDH Laboratory test result                        

          [facility] ([facility]                              

 

          LDH, Body Fluid 85031 U/L                               [facility]  









                    ID                  Date                Data Source

 

                    [id number]            2021 11:50:00 AM EST [facility] ([facility]









          Name      Value     Range     Interpretation Code Description Data [facility] Supporting 

Document(s)

 

           Source, Body Fluid Tot Protein Laboratory test result                

                  [facility] ([facility]                    

 

          Total Protein, Body Fluid 5.2 g/dL                                [facility]

[facility] ([facility] 

 









                    ID                  Date                Data Source

 

                    [id number]            2021 11:50:00 AM EST [facility] ([facility]









          Name      Value     Range     Interpretation Code Description Data [facility] Supporting 

Document(s)

 

           Source, Body Fluid Laboratory test result                            

      [facility]                                   

 

           Appearance, Body Fluid Laboratory test result                        

          [facility] ([facility]                              

 

           Pleural FL Color Laboratory test result                              

    [facility]                                    

 

           WBC Body Fluid Laboratory test result 0-10                           

  [facility]                                    

 

                                        CLOTTED, UNABLE TO PERFORM TESTING

 

 

           RBC Body Fluid Laboratory test result                                

  [facility]                                    

 

                                        CLOTTED, UNABLE TO PERFORM TESTING

 









                    ID                  Date                Data Source

 

                    [id number]            2021 11:50:00 AM EST [facility] ([facility]









          Name      Value     Range     Interpretation Code Description Data [facility] Supporting 

Document(s)

 

           Amylase [Enzymatic activity/volume] in Body fluid Laboratory test res

ult                                  

[facility] ([facility]   

 

           Laboratory test finding (navigational concept) Laboratory test result

                                  

[facility] ([facility]   

 

                                        MANY WBCS

MANY RBCS

FEW GRAM POSITIVE COCCI IN PAIRS AND CHAINS



 

 

                          Bacteria identified in Unspecified specimen by Anaerob

e culture Laboratory test 

result                                              [facility] ([facility]  

 

                                        Comments: RIGHT PLEURAL EFFUSION

By: PEDRO STUART

Time: 1047

Description if other: RIGHT PLEURAL EFFUSION

By: PEDRO STUART

Time: 1046

 









                    ID                  Date                Data Source

 

                    [id number]            2021 11:10:00 AM EST [facility] ([facility]









          Name      Value     Range     Interpretation Code Description Data Mary

rce(s) Supporting 

Document(s)

 

                                        Lactate dehydrogenase [Enzymatic activit

y/volume] in Serum or Plasma by Lactate 

to pyruvate reaction 120 U/L                               [facility]  

 

                                        By: PARKER GALE

Time: 1037

By: PARKER GALE

Time: 1038

By: PARKER GALE Time: 1037 By: PARKER GALE Time: 1038

 









                    ID                  Date                Data Source

 

                    [id number]            2021 11:10:00 AM EST [facility] ([facility]









          Name      Value     Range     Interpretation Code Description Data Mary

rce(s) Supporting 

Document(s)

 

          Blood Urea Nitrogen 21 mg/dL  7-18                          [facility] ([facility]  

 

          Glucose, Fasting 96 mg/dL                          [facility]  

 

           Creatinine For GFR 0.95 mg/dL 0.70-1.30                        [facility]

 ([facility]                                    

 

           Glomerular Filtration Rate Laboratory test result                    

              [facility] ([facility]                    

 

                                        <content>Units are mL/min/1.73 

m2</content><br/><content></content><br/><content>Chronic Kidney Disease Staging
per NKF:</content><br/><content></content><br/><content>Stage I & II   GFR >=60 
     Normal to Mildly Decreased</content><br/><content>Stage III      GFR 30-59 
    Moderately Decreased</content><br/><content>Stage IV       GFR 15-29      
Severely Decreased</content><br/><content>Stage V        GFR <15        Very 
Little GFR Left</content><br/><content>ESRD           GFR <15 on 
RRT</content><br/><content></content> 

 

          Chloride Level 110 meq/L                         [facility]  

 

          Sodium Level 142 meq/L 136-145                       [facility]  

 

          Potassium Serum 4.0 meq/L 3.5-5.1                       [facility]  

 

          Anion Gap 8 meq/L   8-16                          [facility]  

 

          Calcium Level 10.0 mg/dL 8.8-10.2                      [facility]  

 

          Carbon Dioxide Level 24 meq/L  21-32                         [facility] ([facility]  

 

          Ast/Sgot  7 U/L     7-37                          [facility]  

 

          Alt/SGPT  19 U/L    12-78                         [facility]  

 

          Bilirubin,Total 0.8 mg/dL 0.2-1.0                       [facility]  

 

          Alkaline Phosphatase 78 U/L                            [facility]  

 

          Albumin/Globulin Ratio 0.9                                     [facility] 

([facility]  

 

          Albumin   3.5 GM/DL 3.2-5.2                       [facility]  

 

          Total Protein 7.6 GM/DL 6.4-8.2                       [facility]  









                    ID                  Date                Data Source

 

                    [id number]            2021 11:10:00 AM EST [facility] ([facility]









          Name      Value     Range     Interpretation Code Description Data Mary

rce(s) Supporting 

Document(s)

 

          White Blood Count 10.2 10   4.0-10.0                      [facility] ([facility]  

 

                                        A Pathologist review of this differentia

l can help in the

evaluation of a differential diagnosis.  Please order a

Pathologist Review (PERISM) if deemed necessary.  Results

are subject to change if a Pathologist Review is performed.

 

 

          Red Blood Count 4.78 10   4.30-6.10                     [facility]  

 

          Hemoglobin 15.4 g/dL 13.5-17.5                     [facility]  

 

           Mean Corpuscular Volume 97.1 fl    80.0-96.0                        M

[facility]                                    

 

          Hematocrit 46.4 %    42.0-52.0                     [facility]  

 

           Mean Corpuscular Hemoglobin 32.2 pg    27.0-33.0                     

   [facility]                              

 

           Red Cell Distribution Width 13.0 %     11.5-14.5                     

   [facility]                              

 

           Mean Corpuscular HGB Conc 33.2 g/dL  32.0-36.5                       

 [facility]                              

 

           Platelet Count, Automated 237 10     150-450                         

 [facility]                                    

 

          Lymph %   10.6 %    24.0-44.0                     [facility]  

 

          Neutrophils % 75.3 %    36.0-66.0                     [facility]  

 

          Mono %    12.9 %    0.0-5.0                       [facility]  

 

          Eos %     0.3 %     0.0-3.0                       [facility]  

 

          Immature Granulocyte % 0.3 %     0-3.0                         [facility] 

([facility]  

 

          Baso %    0.6 %     0.0-1.0                       [facility]  

 

          Neutrophils # 7.6 10    1.5-8.5                       [facility]  

 

          Nucleated Red Blood Cell % 0.0 %     0-0                           [facility]

[facility] ([facility] 



 

          Lymph #   1.1 10    1.5-5.0                       [facility]  

 

          Eos #     0.0 10    0.0-0.5                       [facility]  

 

          Mono #    1.3 10    0.0-0.8                       [facility]  

 

          Baso #    0.1 10    0.0-0.2                       [facility]  









                    ID                  Date                Data Source

 

                    [id number]         2021 11:10:00 AM EST [facility] ([facility])









          Name      Value     Range     Interpretation Code Description Data Mary

rce(s) Supporting 

Document(s)

 

                    Lactate dehydrogenase [Enzymatic activity/volume] in Serum o

r Plasma 120 U/L             

              Normal (applies to non-numeric results)                     

[facility] ([facility])                            

 

                                        By: PARKER GALE

Time: 1037

By: PARKER GALE

Time: 1038

By: PARKER GALE Time: 1037 By: PARKER GALE Time: 1038

 









                    ID                  Date                Data Source

 

                    [id number]         2021 11:10:00 AM EST [facility] ([facility])









          Name      Value     Range     Interpretation Code Description Data Mary

rce(s) Supporting 

Document(s)

 

           Glucose, Fasting 96 mg/dL        Normal (applies to non-numeric

 results)            

[facility] ([facility])  

 

           Blood Urea Nitrogen 21 mg/dL   7-18       Above high normal          

  [facility] ([facility], )                            

 

                Glomerular Filtration Rate Laboratory test result               

  Normal (applies to non-

numeric results)                        [facility]) 

 

 

                                        <content>Units are mL/min/1.73 

m2</content><br/><content></content><br/><content>Chronic Kidney Disease Staging
per NKF:</content><br/><content></content><br/><content>Stage I & II   GFR >=60 
     Normal to Mildly Decreased</content><br/><content>Stage III      GFR 30-59 
    Moderately Decreased</content><br/><content>Stage IV       GFR 15-29      
Severely Decreased</content><br/><content>Stage V        GFR <15        Very 
Little GFR Left</content><br/><content>ESRD           GFR <15 on 
RRT</content><br/><content></content> 

 

             Creatinine For GFR 0.95 mg/dL   0.70-1.30    Normal (applies to non

-numeric results) 

                          [facility] ([facility], )  

 

           Sodium Level 142 meq/L  136-145    Normal (applies to non-numeric res

ults)            [facility] 

([facility])         

 

           Chloride Level 110 meq/L       Above high normal            [facility]

[facility] ([facility], )                            

 

           Potassium Serum 4.0 meq/L  3.5-5.1    Normal (applies to non-numeric 

results)            

[facility] ([facility])  

 

           Calcium Level 10.0 mg/dL 8.8-10.2   Normal (applies to non-numeric re

sults)            

[facility] ([facility])  

 

           Anion Gap  8 meq/L    8-16       Normal (applies to non-numeric resul

ts)            [facility] 

([facility])         

 

           Carbon Dioxide Level 24 meq/L   21-32      Normal (applies to non-num

mynor results)            

[facility] ([facility])  

 

           Ast/Sgot   7 U/L      7-37       Normal (applies to non-numeric resul

ts)            [facility])                    

 

           Alkaline Phosphatase 78 U/L          Normal (applies to non-num

mynor results)            

[facility] ([facility])  

 

           Alt/SGPT   19 U/L     12-78      Normal (applies to non-numeric resul

ts)            [facility] 

([facility], )         

 

           Bilirubin,Total 0.8 mg/dL  0.2-1.0    Normal (applies to non-numeric 

results)            

[facility])  

 

           Total Protein 7.6 GM/DL  6.4-8.2    Normal (applies to non-numeric re

sults)            [facility])         

 

           Albumin/Globulin Ratio 0.9                   Normal (applies to non-n

umeric results)            [facility])         

 

           Albumin    3.5 GM/DL  3.2-5.2    Normal (applies to non-numeric resul

ts)            [facility])         









                    ID                  Date                Data Source

 

                    [id number]         2021 11:10:00 AM EST [facility] ([facility])









          Name      Value     Range     Interpretation Code Description Data Mary

rce(s) Supporting 

Document(s)

 

           Red Blood Count 4.78 10    4.30-6.10  Normal (applies to non-numeric 

results)            

[facility] ([facility], )  

 

           White Blood Count 10.2 10    4.0-10.0   Above high normal            

[facility] ([facility])                            

 

                                        A Pathologist review of this differentia

l can help in the

evaluation of a differential diagnosis.  Please order a

Pathologist Review (PERISM) if deemed necessary.  Results

are subject to change if a Pathologist Review is performed.

 

 

           Hemoglobin 15.4 g/dL  13.5-17.5  Normal (applies to non-numeric resul

ts)            [facility] 

([facility], )         

 

           Hematocrit 46.4 %     42.0-52.0  Normal (applies to non-numeric resul

ts)            [facility])         

 

           Mean Corpuscular Volume 97.1 fl    80.0-96.0  Above high normal      

      [facility])                    

 

                Mean Corpuscular HGB Conc 33.2 g/dL       32.0-36.5       Normal

 (applies to non-numeric 

results)                                [facility]) 

 

 

                Mean Corpuscular Hemoglobin 32.2 pg         27.0-33.0       Norm

al (applies to non-numeric 

results)                                [facility]) 

 

 

                Red Cell Distribution Width 13.0 %          11.5-14.5       Norm

al (applies to non-numeric 

results)                                [facility] ([facility]) 

 

 

                Platelet Count, Automated 237 10          150-450         Normal

 (applies to non-numeric results)

                                        [facility] ([facility]) 

 

 

           Neutrophils % 75.3 %     36.0-66.0  Above high normal            [facility]

[facility] ([facility])                            

 

           Lymph %    10.6 %     24.0-44.0  Below low normal            [facility] (

[facility])                                      

 

           Mono %     12.9 %     0.0-5.0    Above high normal            [facility] 

([facility])                                      

 

           Eos %      0.3 %      0.0-3.0    Normal (applies to non-numeric resul

ts)            [facility] ([facility])                    

 

           Baso %     0.6 %      0.0-1.0    Normal (applies to non-numeric resul

ts)            [facility] ([facility])                    

 

           Immature Granulocyte % 0.3 %      0-3.0      Normal (applies to non-n

umeric results)            

[facility] ([facility])  

 

           Nucleated Red Blood Cell % 0.0 %      0-0        Normal (applies to n

on-numeric results)            

[facility] ([facility])  

 

           Neutrophils # 7.6 10     1.5-8.5    Normal (applies to non-numeric re

sults)            [facility] 

([facility])         

 

           Lymph #    1.1 10     1.5-5.0    Below low normal            [facility] (

[facility])                                      

 

           Eos #      0.0 10     0.0-0.5    Normal (applies to non-numeric resul

ts)            [facility] ([facility])                    

 

           Mono #     1.3 10     0.0-0.8    Above high normal            [facility] 

([facility])                                      

 

           Baso #     0.1 10     0.0-0.2    Normal (applies to non-numeric resul

ts)            [facility] 

([facility])         









                    ID                  Date                Data Source

 

                    [id number]         2021 09:29:00 AM EST [facility] ([facility])









          Name      Value     Range     Interpretation Code Description Data Mary

rce(s) Supporting 

Document(s)

 

           Prothrombin Time 13.1 s     12.5-14.3  Normal (applies to non-numeric

 results)            

[facility] ([facility])  

 

                Partial Thromboplastin Time 34.6 s          24.2-38.5       Norm

al (applies to non-numeric 

results)                                [facility] ([facility]) 

 

 

           Inr        0.97                  Normal (applies to non-numeric resul

ts)            [facility] ([facility])                            

 

                                        THERAPUTIC HUMAN INR VALUES

INDICATIONS                      NORMAL RANGES

PROPHYLAXIS/TREATMENT OF:

VENOUS THROMBOSIS                2.0-3.0

PULMONARY EMBOLISM               2.0-3.0

PREVENTION OF SYSTEMIC EMBOLISM FROM:

TISSUE HEART VALVES              2.0-3.0

ACUTE MYOCARDIAL INFARCTION      2.0-3.0

VALVULAR HEART DISEASE           2.0-3.0

ATRIAL FIBRILLATION              2.0-3.0

MECHANICAL VALVES(HIGH RISK)     2.5-3.5

RECURRENT MYOCARDIAL INFARCTION  2.5-3.5

 









                    ID                  Date                Data Source

 

                    [id number]         2021 09:29:00 AM EST [facility] ([facility])









          Name      Value     Range     Interpretation Code Description Data Mary

rce(s) Supporting 

Document(s)

 

           aPTT in Platelet poor plasma by Coagulation assay Laboratory test res

ult                                  

[facility] ([facility])  

 

                Platelets [#/volume] in Blood by Automated count 312 10         

 150-450         Normal (applies 

to non-numeric results)                     [facility] ([facility])  









                    ID                  Date                Data Source

 

                    [id number]            2021 10:35:00 AM EST [facility] ([facility]









          Name      Value     Range     Interpretation Code Description Data Mary

rce(s) Supporting 

Document(s)

 

          Red Blood Count 4.60 10   4.30-6.10                     [facility] ([facility]  

 

          Hemoglobin 14.9 g/dL 13.5-17.5                     [facility] ([facility]  

 

          White Blood Count 10.2 10   4.0-10.0                      [facility] ([facility]  

 

           Mean Corpuscular Volume 94.1 fl    80.0-96.0                        [facility]

[facility] ([facility]                                    

 

          Hematocrit 43.3 %    42.0-52.0                     [facility]  

 

           Mean Corpuscular Hemoglobin 32.4 pg    27.0-33.0                     

   [facility] ([facility]                              

 

           Red Cell Distribution Width 12.7 %     11.5-14.5                     

   [facility] ([facility]                              

 

           Platelet Count, Automated 285 10     150-450                         

 [facility]                                    

 

           Mean Corpuscular HGB Conc 34.4 g/dL  32.0-36.5                       

 [facility] ([facility]                              

 

          Lymph %   8.1 %     24.0-44.0                     [facility]  

 

          Neutrophils % 80.3 %    36.0-66.0                     [facility] ([facility]  

 

          Mono %    9.7 %     0.0-5.0                       [facility]  

 

          Eos %     0.5 %     0.0-3.0                       [facility]  

 

          Baso %    0.7 %     0.0-1.0                       [facility]  

 

          Immature Granulocyte % 0.7 %     0-3.0                         [facility] 

([facility]  

 

          Neutrophils # 8.2 10    1.5-8.5                       [facility] ([facility]  

 

          Nucleated Red Blood Cell % 0.0 %     0-0                           [facility]

[facility] ([facility] 

 

 

          Lymph #   0.8 10    1.5-5.0                       [facility]  

 

          Mono #    1.0 10    0.0-0.8                       [facility]  

 

          Eos #     0.1 10    0.0-0.5                       [facility]  

 

          Baso #    0.1 10    0.0-0.2                       [facility]  









                    ID                  Date                Data Source

 

                    [id number]            2021 10:35:00 AM EST [facility] ([facility]









          Name      Value     Range     Interpretation Code Description Data Mary

rce(s) Supporting 

Document(s)

 

           Creatinine For GFR 0.85 mg/dL 0.70-1.30                        [facility]

 ([facility]                                    

 

          Blood Urea Nitrogen 17 mg/dL  7-18                          [facility] ([facility]  

 

          Glucose, Fasting 103 mg/dL                         [facility] ([facility]  

 

          Sodium Level 139 meq/L 136-145                       [facility] ([facility]  

 

           Glomerular Filtration Rate Laboratory test result                    

              [facility] ([facility]                    

 

                                        <content>Units are mL/min/1.73 

m2</content><br/><content></content><br/><content>Chronic Kidney Disease Staging
 per NKF:</content><br/><content></content><br/><content>Stage I & II   GFR >=60
       Normal to Mildly Decreased</content><br/><content>Stage III      GFR 30-
59      Moderately Decreased</content><br/><content>Stage IV       GFR 15-29    
  Severely Decreased</content><br/><content>Stage V        GFR <15        Very 
Little GFR Left</content><br/><content>ESRD           GFR <15 on 
RRT</content><br/><content></content> 

 

          Carbon Dioxide Level 23 meq/L  21-32                         [facility] ([facility]  

 

          Anion Gap 7 meq/L   8-16                          [facility]  

 

          Chloride Level 109 meq/L                         [facility] ([facility]  

 

          Potassium Serum 4.1 meq/L 3.5-5.1                       [facility] ([facility]  

 

          Calcium Level 9.7 mg/dL 8.8-10.2                      [facility] ([facility]  

 

          Ast/Sgot  11 U/L    7-37                          [facility]  

 

          Alkaline Phosphatase 78 U/L                            [facility] ([facility]  

 

          Bilirubin,Total 0.6 mg/dL 0.2-1.0                       [facility] ([facility]  

 

          Alt/SGPT  31 U/L    12-78                         [facility]  

 

          Total Protein 7.3 GM/DL 6.4-8.2                       [facility] ([facility]  

 

          Albumin/Globulin Ratio 0.7                                     [facility] 

([facility]  

 

          Albumin   3.1 GM/DL 3.2-5.2                       [facility]  









                    ID                  Date                Data Source

 

                    [id number]            2021 10:35:00 AM EST [facility] ([facility]









          Name      Value     Range     Interpretation Code Description Data Mary

rce(s) Supporting 

Document(s)

 

                                        Lactate dehydrogenase [Enzymatic activit

y/volume] in Serum or Plasma by Lactate 

to pyruvate reaction 107 U/L                               [facility] ([facility]  

 

                                        By: FRANCI MOTTA

Time: 0854

By: FRANCI MOTTA Time: 08

 









                    ID                  Date                Data Source

 

                    [id number]         2021 10:35:00 AM EST [facility] ([facility], )









          Name      Value     Range     Interpretation Code Description Data Mary

rce(s) Supporting 

Document(s)

 

                    Lactate dehydrogenase [Enzymatic activity/volume] in Serum o

r Plasma 107 U/L             

              Normal (applies to non-numeric results)                     

[facility])                            

 

                                        By: FRANCI MOTTA

Time: 854

By: FRANCI MOTTA Time: 854

 









                    ID                  Date                Data Source

 

                    [id number]         2021 10:35:00 AM EST [facility] ([facility]









          Name      Value     Range     Interpretation Code Description Data Mary

rce(s) Supporting 

Document(s)

 

           Glucose, Fasting 103 mg/dL       Above high normal            M

[facility]                            

 

           Blood Urea Nitrogen 17 mg/dL   7-18       Normal (applies to non-nume

lyndsey results)            

[facility] ([facility])  

 

           Sodium Level 139 meq/L  136-145    Normal (applies to non-numeric res

ults)            [facility])         

 

             Creatinine For GFR 0.85 mg/dL   0.70-1.30    Normal (applies to non

-numeric results) 

                          [facility] ([facility])  

 

                Glomerular Filtration Rate Laboratory test result               

  Normal (applies to non-

numeric results)                        [facility]) 

 

 

                                        <content>Units are mL/min/1.73 

m2</content><br/><content></content><br/><content>Chronic Kidney Disease Staging
per NKF:</content><br/><content></content><br/><content>Stage I & II   GFR >=60 
     Normal to Mildly Decreased</content><br/><content>Stage III      GFR 30-59 
    Moderately Decreased</content><br/><content>Stage IV       GFR 15-29      
Severely Decreased</content><br/><content>Stage V        GFR <15        Very 
Little GFR Left</content><br/><content>ESRD           GFR <15 on 
RRT</content><br/><content></content> 

 

           Potassium Serum 4.1 meq/L  3.5-5.1    Normal (applies to non-numeric 

results)            

[facility] ([facility])  

 

           Chloride Level 109 meq/L       Above high normal            [facility]

[facility] ([facility])                            

 

           Carbon Dioxide Level 23 meq/L   21-32      Normal (applies to non-num

mynor results)            

[facility] ([facility])  

 

           Calcium Level 9.7 mg/dL  8.8-10.2   Normal (applies to non-numeric re

sults)            

[facility] ([facility])  

 

           Anion Gap  7 meq/L    8-16       Below low normal            [facility] (

[facility])                                      

 

           Alt/SGPT   31 U/L     12-78      Normal (applies to non-numeric resul

ts)            [facility]         

 

           Ast/Sgot   11 U/L     7-37       Normal (applies to non-numeric resul

ts)            [facility] ([facility])                    

 

           Alkaline Phosphatase 78 U/L          Normal (applies to non-num

mynor results)            

[facility] ([facility])  

 

           Bilirubin,Total 0.6 mg/dL  0.2-1.0    Normal (applies to non-numeric 

results)            

[facility])  

 

           Total Protein 7.3 GM/DL  6.4-8.2    Normal (applies to non-numeric re

sults)            [facility]

([facility])         

 

           Albumin    3.1 GM/DL  3.2-5.2    Below low normal            [facility] (

[facility])                                      

 

           Albumin/Globulin Ratio 0.7                   Normal (applies to non-n

umeric results)            [facility])         









                    ID                  Date                Data Source

 

                    [id number]         2021 10:35:00 AM EST [facility] ([facility]









          Name      Value     Range     Interpretation Code Description Data Mary

rce(s) Supporting 

Document(s)

 

           White Blood Count 10.2 10    4.0-10.0   Above high normal            

[facility] ([facility])                            

 

           Hemoglobin 14.9 g/dL  13.5-17.5  Normal (applies to non-numeric resul

ts)            [facility] 

([facility])         

 

           Hematocrit 43.3 %     42.0-52.0  Normal (applies to non-numeric resul

ts)            [facility])         

 

           Red Blood Count 4.60 10    4.30-6.10  Normal (applies to non-numeric 

results)            

[facility])  

 

                Mean Corpuscular Volume 94.1 fl         80.0-96.0       Normal (

applies to non-numeric 

results)                                [facility]) 

 

 

                Mean Corpuscular Hemoglobin 32.4 pg         27.0-33.0       Norm

al (applies to non-numeric 

results)                                [facility] ([facility]) 

 

 

                Red Cell Distribution Width 12.7 %          11.5-14.5       Norm

al (applies to non-numeric 

results)                                [facility] ([facility]) 

 

 

                Mean Corpuscular HGB Conc 34.4 g/dL       32.0-36.5       Normal

 (applies to non-numeric 

results)                                [facility] ([facility]) 

 

 

                Platelet Count, Automated 285 10          150-450         Normal

 (applies to non-numeric results)

                                        [facility] ([facility]) 

 

 

           Lymph %    8.1 %      24.0-44.0  Below low normal            [facility] (

[facility])                                      

 

           Neutrophils % 80.3 %     36.0-66.0  Above high normal            [facility]

[facility] ([facility])                            

 

           Eos %      0.5 %      0.0-3.0    Normal (applies to non-numeric resul

ts)            [facility] ([facility])                    

 

           Mono %     9.7 %      0.0-5.0    Above high normal            [facility] 

([facility])

                                         

 

           Baso %     0.7 %      0.0-1.0    Normal (applies to non-numeric resul

ts)            [facility]                    

 

           Immature Granulocyte % 0.7 %      0-3.0      Normal (applies to non-n

umeric results)            

[facility] ([facility])  

 

           Lymph #    0.8 10     1.5-5.0    Below low normal            [facility] (

[facility])                                      

 

           Nucleated Red Blood Cell % 0.0 %      0-0        Normal (applies to n

on-numeric results)            

[facility]  

 

           Neutrophils # 8.2 10     1.5-8.5    Normal (applies to non-numeric re

sults)            [facility] 

([facility])         

 

           Baso #     0.1 10     0.0-0.2    Normal (applies to non-numeric resul

ts)            [facility] 

[facility])         

 

           Eos #      0.1 10     0.0-0.5    Normal (applies to non-numeric resul

ts)            [facility])                    

 

           Mono #     1.0 10     0.0-0.8    Above high normal            [facility] 

([facility])                                      









                    ID                  Date                Data Source

 

                    [id number]         2020 11:25:00 AM EST [facility]









          Name      Value     Range     Interpretation Code Description Data Mary

rce(s) Supporting 

Document(s)

 

           Prothrombin Time 12.9 s     12.5-14.3  Normal (applies to non-numeric

 results)            

[facility] ([facility])  

 

           Inr        0.95                  Normal (applies to non-numeric resul

ts)            [facility] ([facility])                            

 

                                        THERAPUTIC HUMAN INR VALUES

INDICATIONS                      NORMAL RANGES

PROPHYLAXIS/TREATMENT OF:

VENOUS THROMBOSIS                2.0-3.0

PULMONARY EMBOLISM               2.0-3.0

PREVENTION OF SYSTEMIC EMBOLISM FROM:

TISSUE HEART VALVES              2.0-3.0

ACUTE MYOCARDIAL INFARCTION      2.0-3.0

VALVULAR HEART DISEASE           2.0-3.0

ATRIAL FIBRILLATION              2.0-3.0

MECHANICAL VALVES(HIGH RISK)     2.5-3.5

RECURRENT MYOCARDIAL INFARCTION  2.5-3.5

 

 

                Partial Thromboplastin Time 32.8 s          24.2-38.5       Norm

al (applies to non-numeric 

results)                                [facility] ([facility]) 

 









                    ID                  Date                Data Source

 

                    [id number]         2020 11:25:00 AM EST [facility]









          Name      Value     Range     Interpretation Code Description Data Mary

rce(s) Supporting 

Document(s)

 

                Platelets [#/volume] in Blood by Automated count 342 10         

 150-450         Normal (applies 

to non-numeric results)                     [facility] ([facility])  

 

           aPTT in Platelet poor plasma by Coagulation assay Laboratory test res

ult                                  

[facility])  









                    ID                  Date                Data Source

 

                    [id number]            10/16/2020 12:19:00 PM [facility]T [facility]









          Name      Value     Range     Interpretation Code Description Data Mary

rce(s) Supporting 

Document(s)

 

           Laboratory test finding (navigational concept) 154 mg/dL       

                      [facility] 

([facility]          

 

           Laboratory test finding (navigational concept) 140 meq/L  136-145    

                      [facility] 

([facility]          

 

           Laboratory test finding (navigational concept) 65.0 %     38.0-51.0  

                      [facility] 

([facility]          

 

           Laboratory test finding (navigational concept) 104 meq/L       

                      [facility] 

([facility]          

 

           Laboratory test finding (navigational concept) 3.4 meq/L  3.5-5.1    

                      [facility] 

([facility]          

 

           Laboratory test finding (navigational concept) 5.0 mg/dL  4.5-5.3    

                      [facility] 

([facility]          

 

           Laboratory test finding (navigational concept) 19.0 MM/L  23.0-27.0  

                      [facility] 

([facility]          

 

           Laboratory test finding (navigational concept) 0.6 mg/dL  0.6-1.3    

                      [facility] 

([facility]          

 

           Laboratory test finding (navigational concept) 24 mg/dL   8-26       

                      [facility] ([facility]                 









                    ID                  Date                Data Source

 

                    [id number]            10/16/2020 12:05:00 PM EDT [facility] ([facility]









          Name      Value     Range     Interpretation Code Description Data Mary

rce(s) Supporting 

Document(s)

 

           Troponin I.cardiac [Mass/volume] in Serum or Plasma 0.00 ng/mL 0.00-0

.08                        

[facility] ([facility]   









                    ID                  Date                Data Source

 

                    [id number]            10/16/2020 11:55:00 AM EDT [facility] ([facility]









          Name      Value     Range     Interpretation Code Description Data Mary

rce(s) Supporting 

Document(s)

 

           Laboratory test finding (navigational concept) 1.04       0.4-2.0    

                      [facility] ([facility]                 









                    ID                  Date                Data Source

 

                    [id number]            10/16/2020 11:52:00 AM EDT [facility] ([facility]









          Name      Value     Range     Interpretation Code Description Data Mary

rce(s) Supporting 

Document(s)

 

           Thyrotropin [Units/volume] in Serum or Plasma 0.480 uIU/ML 0.358-3.74

0                       

[facility] ([facility]   

 

                          Natriuretic peptide.B prohormone N-Terminal [Mass/volu

me] in Serum or Plasma 612

 pg/mL                                              [facility] ([facility]  









                    ID                  Date                Data Source

 

                    [id number]            10/16/2020 11:52:00 AM EDT [facility] ([facility]









          Name      Value     Range     Interpretation Code Description Data Mary

rce(s) Supporting 

Document(s)

 

          Ast/Sgot  24 U/L    7-37                          [facility]  

 

          Bilirubin,Total 1.5 mg/dL 0.2-1.0                       [facility] ([facility]  

 

          Alkaline Phosphatase 105 U/L                           [facility] ([facility]  

 

          Alt/SGPT  35 U/L    12-78                         [facility]  

 

          Total Protein 6.0 GM/DL 6.4-8.2                       [facility] ([facility]  

 

          Bilirubin,Direct 0.9 mg/dL 0.0-0.2                       [facility] ([facility]  

 

          Albumin   2.3 GM/DL 3.2-5.2                       [facility]  

 

          Albumin/Globulin Ratio 0.6                                     [facility] 

([facility]  









                    ID                  Date                Data Source

 

                    [id number]            10/16/2020 11:52:00 AM EDT [facility] ([facility]









          Name      Value     Range     Interpretation Code Description Data Mary

rce(s) Supporting 

Document(s)

 

          Inr       1.13                                    [facility]  

 

                                        THERAPUTIC HUMAN INR VALUES

INDICATIONS                      NORMAL RANGES

PROPHYLAXIS/TREATMENT OF:

VENOUS THROMBOSIS                2.0-3.0

PULMONARY EMBOLISM               2.0-3.0

PREVENTION OF SYSTEMIC EMBOLISM FROM:

TISSUE HEART VALVES              2.0-3.0

ACUTE MYOCARDIAL INFARCTION      2.0-3.0

VALVULAR HEART DISEASE           2.0-3.0

ATRIAL FIBRILLATION              2.0-3.0

MECHANICAL VALVES(HIGH RISK)     2.5-3.5

RECURRENT MYOCARDIAL INFARCTION  2.5-3.5

 

 

          Prothrombin Time 14.7 s    12.5-14.3                     [facility] ([facility]  









                    ID                  Date                Data Source

 

                    [id number]            10/16/2020 11:52:00 AM EDT [facility] ([facility]









          Name      Value     Range     Interpretation Code Description Data Mary

rce(s) Supporting 

Document(s)

 

          White Blood Count 13.3 10   4.0-10.0                      [facility] ([facility]  

 

          Red Blood Count 4.01 10   4.30-6.10                     [facility] ([facility]  

 

          Hematocrit 37.4 %    42.0-52.0                     [facility]  

 

           Mean Corpuscular Hemoglobin 32.7 pg    27.0-33.0                     

   [facility] ([facility]                              

 

           Mean Corpuscular Volume 93.3 fl    80.0-96.0                        M

[facility] ([facility]                                    

 

          Hemoglobin 13.1 g/dL 13.5-17.5                     [facility]  

 

           Mean Corpuscular HGB Conc 35.0 g/dL  32.0-36.5                       

 [facility]                              

 

           Red Cell Distribution Width 12.4 %     11.5-14.5                     

   [facility] ([facility]                              

 

          Lymph %   2.6 %     24.0-44.0                     [facility]  

 

           Platelet Count, Automated 344 10     150-450                         

 [facility]                                    

 

          Neutrophils % 86.7 %    36.0-66.0                     [facility] ([facility]  

 

          Mono %    9.4 %     0.0-5.0                       [facility]  

 

          Immature Granulocyte % 0.7 %     0-3.0                         [facility] 

([facility]  

 

          Eos %     0.2 %     0.0-3.0                       [facility]  

 

          Baso %    0.4 %     0.0-1.0                       [facility]  

 

          Nucleated Red Blood Cell % 0.0 %     0-0                           [facility] ([facility] 

 

 

          Neutrophils # 11.5 10   1.5-8.5                       [facility] ([facility]  

 

          Lymph #   0.3 10    1.5-5.0                       [facility]  

 

          Mono #    1.2 10    0.0-0.8                       [facility]  

 

          Baso #    0.1 10    0.0-0.2                       [facility]  

 

          Eos #     0.0 10    0.0-0.5                       [facility]  









                    ID                  Date                Data Source

 

                    [id number]            10/16/2020 11:52:00 AM EDT [facility] ([facility]









          Name      Value     Range     Interpretation Code Description Data Mary

rce(s) Supporting 

Document(s)

 

           Venous Partial Pressure Co2 28.1 mmHg  38.0-50.0                     

   [facility] ([facility]                              

 

          Venous PH 7.476 units 7.330-7.430                     [facility] ([facility]  

 

          Venous Hco3 20.3 meq/L 23.0-27.0                     [facility] ([facility]  

 

           Venous Total Co2 21.1 meq/L 24.0-28.0                        [facility] (

[facility]

                                         

 

           Venous Partial Pressure O2 140.0 mmHg 30.0-50.0                      

  [facility] ([facility]                              

 

           Venous O2 Saturation 98.9 %     60.0-80.0                        [facility]

[facility] ([facility]

                                         

 

          Venous Standard Hco3 22.9 meq/L                               [facility] (

[facility]  

 

          Venous Base Excess -1.9                                    [facility] ([facility]  









                    ID                  Date                Data Source

 

                    [id number]            10/16/2020 11:37:00 AM EDT [facility] ([facility]









          Name      Value     Range     Interpretation Code Description Data Mary

rce(s) Supporting 

Document(s)

 

          Venous PH 7.476 units 7.330-7.430                     [facility]  

 

           Venous Partial Pressure Co2 28.1 mmHg  38.0-50.0                     

   [facility]                              

 

           Venous Partial Pressure O2 140.0 mmHg 30.0-50.0                      

  [facility] ([facility]                              

 

           Venous Total Co2 21.1 meq/L 24.0-28.0                        [facility] (

[facility]

                                         

 

          Venous Base Excess -1.9                                    [facility] ([facility]  

 

          Venous Standard Hco3 22.9 meq/L                               [facility] (

[facility]  

 

          Venous Hco3 20.3 meq/L 23.0-27.0                     [facility] ([facility]  

 

           Venous O2 Saturation 98.9 %     60.0-80.0                        [facility]

[facility] ([facility]

                                         









                    ID                  Date                Data Source

 

                    [id number]            2020 08:51:00 AM EDT [facility] ([facility]









          Name      Value     Range     Interpretation Code Description Data Mary

rce(s) Supporting 

Document(s)

 

          HDL Cholesterol 44 mg/dL                                [facility] ([facility]  

 

          Cholesterol Level 180 mg/dL                               [facility] ([facility]  

 

          Triglycerides Level 73 mg/dL                                [facility] ([facility]  

 

          Non-HDL-C 136 mg/dL                               [facility]  

 

          LDL Cholesterol 121 mg/dL                               [facility]  

 

          Cholesterol Risk Ratio 4.090                                   [facility] 

([facility]  









                    ID                  Date                Data Source

 

                    [id number]            2020 08:51:00 AM EDT [facility] ([facility]









          Name      Value     Range     Interpretation Code Description Data Mary

e(s) Supporting 

Document(s)

 

          Glucose, Fasting 101 mg/dL                         [facility] ([facility]  

 

           Creatinine For GFR 1.04 mg/dL 0.70-1.30                        [facility]

 ([facility]                                    

 

          Blood Urea Nitrogen 19 mg/dL  7-18                          [facility] ([facility]  

 

           Glomerular Filtration Rate Laboratory test result                    

              [facility] ([facility]                    

 

                                        <content>Units are mL/min/1.73 

m2</content><br/><content></content><br/><content>Chronic Kidney Disease Staging
 per NK[facility]:</content><br/><content></content><br/><content>Stage I & II   GFR >=60
       Normal to Mildly Decreased</content><br/><content>Stage III      GFR 30-
59      Moderately Decreased</content><br/><content>Stage IV       GFR 15-29    
  Severely Decreased</content><br/><content>Stage V        GFR <15        Very 
Little GFR Left</content><br/><content>ESRD           GFR <15 on 
RRT</content><br/><content></content> 

 

          Sodium Level 142 meq/L 136-145                       [facility]  

 

          Carbon Dioxide Level 24 meq/L  21-32                         [facility] ([facility]  

 

          Chloride Level 112 meq/L                         [facility]  

 

          Potassium Serum 4.1 meq/L 3.5-5.1                       [facility] ([facility]  

 

          Ast/Sgot  12 U/L    7-37                          [facility]  

 

          Calcium Level 8.8 mg/dL 8.8-10.2                      [facility]  

 

          Anion Gap 6 meq/L   8-16                          [facility]  

 

          Alkaline Phosphatase 72 U/L                            [facility]  

 

          Alt/SGPT  20 U/L    12-78                         [facility]  

 

          Albumin   3.7 GM/DL 3.2-5.2                       [facility]  

 

          Bilirubin,Total 0.8 mg/dL 0.2-1.0                       [facility]  

 

          Total Protein 6.9 GM/DL 6.4-8.2                       [facility]  

 

          Albumin/Globulin Ratio 1.2                                     [facility] 

([facility]  









                    ID                  Date                Data Source

 

                    [id number]            2020 08:51:00 AM EDT [facility] ([facility]









          Name      Value     Range     Interpretation Code Description Data Mary

rce(s) Supporting 

Document(s)

 

          White Blood Count 7.0 10    4.0-10.0                      [facility]  

 

          Hemoglobin 16.0 g/dL 13.5-17.5                     [facility]  

 

          Red Blood Count 4.71 10   4.30-6.10                     [facility]  

 

          Hematocrit 45.3 %    42.0-52.0                     [facility]  

 

           Mean Corpuscular Hemoglobin 34.0 pg    27.0-33.0                     

   [facility]                              

 

           Mean Corpuscular Volume 96.2 fl    80.0-96.0                        M

[facility]                                    

 

           Red Cell Distribution Width 12.7 %     11.5-14.5                     

   [facility]                              

 

           Mean Corpuscular HGB Conc 35.3 g/dL  32.0-36.5                       

 [facility]                              

 

          Lymph %   18.1 %    24.0-44.0                     [facility]  

 

           Platelet Count, Automated 219 10     150-450                         

 [facility]                                    

 

          Neutrophils % 68.0 %    36.0-66.0                     [facility]  

 

          Baso %    0.9 %     0.0-1.0                       [facility]  

 

          Eos %     1.6 %     0.0-3.0                       [facility]  

 

          Mono %    11.1 %    0.0-5.0                       [facility]  

 

          Nucleated Red Blood Cell % 0.0 %     0-0                           [facility] ([facility] 

 

 

          Immature Granulocyte % 0.3 %     0-3.0                         [facility] 

([facility]  

 

          Mono #    0.8 10    0.0-0.8                       [facility]  

 

          Neutrophils # 4.8 10    1.5-8.5                       [facility]  

 

          Lymph #   1.3 10    1.5-5.0                       [facility]  

 

          Eos #     0.1 10    0.0-0.5                       [facility]  

 

          Baso #    0.1 10    0.0-0.2                       [facility]  







                                        Procedure

 

                                          



                                                                                
                                                                                
                                                                                
                                                                                
                                                                                
                                                          



Social History

          



           Code       Duration   Value      Status     Description Data Source(s

)

 

             Smoking      2021 12:00:00 AM EST Patient is a former smoker 

completed    Patient 

is a former smoker                      [facility] ([facility])

 

                    Smoking             2020 12:00:00 AM EST - 10/01/2016 

12:00:00 AM EDT Patient is a 

former smoker       completed           Patient is a former smoker [facility]



                                                                                
                            



Vital Signs

          



                    ID                  Date                Data Source

 

                    UNK                                      









           Name       Value      Range      Interpretation Code Description Data

 Source(s)

 

           Body surface area Derived from formula 1.85 m2                       

   1.85 m2    [facility] ([facility])

 

           Body weight 68.494 kg                        68.494 kg  [facility] ([facility])

 

           Ideal body weight 166 [lb_av]                       166 [lb_av] [facility]

T ([facility])

 

           Body mass index (BMI) [Ratio] 21.7 kg/m2                       21.7 k

g/m2 [facility] ([facility])

 

           Body weight 151.00 [lb_av]                       151.00 [lb_av] [facility]

T ([facility])

 

           Body height 70 [in_i]                        70 [in_i]  [facility] ([facility]

 

                                        5'10" 

 

           Oxygen saturation in Arterial blood by Pulse oximetry 97 %           

                  97 %       [facility] 

([facility])

 

                                        Room Air 

 

           Heart rate 80 /min                          80 /min    [facility] ([facility])

 

           Diastolic blood pressure 80 mm[Hg]                        80 mm[Hg]  

[facility] ([facility])

 

           Systolic blood pressure 140 mm[Hg]                       140 mm[Hg] [facility] ([facility])

 

           Body surface area Derived from formula 1.86 m2                       

   1.86 m2    [facility] ([facility])

 

           Body weight 68.947 kg                        68.947 kg  [facility] ([facility]

 

           [facility]av]                       [facility]

[facility] ([facility]

 

           Body mass index (BMI) [Ratio] 21.8 kg/m2                       21.8 k

g/m2 [facility] ([facility])

 

           Body weight 152.00 [lb_av]                       152.00 [facility]_av] [facility]

[facility] ([facility]

 

           Body height 70 [in_i]                        70 [in_i]  [facility] ([facility]

 

                                        5'10" 

 

           Body temperature 96.0 [degF]                       96.0 [degF] [facility]

 ([facility])

 

           Oxygen saturation in Arterial blood by Pulse oximetry 96 %           

                  96 %       [facility] 

([facility])

 

           Heart rate 111 /min                         111 /min   [facility] ([facility])

 

           Diastolic blood pressure 90 mm[Hg]                        90 mm[Hg]  

[facility] ([facility])

 

           Systolic blood pressure 140 mm[Hg]                       140 mm[Hg] [facility] ([facility])

 

           Body mass index (BMI) [Ratio] 22.0 kg/m2                       22.0 k

g/m2 [facility] ([facility]

 

           Ideal body weight 166 [lb_av]                       166 [lb_av] [facility]

T ([facility]

 

           Oxygen saturation in Arterial blood by Pulse oximetry 98 %           

                  98 %       [facility] 

([facility]

 

           Body weight 153.38 [lb_av]                       153.38 [lb_av] [facility]

[facility] ([facility]

 

           Body height 70 [in_i]                        70 [in_i]  [facility] ([facility]

 

                                        5'10" 

 

           Respiratory rate 18 /min                          18 /min    [facility] (

[facility]

 

           Body temperature 97.3 [degF]                       97.3 [degF] [facility]

 ([facility]

 

           Heart rate 88 /min                          88 /min    [facility]

 

           Diastolic blood pressure 85 mm[Hg]                        85 mm[Hg]  

[facility] ([facility]

 

                                        recheck 

 

           Systolic blood pressure 149 mm[Hg]                       149 mm[Hg] M

[facility] ([facility]

 

                                        recheck 

 

           Diastolic blood pressure 85 mm[Hg]                        85 mm[Hg]  

[facility] ([facility]

 

           Systolic blood pressure 153 mm[Hg]                       153 mm[Hg] M

[facility] ([facility]

 

           Body surface area Derived from formula 1.86 m2                       

   1.86 m2    [facility]

 

           Body weight 68.947 kg                        68.947 kg  [facility] ([facility]

 

           Ideal body weight 166 [lb_av]                       166 [lb_av] [facility]

T ([facility])

 

           Body mass index (BMI) [Ratio] 21.8 kg/m2                       21.8 k

g/m2 [facility] ([facility])

 

           Body weight 152.00 [lb_av]                       152.00 [lb_av] [facility]

T ([facility])

 

           Body height 70 [in_i]                        70 [in_i]  [facility] ([facility], )

 

                                        5'10" 

 

           Oxygen saturation in Arterial blood by Pulse oximetry 94 %           

                  94 %       [facility] 

([facility], )

 

                                        Room Air 

 

           Heart rate 94 /min                          94 /min    [facility] ([facility])

 

           Diastolic blood pressure 80 mm[Hg]                        80 mm[Hg]  

[facility]

 

           Systolic blood pressure 136 mm[Hg]                       136 mm[Hg] 

[facility]

 

           Body mass index (BMI) [Ratio] 21.5 kg/m2                       21.5 k

g/m2 [facility] ([facility]

 

           Ideal body weight 166 [lb_av]                       166 [lb_av] [facility]

T ([facility]

 

           Oxygen saturation in Arterial blood by Pulse oximetry 97 %           

                  97 %       [facility] 

([facility]

 

           Body weight 150.12 [lb_av]                       150.12 [lb_av] [facility]

T ([facility]

 

           Body height 70 [in_i]                        70 [in_i]  [facility] ([facility]

 

                                        5'10" 

 

           Respiratory rate 20 /min                          20 /min    [facility] (

[facility]

 

           Body temperature 96.6 [degF]                       96.6 [degF] [facility]

 ([facility]

 

           Heart rate 111 /min                         111 /min   [facility] ([facility]

 

           Diastolic blood pressure 81 mm[Hg]                        81 mm[Hg]  

[facility] ([facility]

 

           Systolic blood pressure 124 mm[Hg]                       124 mm[Hg] [facility] ([facility]

 

           Diastolic blood pressure 82 mm[Hg]                        82 mm[Hg]  

[facility] ([facility]

 

           Systolic blood pressure 150 mm[Hg]                       150 mm[Hg] [facility] ([facility]

 

           Oxygen saturation in Arterial blood by Pulse oximetry 97 %           

                  97 %       [facility] 

([facility]

 

           Body weight 159.19 [lb_av]                       159.19 [lb_av] [facility]

[facility] ([facility]

 

           Respiratory rate 16 /min                          16 /min    [facility] (

[facility]

 

           Body temperature 95.5 [degF]                       95.5 [degF] [facility]

 ([facility]

 

           Heart rate 81 /min                          81 /min    [facility] ([facility]

 

           Diastolic blood pressure 89 mm[Hg]                        89 mm[Hg]  

[facility] ([facility]

 

           Systolic blood pressure 149 mm[Hg]                       149 mm[Hg] M

[facility] ([facility]

 

           Diastolic blood pressure 82 mm[Hg]                        82 mm[Hg]  

[facility] ([facility]

 

           Systolic blood pressure 143 mm[Hg]                       143 mm[Hg] M

[facility] ([facility]

 

           Body temperature 97.6 [degF]                       97.6 [degF] [facility]

 ([facility]

## 2021-02-10 NOTE — CCD
Summarization Of Episode

                             Created on: 02/10/2021



MONSERRAT BARRON

External Reference #: [id number]

: 1955

Sex: Male



Demographics





                          Address                   [address] [id number]

 

                          Home Phone                [phone]

 

                          Preferred Language        English

 

                          Marital Status            

 

                          Yarsani Affiliation     NONE

 

                          Race                      White

 

                          Ethnic Group              Not  or 





Author





                          Author                    [facility]

 

                          Organization              [facility]

 

                          Address                   Unknown

 

                          Phone                     Unavailable







Support





                Name            Relationship    Address         Phone

 

                RE              Next Of Kin     Unknown         Unavailable

 

                    [facility] [facility] [place] [address]                    [phone]

 

                    [facility]              Next Of Kin         [address] [place]  [address]                    [phone]

 

                    SHAHANA ([facility]) ANITA Next Of Kin         [address] [place]  [address]                    [phone]

 

                    Anita Barron ECON                [address] [place]  [address]                    [phone]







Care Team Providers





                    Care Team Member Name Role                Phone

 

                    SKYLAR Andre MD Unavailable         Unavailable

 

                    Thai, SKYLAR Fisher MD Unavailable         Unavailable

 

                    Thai, SKYLAR Fisher MD Unavailable         Unavailable

 

                    Thai, SKYLAR Fisher MD Unavailable         Unavailable

 

                    Thai, SKYLAR Fisher MD Unavailable         Unavailable

 

                    Thai, SKYLAR Fisher MD Unavailable         Unavailable

 

                    Thai, SKYLAR Fisher MD Unavailable         Unavailable

 

                    Thai, SKYLAR Fisher MD Unavailable         Unavailable

 

                    Thai, SKYLAR Fisher MD Unavailable         Unavailable

 

                    Thai, SKYLAR Fisher MD Unavailable         Unavailable

 

                    Thai, SKYLAR Fisher MD Unavailable         Unavailable

 

                    Thai, SKYLAR Fisher MD Unavailable         Unavailable

 

                    Thai, SKYLAR Fisher MD Unavailable         Unavailable

 

                    Thai, SKYLAR Fisher MD Unavailable         Unavailable

 

                    Thai, SKYLAR Fisher MD Unavailable         Unavailable

 

                    Thai, SKYLAR Fisher MD Unavailable         Unavailable

 

                    Thai, SKYLAR Fisher MD Unavailable         Unavailable

 

                    Thai, SKYLAR Fisher MD Unavailable         Unavailable

 

                    Thai, SKYLAR Fisher MD Unavailable         Unavailable

 

                    Thai, SKYLAR Fisher MD Unavailable         Unavailable

 

                    Thai, SKYLAR Fisher MD Unavailable         Unavailable

 

                    Thai, SKYLAR Fisher MD Unavailable         Unavailable

 

                    Thai, SKYLAR Fisher MD Unavailable         Unavailable

 

                    Thai, SKYLAR Fisher MD Unavailable         Unavailable

 

                    Thai, SKYLAR Fisher MD Unavailable         Unavailable

 

                    Thai, SKYLAR Fisher MD Unavailable         Unavailable

 

                    Thai, SKYLAR Fisher MD Unavailable         Unavailable

 

                    Thai, SKYLAR Fisher MD Unavailable         Unavailable

 

                    Thai, SKYLAR Fisher MD Unavailable         Unavailable

 

                    Thai, SKYLAR Fisher MD Unavailable         Unavailable

 

                    Thai, SKYLAR Fisher MD Unavailable         Unavailable

 

                    Thai, SKYLAR Fisher MD Unavailable         Unavailable

 

                    Thai, SKYLAR Fisher MD Unavailable         Unavailable

 

                    Thai, SKYLAR Fisher MD Unavailable         Unavailable

 

                    Thai, SKYLAR Fisher MD Unavailable         Unavailable

 

                    Thai, SKYLAR Fishre MD Unavailable         Unavailable

 

                    Thai, SKYLAR Fisher MD Unavailable         Unavailable

 

                    Thai, SKYLAR Fisher MD Unavailable         Unavailable

 

                    Thai, SKYLAR Fisher MD Unavailable         Unavailable

 

                    Thai, SKYLAR Fisher MD Unavailable         Unavailable

 

                    Thai, SKYLAR Fisher MD Unavailable         Unavailable

 

                    Thai, SKYLAR Fisher MD Unavailable         Unavailable

 

                    Thai, SKYLAR Fisher MD Unavailable         Unavailable

 

                    Thai, SKYLAR Fisher MD Unavailable         Unavailable

 

                    Thai, SKYLAR Fisher MD Unavailable         Unavailable

 

                    Thai, SKYLAR Fisher MD Unavailable         Unavailable

 

                    Thai, SKYLAR Fisher MD Unavailable         Unavailable

 

                    Thai, SKYLAR Fisher MD Unavailable         Unavailable

 

                    Thai, SKYLAR Fisher MD Unavailable         Unavailable

 

                    Thai, SKYLAR Fisher MD Unavailable         Unavailable

 

                    Thai, SKYLAR Fisher MD Unavailable         Unavailable

 

                    Thai, SKYLAR Fisher MD Unavailable         Unavailable

 

                    Thai, SKYLAR Fisher MD Unavailable         Unavailable

 

                    Thai, SKYLAR Fisher MD Unavailable         Unavailable

 

                    Thai, SKYLAR Fisher MD Unavailable         Unavailable

 

                    Thai, SKYLAR Fisher MD Unavailable         Unavailable

 

                    Thai, SKYLAR Fisher MD Unavailable         Unavailable

 

                    Thai, SKYLAR Fisher MD Unavailable         Unavailable

 

                    Thai, SKYLAR Fisher MD Unavailable         Unavailable

 

                    Thai, SKYLAR Fisher MD Unavailable         Unavailable

 

                    Thai, SKYLAR Fisher MD Unavailable         Unavailable

 

                    Thai, SKYLAR Fisher MD Unavailable         Unavailable

 

                    Thai, SKYLAR Fisher MD Unavailable         Unavailable

 

                    Thai, SKYLAR Fisher MD Unavailable         Unavailable

 

                    Thai, SKYLAR Fisher MD Unavailable         Unavailable

 

                    Thai, SKYLAR Fisher MD Unavailable         Unavailable

 

                    Thai, SKYLAR Fisher MD Unavailable         Unavailable

 

                    Thai, SKYLAR Fisher MD Unavailable         Unavailable

 

                    Thai, SKYLAR Fisher MD Unavailable         Unavailable

 

                    Thai, SKYLAR Fihser MD Unavailable         Unavailable

 

                    Thai, SKYLAR Fisher MD Unavailable         Unavailable

 

                    Thai, SKYLAR Fisher MD Unavailable         Unavailable

 

                    Thai, SKYLAR Fisher MD Unavailable         Unavailable

 

                    Thai, SKYLAR Fisher MD Unavailable         Unavailable

 

                    Thai, SKYLAR Fisher MD Unavailable         Unavailable

 

                    Scordo, M Melanie PA Unavailable         Unavailable

 

                    Scordo, M Melanie PA Unavailable         Unavailable

 

                    Scordo, M Melanie PA Unavailable         Unavailable

 

                    Scordo, M Melanie PA Unavailable         Unavailable

 

                    Scordo, M Melanie PA Unavailable         Unavailable

 

                    Scordo, M Melanie PA Unavailable         Unavailable

 

                    Scordo, M Melanie PA Unavailable         Unavailable

 

                    Scordo, M Melanie PA Unavailable         Unavailable

 

                    Scordo, M Melanie PA Unavailable         Unavailable

 

                    Scordo, M Melanie PA Unavailable         Unavailable

 

                    Scordo, M Melanie PA Unavailable         Unavailable

 

                    Scordo, M Melanie PA Unavailable         Unavailable

 

                    Scordo, M Melanie PA Unavailable         Unavailable

 

                    Scordo, M Melanie PA Unavailable         Unavailable

 

                    Scordo, M Melanie PA Unavailable         Unavailable

 

                    Scordo, M Melanie PA Unavailable         Unavailable

 

                    Scordo, M Melanie PA Unavailable         Unavailable

 

                    Scordo, M Melanie PA Unavailable         Unavailable

 

                    Scordo, M Melanie PA Unavailable         Unavailable

 

                    Scordo, M Melanie PA Unavailable         Unavailable

 

                    Scordo, M Melanie PA Unavailable         Unavailable

 

                    Scordo, M Melanie PA Unavailable         Unavailable

 

                    Scordo, M Melanie PA Unavailable         Unavailable

 

                    Scordo, M Melanie PA Unavailable         Unavailable

 

                    Scordo, M Melanie PA Unavailable         Unavailable

 

                    Scordo, M Melanie PA Unavailable         Unavailable

 

                    Scordo, M Melanie PA Unavailable         Unavailable

 

                    Scordo, M Melanie PA Unavailable         Unavailable

 

                    Scordo, M Melanie PA Unavailable         Unavailable

 

                    Scordo, M Melanie PA Unavailable         Unavailable

 

                    Scordo, M Melanie PA Unavailable         Unavailable

 

                    Scordo, M Melanie PA Unavailable         Unavailable

 

                    Scordo, M Melanie PA Unavailable         Unavailable

 

                    Scordo, M Melanie PA Unavailable         Unavailable

 

                    Scordo, M Melanie PA Unavailable         Unavailable

 

                    Scordo, M Melanie PA Unavailable         Unavailable

 

                    Scordo, M Melanie PA Unavailable         Unavailable

 

                    Scordo, M Melanie PA Unavailable         Unavailable

 

                    Scordo, M Melanie PA Unavailable         Unavailable

 

                    Scordo, M Melanie PA Unavailable         Unavailable

 

                    Scordo, M Melanie PA Unavailable         Unavailable

 

                    Scordo, M Melanie PA Unavailable         Unavailable

 

                    THEA Fagan     Unavailable         Unavailable

 

                    Kris Echeverria MD Unavailable         Unavailable

 

                    Kris Echeverria MD Unavailable         Unavailable

 

                    Kris Echeverrai MD Unavailable         Unavailable

 

                    Kris Echeverria MD Unavailable         Unavailable

 

                    Kris Echeverria MD Unavailable         Unavailable

 

                    Kris Echeverria MD Unavailable         Unavailable

 

                    Kris Echeverria MD Unavailable         Unavailable

 

                    Kris Echeverria MD Unavailable         Unavailable

 

                    Kris Echeverria MD Unavailable         Unavailable

 

                    Kris Echeverria MD Unavailable         Unavailable

 

                    Kris Echeverria MD Unavailable         Unavailable

 

                    Kris Echeverria MD Unavailable         Unavailable

 

                    Kris Echeverria MD Unavailable         Unavailable

 

                    Kris Echeverria MD Unavailable         Unavailable

 

                    Kris Echeverria MD Unavailable         Unavailable

 

                    Kris Echeverria MD Unavailable         Unavailable

 

                    Kris Echeverria MD Unavailable         Unavailable

 

                    Kris Echeverria MD Unavailable         Unavailable

 

                    Kris Echeverria MD Unavailable         Unavailable

 

                    Kris Echeverria MD Unavailable         Unavailable

 

                    Kris Echeverria MD Unavailable         Unavailable

 

                    Kris Echeverria MD Unavailable         Unavailable

 

                    Kris Echeverria MD Unavailable         Unavailable

 

                    Kris Echeverria MD Unavailable         Unavailable

 

                    Kris Echeverria MD Unavailable         Unavailable

 

                    Kris Echeverria MD Unavailable         Unavailable

 

                    Kris Echeverria MD Unavailable         Unavailable

 

                    Kris Echeverria MD Unavailable         Unavailable

 

                    Kris Echeverria MD Unavailable         Unavailable

 

                    Kris Echeverria MD Unavailable         Unavailable

 

                    Kris Echeverria MD Unavailable         Unavailable

 

                    Kris Echeverria MD Unavailable         Unavailable

 

                    Kris Echeverria MD Unavailable         Unavailable

 

                    Kris Echeverria MD Unavailable         Unavailable

 

                    Echeverria, Kris Pang MD Unavailable         Unavailable

 

                    Echeverria, Kris Pang MD Unavailable         Unavailable

 

                    Echeverria, Kris Pang MD Unavailable         Unavailable

 

                    Echeverria, Kris Pang MD Unavailable         Unavailable

 

                    Echeverria, Kris Pang MD Unavailable         Unavailable

 

                    Echeverria, Kris Pang MD Unavailable         Unavailable

 

                    Echeverria, Kris Pang MD Unavailable         Unavailable

 

                    Echeverria, Kris Pang MD Unavailable         Unavailable

 

                    Echeverria, Kris Pang MD Unavailable         Unavailable

 

                    Echeverria, Kris Pang MD Unavailable         Unavailable

 

                    Echeverria, Kris Pang MD Unavailable         Unavailable

 

                    Echeverria, Kris Pang MD Unavailable         Unavailable

 

                    Echeverria, Kris Pang MD Unavailable         Unavailable

 

                    Echeverria, Kris Pang MD Unavailable         Unavailable

 

                    Echeverria, Kris Pang MD Unavailable         Unavailable

 

                    Echeverria, Kris Pang MD Unavailable         Unavailable

 

                    Echeverria, Kris Pang MD Unavailable         Unavailable



                                  



Re-disclosure Warning

          The records that you are about to access may contain information from 
federally-assisted alcohol or drug abuse programs. If such information is 
present, then the following federally mandated warning applies: This information
has been disclosed to you from records protected by federal confidentiality 
rules (42 CFR part 2). The federal rules prohibit you from making any further 
disclosure of this information unless further disclosure is expressly permitted 
by the written consent of the person to whom it pertains or as otherwise 
permitted by 42 CFR part 2. A general authorization for the release of medical 
or other information is NOT sufficient for this purpose. The Federal rules 
restrict any use of the information to criminally investigate or prosecute any 
alcohol or drug abuse patient.The records that you are about to access may 
contain highly sensitive health information, the redisclosure of which is 
protected by Article 27-F of the [facility] Public Health law. If you 
continue you may have access to information: Regarding HIV / AIDS; Provided by 
facilities licensed or operated by the [facility] Office of Mental Health; 
or Provided by the [facility] Office for People With Developmental 
Disabilities. If such information is present, then the following [place] 
mandated warning applies: This information has been disclosed to you from 
confidential records which are protected by state law. State law prohibits you 
from making any further disclosure of this information without the specific 
written consent of the person to whom it pertains, or as otherwise permitted by 
law. Any unauthorized further disclosure in violation of state law may result in
a fine or [facility] sentence or both. A general authorization for the release of 
medical or other information is NOT sufficient authorization for further disc
losure.                                                                         
    



Family History

          



             Family Member Name Family Member Gender Family Member Status Date o

f Status 

Description                             Data Source(s)

 

           Unknown    Unknown    Problem                          [facility] ([facility]

 

           Unknown    Unknown    Problem                          [facility] ([facility]

 

           Unknown    Male       Problem                          [facility] ([facility]

 

           Unknown    Unknown                                      



                                                                                
                                     



Encounters

          



           Encounter  Providers  Location   Date       Indications Data Source(s

)

 

             Outpatient   Referrer: Cory Fagan              2021 08:51:0

0 AM EST Solitary 

pulmonary nodule                        [facility]

 

                                        Solitary pulmonary nodule 

 

                Office Visit    Attender: Bird Munguia/[place]/Trace/R

eindl 2021 

10:30:00 AM EST                                     [facility] ([facility]

[facility], )

 

                Outpatient      Attender: Bird Munguia/[place]less[place]/Trace/R

eindl 2020 

10:30:00 AM EST                                     [facility]

[facility], [facility]

 

           Outpatient Attender: Natalia Andre MD Main Office 2020 10:45:0

0 AM EST            

[facility] ([facility]

 

                [facility]                 [address] [place] [address] 

2020 12:00:00 AM EST                           eCW1 ([facility])

 

                Office Visit    Attender: Bird Munguia/Blessing/Trace/R

eindl 2020 

12:45:00 PM EST                                     [facility] ([facility]

actice, )

 

           Outpatient Attender: Natalia Andre MD Main Office 10/27/2020 09:00:0

0 AM EDT            

[facility] ([facility]

 

           Outpatient Attender: Melanie SALAS Main Office 2020 09:40:00

 AM EDT            

[facility] ([facility]



                                                                                
                                                                             



Immunizations

          



             Vaccine      Date         Status       Description  Data Source(s)

 

             New in .  IIV4 10/18/2020 09:00:00 AM EDT completed            

     [facility] ([facility]

 

             pneumococcal polysaccharide PPV23 2020 07:16:00 AM EDT comple

familia                 [facility] 

([facility]



                                                                                
                 



Medications

          



          Medication Brand Name Start Date Product Form Dose      Route     Admi

nistrative 

Instructions Pharmacy Instructions Status     Indications Reaction   Description

 Data 

Source(s)

 

           Cefuroxime 500 MG Oral Tablet Cefuroxime Axetil 2021 12:00:00 A

M EST                       

ORAL                          completed                               [facility] ([facility])

 

           Cefuroxime 500 MG Oral Tablet Cefuroxime Axetil 2020 12:00:00 A

M EST                       

ORAL                          completed                               [facility] ([facility])

 

                    doxycycline hyclate 100 MG Oral Tablet Doxycycline Hyclate 1

 12:00:00 

AM EDT               ORAL                 completed                      [facility] 

([facility]

 

           Cefuroxime 500 MG Oral Tablet [Ceftin] Ceftin     10/19/2020 12:00:00

 AM EDT                       

ORAL                          completed                               [facility] ([facility]

 

           Levofloxacin 500 MG Oral Tablet Levofloxacin 10/12/2020 12:00:00 AM E

DT                       ORAL

                              completed                               [facility] ([facility])



                                                                                
                            



Insurance Providers

          



             Payer name   Policy type / Coverage type Policy ID    Covered party

 ID Covered 

party's relationship to cheek Policy Cheek             Plan Information

 

          MEDICARE BLUE PPO      [id number]           [id number]           SP        

          [id number]

 

          [employer] MED[employer] BLUE PPO G         [id number]           Self      

          [id number]

 

          [employer] MED[employer] BLUE PPO G         [id number]           Self      

          [id number]

 

          [employer] BCBS B         [id number]           S                   [id number]

[id number]

 

          MEDICARE            [id number]           SP                  [id number]

[id number]

 

          [facility]           [id number]           SP                  [id number]

[id number]

 

          MEDICARE BLUE PPO      [id number]           [id number]           SP         

         [id number]

 

          MEDICARE BLUE PPO      306           [id number]           SP         

         [id number]

 

          [employer] Medicare Commercial [id number]           Self                vy

[id number]

 

          [facility]       Health Maintenance Organization (HMO) [id number]           

Self                [id number]

 

          Medicare Blue Ppo Commercial [id number]           Self              

  [id number]

 

          [employer]BS Medicare Commercial [id number]           Self                vy

[id number]

 

                    ANSI-Commercial [id number]        

                       

[id number]

 

                    ANSI-Not a Secondary Insurance [id number]

[id number]                               

[id number]

 

                    ANSI-Commercial [id number]        

                       

[id number]

 

                    ANSI-Not a Secondary Insurance [id number]

[id number]                               

[id number]

 

          [facility]           [id number]           SP                  [id number]

[id number]

 

                    ANSI-Commercial [id number]        

                       

[id number]

 

                    ANSI-Not a Secondary Insurance [id number]

[id number]                               

[id number]

 

          [facility] Commercial [id number]           Self                [id number]

[id number]

 

          [facility] Commercial [id number]           Self                [id number]

[id number]

 

          [facility] Health Care Commercial [id number]           Self                8

[id number]

 

          [facility]       Health Maintenance Organization (HMO) [id number]           

Self                [id number]

 

          [facility]           [id number]           SP                  82

[id number]

 

          [facility] O         [id number]           S                   82

[id number]

 

          [facility] Commercial [id number]           Self                8

[id number]

 

          [facility]           [id number]           SP                  82

[id number]

 

          [facility] Commercial [id number]           Self                8

[id number]

 

          [facility]  [facility] [id number]           Self                [id number]

[id number]

 

          [facility]       Health Maintenance Organization (HMO) [id number]           

Self                [id number]

 

          [facility]           [id number]           SP                  82

[id number]

 

          [facility]           [id number]           SP                  82

[id number]

 

          [employer] [place] [employer]           [id number]           SP       

           [id number]

 

          [facility] O         [id number]           S                   82

[id number]

 

          [employer]UE [employer]OSS           [id number]           S                   [id number]

[id number]

 

          [employer]         [id number]           S                   [id number]

[id number]

 

          [employer]                     Se

lf                 

 

          [employer] [facility] Health Maintenance Organization ([facility]           

          Self                 

 

          [employer]                     Se

lf                 

 

          [employer]                     Se

lf                 

 

          [employer] B         [id number]           S                   [id number]

[id number]

 

          [employer] [place] [address]           [id number]           SP       

           [id number]

 

          [employer]                     Se

lf                 

 

          BS Of On license of [facility] ([facility]           

          Self                 

 

          [employer]                     Se

lf                 

 

          [employer]                     Se

lf                 

 

          [employer]                     Se

lf                 

 

          [employer]                     Se

lf                 

 

          [employer]                     Se

lf                 

 

          [employer]                     Se

lf                 

 

          [employer]                     Se

lf                 

 

          [employer]                     Se

lf                 

 

          [employer]                     Se

lf                 

 

          [employer]                     Se

lf                 

 

                    O         UNAVAILABLE                               UNAVAILA

BLE

 

          [employer]                     Se

lf                 

 

          [employer]                     Se

lf                 

 

          [employer]                     Se

lf                 

 

          [employer] [place] [facility] Part B                     Self      

           

 

          [employer] [place] Commercial                     Self          

       

 

          [employer] [place] [address]           [id number]           SP       

           [id number]

 

                              [id number]                               [id number]

[id number]



                                                                                
                                                                                
                                                                                
                                                                                
                                                                                
                                                                                
                                                                                
                                                                                
                                                          



Problems, Conditions, and Diagnoses

          



           Code       Display Name Description Problem Type Effective Dates Data

 Source(s)

 

           L57.0      [id number]  Actinic keratoses Problem    2019 12:00:0

0 AM EST eCW1 

([facility])

 

             R91.1        Solitary pulmonary nodule Solitary pulmonary nodule Di

agnosis    2021 

08:51:00 AM EST                         [facility]



                                                                                
                            



Surgeries/Procedures

          



             Procedure    Description  Date         Indications  Data Source(s)

 

             CT GUIDANCE NEEDLE PLACEMENT              2021 12:00:00 AM ES

T              [facility] ([facility], )

 

             DEMO&/EVAL OF PT UTILIZ AERSL GEN/NEB/INHLR/IPPB               12:00:00 AM EST              

[facility] ([facility], )

 

             Spirometry                2020 12:00:00 AM EST              M

[facility] ([facility], 

)



                                                                                
                            



Results

          



                    ID                  Date                Data Source

 

                    [id number]            2021 11:50:00 AM EST [facility] ([facility]









          Name      Value     Range     Interpretation Code Description Data Mary

rce(s) Supporting 

Document(s)

 

                          Bacteria identified in Unspecified specimen by Anaerob

e culture Laboratory test 

result                                              [facility] ([facility]  

 

                                        ****************************************

*********

If anaerobic or aerobic growth is detected within

the next 7-21 days, an addendum will follow.

                                        .***************************************

********.



FULL REPORT IN LAB NOTES ([facility]CW and [facility]).

NO GROWTH ANAEROBICALLY



 









                    ID                  Date                Data Source

 

                    [id number]            2021 11:50:00 AM EST [facility] ([facility]









          Name      Value     Range     Interpretation Code Description Data Mary

rce(s) Supporting 

Document(s)

 

          Gram Stain Laboratory test result                               [facility]

 ([facility]  

 

                                        MANY WBCS

MANY RBCS

FEW GRAM POSITIVE COCCI IN PAIRS AND CHAINS



 

 

           Body Fluid Culture Laboratory test result                            

      [facility]                                   

 

                                        <content>FULL REPORT IN LAB NOTES (eCW a

nd 

[facility]).</content><br/><content></content><br/><content>ORGANISM 1: STREP 
CONSTELLATUS SPP PHARYNG</content><br/><content></content><br/><content>QUANTITY
 OF GROWTH            
HEAVY</content><br/><content></content><br/><content></content><br/><content>ORG

ANISM 1: STREP CONSTELLATUS SPP 
PHARYNG</content><br/><content></content><br/><content>STREP CONSTELLATUS SPP 
PHARYNG:                    REACTION</content><br/><content>TETRACYCLINE        
               PO         250 mg qid 0.5      S</content><br/><content>
PENICILLIN G                       IV         1 mu  q6H 0.5      
I</content><br/><content>PENICILLIN G                       IV         1 mu  q6h
 0.5      I</content><br/><content>PENICILLIN G                       PO        
 250mg q6h fasting 0.5      I</content><br/><content>AMPICILLIN                 
        IV         500mg q6h <=0.25      S</content><br/><content>AMPICILLIN    
                     PO         500mg q6h fasting <=0.25      
S</content><br/><content>ERYTHROMYCIN                       IV         500mg q6h
 <=0.12      S</content><br/><content>ERYTHROMYCIN                       PO     
    500mg q6h <=0.12      S</content><br/><content>CLINDAMYCIN                  
      IV         600mg q6h 0.5      I</content><br/><content>CLINDAMYCIN        
                PO         150mg q6h 0.5      
I</content><br/><content>LEVOFLOXACIN                       IV         500mg qd 
0.5      S</content><br/><content>LEVOFLOXACIN                       PO         
250mg qd 0.5      S</content><br/><content>LEVOFLOXACIN                       PO
         500mg qd 0.5      S</content><br/><content>VANCOMYCIN                  
       IV         500mg q8h 0.5      S</content><br/><content>MOXIFLOXACIN 
(AVELOX)              IV         400MG QD 0.12      S</content><br/><content>
MOXIFLOXACIN (AVELOX)              PO         400MG QD 0.12      
S</content><br/><content>CEFTRIAXONE                        IV         1gm q24h 
<=0.12      S</content><br/><content>CEFOTAXIME                         IV      
   1gm  q8h <=0.12      S</content><br/><content></content> 









                    ID                  Date                Data Source

 

                    [id number]            2021 11:50:00 AM EST [facility] ([facility]









          Name      Value     Range     Interpretation Code Description Data Mary

rce(s) Supporting 

Document(s)

 

           PH Body Fluid Laboratory test result                                 

 [facility]                                    

 

                                        CLOTTED, UNABLE TO PERFORM TESTING

 

 

           Source, Body Fluid pH Laboratory test result                         

         [facility] ([facility]                              









                    ID                  Date                Data Source

 

                    [id number]            2021 11:50:00 AM EST [facility] ([facility]









          Name      Value     Range     Interpretation Code Description Data Mary

rce(s) Supporting 

Document(s)

 

          Amylase, Body Fluid 42 U/L                                  [facility] ([facility]  

 

           Source, Body Fluid Amylase Laboratory test result                    

              [facility] ([facility]                    









                    ID                  Date                Data Source

 

                    [id number]            2021 11:50:00 AM EST [facility] ([facility]









          Name      Value     Range     Interpretation Code Description Data Mary

rce(s) Supporting 

Document(s)

 

           Source, Body Fluid Glucose Laboratory test result                    

              [facility] ([facility]                    

 

          Glucose, Body Fluid 18 mg/dL                                [facility] ([facility]  









                    ID                  Date                Data Source

 

                    [id number]            2021 11:50:00 AM EST [facility] ([facility]









          Name      Value     Range     Interpretation Code Description Data Mary

rce(s) Supporting 

Document(s)

 

           Source, Body Fluid LDH Laboratory test result                        

          [facility] ([facility]                              

 

          LDH, Body Fluid 07345 U/L                               [facility]  









                    ID                  Date                Data Source

 

                    [id number]            2021 11:50:00 AM EST [facility] ([facility]









          Name      Value     Range     Interpretation Code Description Data [place] campuse(s) Supporting 

Document(s)

 

           Source, Body Fluid Tot Protein Laboratory test result                

                  [facility] ([facility]                    

 

          Total Protein, Body Fluid 5.2 g/dL                                [facility]

[facility] ([facility] 

 









                    ID                  Date                Data Source

 

                    [id number]            2021 11:50:00 AM EST [facility] ([facility]









          Name      Value     Range     Interpretation Code Description Data [place] campuse(s) Supporting 

Document(s)

 

           Source, Body Fluid Laboratory test result                            

      [facility]                                   

 

           Appearance, Body Fluid Laboratory test result                        

          [facility] ([facility]                              

 

           Pleural FL Color Laboratory test result                              

    [facility]                                    

 

           WBC Body Fluid Laboratory test result 0-10                           

  [facility]                                    

 

                                        CLOTTED, UNABLE TO PERFORM TESTING

 

 

           RBC Body Fluid Laboratory test result                                

  [facility]                                    

 

                                        CLOTTED, UNABLE TO PERFORM TESTING

 









                    ID                  Date                Data Source

 

                    [id number]            2021 11:50:00 AM EST [facility] ([facility]









          Name      Value     Range     Interpretation Code Description Data [place] campuse(s) Supporting 

Document(s)

 

           Amylase [Enzymatic activity/volume] in Body fluid Laboratory test res

ult                                  

[facility] ([facility]   

 

           Laboratory test finding (navigational concept) Laboratory test result

                                  

[facility] ([facility]   

 

                                        MANY WBCS

MANY RBCS

FEW GRAM POSITIVE COCCI IN PAIRS AND CHAINS



 

 

                          Bacteria identified in Unspecified specimen by Anaerob

e culture Laboratory test 

result                                              [facility] ([facility]  

 

                                        Comments: RIGHT PLEURAL EFFUSION

By: PEDRO STUART

Time: 1047

Description if other: RIGHT PLEURAL EFFUSION

By: PEDRO STUART

Time: 1046

 









                    ID                  Date                Data Source

 

                    [id number]            2021 11:10:00 AM EST [facility] ([facility]









          Name      Value     Range     Interpretation Code Description Data Mary

rce(s) Supporting 

Document(s)

 

                                        Lactate dehydrogenase [Enzymatic activit

y/volume] in Serum or Plasma by Lactate 

to pyruvate reaction 120 U/L                               [facility]  

 

                                        By: PARKER GALE

Time: 1037

By: PARKER GALE

Time: 1038

By: PARKER GALE Time: 1037 By: PARKER GALE Time: 1038

 









                    ID                  Date                Data Source

 

                    [id number]            2021 11:10:00 AM EST [facility] ([facility]









          Name      Value     Range     Interpretation Code Description Data Mary

rce(s) Supporting 

Document(s)

 

          Blood Urea Nitrogen 21 mg/dL  7-18                          [facility] ([facility]  

 

          Glucose, Fasting 96 mg/dL                          [facility]  

 

           Creatinine For GFR 0.95 mg/dL 0.70-1.30                        [facility]

 ([facility]                                    

 

           Glomerular Filtration Rate Laboratory test result                    

              [facility] ([facility]                    

 

                                        <content>Units are mL/min/1.73 

m2</content><br/><content></content><br/><content>Chronic Kidney Disease Staging
per NKF:</content><br/><content></content><br/><content>Stage I & II   GFR >=60 
     Normal to Mildly Decreased</content><br/><content>Stage III      GFR 30-59 
    Moderately Decreased</content><br/><content>Stage IV       GFR 15-29      
Severely Decreased</content><br/><content>Stage V        GFR <15        Very 
Little GFR Left</content><br/><content>ESRD           GFR <15 on 
RRT</content><br/><content></content> 

 

          Chloride Level 110 meq/L                         [facility]  

 

          Sodium Level 142 meq/L 136-145                       [facility]  

 

          Potassium Serum 4.0 meq/L 3.5-5.1                       [facility]  

 

          Anion Gap 8 meq/L   8-16                          [facility]  

 

          Calcium Level 10.0 mg/dL 8.8-10.2                      [facility]  

 

          Carbon Dioxide Level 24 meq/L  21-32                         [facility] ([facility]  

 

          Ast/Sgot  7 U/L     7-37                          [facility]  

 

          Alt/SGPT  19 U/L    12-78                         [facility]  

 

          Bilirubin,Total 0.8 mg/dL 0.2-1.0                       [facility]  

 

          Alkaline Phosphatase 78 U/L                            [facility]  

 

          Albumin/Globulin Ratio 0.9                                     [facility] 

([facility]  

 

          Albumin   3.5 GM/DL 3.2-5.2                       [facility]  

 

          Total Protein 7.6 GM/DL 6.4-8.2                       [facility]  









                    ID                  Date                Data Source

 

                    [id number]            2021 11:10:00 AM EST [facility] ([facility]









          Name      Value     Range     Interpretation Code Description Data Mary

rce(s) Supporting 

Document(s)

 

          White Blood Count 10.2 10   4.0-10.0                      [facility] ([facility]  

 

                                        A Pathologist review of this differentia

l can help in the

evaluation of a differential diagnosis.  Please order a

Pathologist Review (PERISM) if deemed necessary.  Results

are subject to change if a Pathologist Review is performed.

 

 

          Red Blood Count 4.78 10   4.30-6.10                     [facility]  

 

          Hemoglobin 15.4 g/dL 13.5-17.5                     [facility]  

 

           Mean Corpuscular Volume 97.1 fl    80.0-96.0                        M

[facility]                                    

 

          Hematocrit 46.4 %    42.0-52.0                     [facility]  

 

           Mean Corpuscular Hemoglobin 32.2 pg    27.0-33.0                     

   [facility]                              

 

           Red Cell Distribution Width 13.0 %     11.5-14.5                     

   [facility]                              

 

           Mean Corpuscular HGB Conc 33.2 g/dL  32.0-36.5                       

 [facility]                              

 

           Platelet Count, Automated 237 10     150-450                         

 [facility]                                    

 

          Lymph %   10.6 %    24.0-44.0                     [facility]  

 

          Neutrophils % 75.3 %    36.0-66.0                     [facility]  

 

          Mono %    12.9 %    0.0-5.0                       [facility]  

 

          Eos %     0.3 %     0.0-3.0                       [facility]  

 

          Immature Granulocyte % 0.3 %     0-3.0                         [facility] 

([facility]  

 

          Baso %    0.6 %     0.0-1.0                       [facility]  

 

          Neutrophils # 7.6 10    1.5-8.5                       [facility]  

 

          Nucleated Red Blood Cell % 0.0 %     0-0                           [facility]

[facility] ([facility] 



 

          Lymph #   1.1 10    1.5-5.0                       [facility]  

 

          Eos #     0.0 10    0.0-0.5                       [facility]  

 

          Mono #    1.3 10    0.0-0.8                       [facility]  

 

          Baso #    0.1 10    0.0-0.2                       [facility]  









                    ID                  Date                Data Source

 

                    [id number]         2021 11:10:00 AM EST [facility] ([facility])









          Name      Value     Range     Interpretation Code Description Data Mary

rce(s) Supporting 

Document(s)

 

                    Lactate dehydrogenase [Enzymatic activity/volume] in Serum o

r Plasma 120 U/L             

              Normal (applies to non-numeric results)                     

[facility] ([facility])                            

 

                                        By: PARKER GALE

Time: 1037

By: PARKER GALE

Time: 1038

By: PARKER GALE Time: 1037 By: PARKER GALE Time: 1038

 









                    ID                  Date                Data Source

 

                    [id number]         2021 11:10:00 AM EST [facility] ([facility])









          Name      Value     Range     Interpretation Code Description Data Mary

rce(s) Supporting 

Document(s)

 

           Glucose, Fasting 96 mg/dL        Normal (applies to non-numeric

 results)            

[facility] ([facility])  

 

           Blood Urea Nitrogen 21 mg/dL   7-18       Above high normal          

  [facility] ([facility], )                            

 

                Glomerular Filtration Rate Laboratory test result               

  Normal (applies to non-

numeric results)                        [facility]) 

 

 

                                        <content>Units are mL/min/1.73 

m2</content><br/><content></content><br/><content>Chronic Kidney Disease Staging
per NKF:</content><br/><content></content><br/><content>Stage I & II   GFR >=60 
     Normal to Mildly Decreased</content><br/><content>Stage III      GFR 30-59 
    Moderately Decreased</content><br/><content>Stage IV       GFR 15-29      
Severely Decreased</content><br/><content>Stage V        GFR <15        Very 
Little GFR Left</content><br/><content>ESRD           GFR <15 on 
RRT</content><br/><content></content> 

 

             Creatinine For GFR 0.95 mg/dL   0.70-1.30    Normal (applies to non

-numeric results) 

                          [facility] ([facility], )  

 

           Sodium Level 142 meq/L  136-145    Normal (applies to non-numeric res

ults)            [facility] 

([facility])         

 

           Chloride Level 110 meq/L       Above high normal            MED

ENT ([facility], )                            

 

           Potassium Serum 4.0 meq/L  3.5-5.1    Normal (applies to non-numeric 

results)            

[facility] ([facility])  

 

           Calcium Level 10.0 mg/dL 8.8-10.2   Normal (applies to non-numeric re

sults)            

[facility] ([facility])  

 

           Anion Gap  8 meq/L    8-16       Normal (applies to non-numeric resul

ts)            [facility] 

([facility])         

 

           Carbon Dioxide Level 24 meq/L   21-32      Normal (applies to non-num

mynor results)            

[facility] ([facility])  

 

           Ast/Sgot   7 U/L      7-37       Normal (applies to non-numeric resul

ts)            [facility])                    

 

           Alkaline Phosphatase 78 U/L          Normal (applies to non-num

mynor results)            

[facility] ([facility])  

 

           Alt/SGPT   19 U/L     12-78      Normal (applies to non-numeric resul

ts)            [facility] 

([facility], )         

 

           Bilirubin,Total 0.8 mg/dL  0.2-1.0    Normal (applies to non-numeric 

results)            

[facility])  

 

           Total Protein 7.6 GM/DL  6.4-8.2    Normal (applies to non-numeric re

sults)            [facility])         

 

           Albumin/Globulin Ratio 0.9                   Normal (applies to non-n

umeric results)            [facility])         

 

           Albumin    3.5 GM/DL  3.2-5.2    Normal (applies to non-numeric resul

ts)            [facility])         









                    ID                  Date                Data Source

 

                    [id number]         2021 11:10:00 AM EST [facility] ([facility])









          Name      Value     Range     Interpretation Code Description Data Mary

rce(s) Supporting 

Document(s)

 

           Red Blood Count 4.78 10    4.30-6.10  Normal (applies to non-numeric 

results)            

[facility] ([facility], )  

 

           White Blood Count 10.2 10    4.0-10.0   Above high normal            

[facility] ([facility])                            

 

                                        A Pathologist review of this differentia

l can help in the

evaluation of a differential diagnosis.  Please order a

Pathologist Review (PERISM) if deemed necessary.  Results

are subject to change if a Pathologist Review is performed.

 

 

           Hemoglobin 15.4 g/dL  13.5-17.5  Normal (applies to non-numeric resul

ts)            [facility] 

([facility], )         

 

           Hematocrit 46.4 %     42.0-52.0  Normal (applies to non-numeric resul

ts)            [facility])         

 

           Mean Corpuscular Volume 97.1 fl    80.0-96.0  Above high normal      

      [facility])                    

 

                Mean Corpuscular HGB Conc 33.2 g/dL       32.0-36.5       Normal

 (applies to non-numeric 

results)                                [facility]) 

 

 

                Mean Corpuscular Hemoglobin 32.2 pg         27.0-33.0       Norm

al (applies to non-numeric 

results)                                [facility]) 

 

 

                Red Cell Distribution Width 13.0 %          11.5-14.5       Norm

al (applies to non-numeric 

results)                                [facility] ([facility]) 

 

 

                Platelet Count, Automated 237 10          150-450         Normal

 (applies to non-numeric results)

                                        [facility] ([facility]) 

 

 

           Neutrophils % 75.3 %     36.0-66.0  Above high normal            [facility]

[facility] ([facility])                            

 

           Lymph %    10.6 %     24.0-44.0  Below low normal            [facility] (

[facility])                                      

 

           Mono %     12.9 %     0.0-5.0    Above high normal            [facility] 

([facility])                                      

 

           Eos %      0.3 %      0.0-3.0    Normal (applies to non-numeric resul

ts)            [facility] ([facility])                    

 

           Baso %     0.6 %      0.0-1.0    Normal (applies to non-numeric resul

ts)            [facility] ([facility])                    

 

           Immature Granulocyte % 0.3 %      0-3.0      Normal (applies to non-n

umeric results)            

[facility] ([facility])  

 

           Nucleated Red Blood Cell % 0.0 %      0-0        Normal (applies to n

on-numeric results)            

[facility] ([facility])  

 

           Neutrophils # 7.6 10     1.5-8.5    Normal (applies to non-numeric re

sults)            [facility] 

([facility])         

 

           Lymph #    1.1 10     1.5-5.0    Below low normal            [facility] (

[facility])                                      

 

           Eos #      0.0 10     0.0-0.5    Normal (applies to non-numeric resul

ts)            [facility] ([facility])                    

 

           Mono #     1.3 10     0.0-0.8    Above high normal            [facility] 

([facility])                                      

 

           Baso #     0.1 10     0.0-0.2    Normal (applies to non-numeric resul

ts)            [facility] 

([facility])         









                    ID                  Date                Data Source

 

                    [id number]         2021 09:29:00 AM EST [facility] ([facility])









          Name      Value     Range     Interpretation Code Description Data Mary

rce(s) Supporting 

Document(s)

 

           Prothrombin Time 13.1 s     12.5-14.3  Normal (applies to non-numeric

 results)            

[facility] ([facility])  

 

                Partial Thromboplastin Time 34.6 s          24.2-38.5       Norm

al (applies to non-numeric 

results)                                [facility] ([facility]) 

 

 

           Inr        0.97                  Normal (applies to non-numeric resul

ts)            [facility] ([facility])                            

 

                                        THERAPUTIC HUMAN INR VALUES

INDICATIONS                      NORMAL RANGES

PROPHYLAXIS/TREATMENT OF:

VENOUS THROMBOSIS                2.0-3.0

PULMONARY EMBOLISM               2.0-3.0

PREVENTION OF SYSTEMIC EMBOLISM FROM:

TISSUE HEART VALVES              2.0-3.0

ACUTE MYOCARDIAL INFARCTION      2.0-3.0

VALVULAR HEART DISEASE           2.0-3.0

ATRIAL FIBRILLATION              2.0-3.0

MECHANICAL VALVES(HIGH RISK)     2.5-3.5

RECURRENT MYOCARDIAL INFARCTION  2.5-3.5

 









                    ID                  Date                Data Source

 

                    [id number]         2021 09:29:00 AM EST [facility] ([facility])









          Name      Value     Range     Interpretation Code Description Data Mary

rce(s) Supporting 

Document(s)

 

           aPTT in Platelet poor plasma by Coagulation assay Laboratory test res

ult                                  

[facility] ([facility])  

 

                Platelets [#/volume] in Blood by Automated count 312 10         

 150-450         Normal (applies 

to non-numeric results)                     [facility] ([facility])  









                    ID                  Date                Data Source

 

                    [id number]            2021 10:35:00 AM EST [facility] ([facility]









          Name      Value     Range     Interpretation Code Description Data Mary

rce(s) Supporting 

Document(s)

 

          Red Blood Count 4.60 10   4.30-6.10                     [facility] ([facility]  

 

          Hemoglobin 14.9 g/dL 13.5-17.5                     [facility] ([facility]  

 

          White Blood Count 10.2 10   4.0-10.0                      [facility] ([facility]  

 

           Mean Corpuscular Volume 94.1 fl    80.0-96.0                        M

[facility] ([facility]                                    

 

          Hematocrit 43.3 %    42.0-52.0                     [facility] ([facility]  

 

           Mean Corpuscular Hemoglobin 32.4 pg    27.0-33.0                     

   [facility] ([facility]                              

 

           Red Cell Distribution Width 12.7 %     11.5-14.5                     

   [facility] ([facility]                              

 

           Platelet Count, Automated 285 10     150-450                         

 [facility]                                    

 

           Mean Corpuscular HGB Conc 34.4 g/dL  32.0-36.5                       

 [facility] ([facility]                              

 

          Lymph %   8.1 %     24.0-44.0                     [facility]  

 

          Neutrophils % 80.3 %    36.0-66.0                     [facility] ([facility]  

 

          Mono %    9.7 %     0.0-5.0                       [facility]  

 

          Eos %     0.5 %     0.0-3.0                       [facility]  

 

          Baso %    0.7 %     0.0-1.0                       [facility]  

 

          Immature Granulocyte % 0.7 %     0-3.0                         [facility] 

([facility]  

 

          Neutrophils # 8.2 10    1.5-8.5                       [facility] ([facility]  

 

          Nucleated Red Blood Cell % 0.0 %     0-0                           [facility]

[facility] ([facility] 

 

 

          Lymph #   0.8 10    1.5-5.0                       [facility]  

 

          Mono #    1.0 10    0.0-0.8                       [facility]  

 

          Eos #     0.1 10    0.0-0.5                       [facility]  

 

          Baso #    0.1 10    0.0-0.2                       [facility]  









                    ID                  Date                Data Source

 

                    [id number]            2021 10:35:00 AM EST [facility] ([facility]









          Name      Value     Range     Interpretation Code Description Data Mary

rce(s) Supporting 

Document(s)

 

           Creatinine For GFR 0.85 mg/dL 0.70-1.30                        [facility]

 ([facility]                                    

 

          Blood Urea Nitrogen 17 mg/dL  7-18                          [facility] ([facility]  

 

          Glucose, Fasting 103 mg/dL                         [facility] ([facility]  

 

          Sodium Level 139 meq/L 136-145                       [facility] ([facility]  

 

           Glomerular Filtration Rate Laboratory test result                    

              [facility] ([facility]                    

 

                                        <content>Units are mL/min/1.73 

m2</content><br/><content></content><br/><content>Chronic Kidney Disease Staging
 per NKF:</content><br/><content></content><br/><content>Stage I & II   GFR >=60
       Normal to Mildly Decreased</content><br/><content>Stage III      GFR 30-
59      Moderately Decreased</content><br/><content>Stage IV       GFR 15-29    
  Severely Decreased</content><br/><content>Stage V        GFR <15        Very 
Little GFR Left</content><br/><content>ESRD           GFR <15 on 
RRT</content><br/><content></content> 

 

          Carbon Dioxide Level 23 meq/L  21-32                         [facility] ([facility]  

 

          Anion Gap 7 meq/L   8-16                          [facility]  

 

          Chloride Level 109 meq/L                         [facility] ([facility]  

 

          Potassium Serum 4.1 meq/L 3.5-5.1                       [facility] ([facility]  

 

          Calcium Level 9.7 mg/dL 8.8-10.2                      [facility] ([facility]  

 

          Ast/Sgot  11 U/L    7-37                          [facility]  

 

          Alkaline Phosphatase 78 U/L                            [facility] ([facility]  

 

          Bilirubin,Total 0.6 mg/dL 0.2-1.0                       [facility] ([facility]  

 

          Alt/SGPT  31 U/L    12-78                         [facility]  

 

          Total Protein 7.3 GM/DL 6.4-8.2                       [facility] ([facility]  

 

          Albumin/Globulin Ratio 0.7                                     [facility] 

([facility]  

 

          Albumin   3.1 GM/DL 3.2-5.2                       [facility]  









                    ID                  Date                Data Source

 

                    [id number]            2021 10:35:00 AM EST [facility] ([facility]









          Name      Value     Range     Interpretation Code Description Data Mary

rce(s) Supporting 

Document(s)

 

                                        Lactate dehydrogenase [Enzymatic activit

y/volume] in Serum or Plasma by Lactate 

to pyruvate reaction 107 U/L                               [facility] ([facility]  

 

                                        By: FRANCI MOTTA

Time: 0854

By: FRANCI MOTTA Time: 08

 









                    ID                  Date                Data Source

 

                    [id number]         2021 10:35:00 AM EST [facility] ([facility], )









          Name      Value     Range     Interpretation Code Description Data Mary

rce(s) Supporting 

Document(s)

 

                    Lactate dehydrogenase [Enzymatic activity/volume] in Serum o

r Plasma 107 U/L             

              Normal (applies to non-numeric results)                     

[facility])                            

 

                                        By: FRANCI MOTTA

Time: 854

By: FRANCI MOTTA Time: 854

 









                    ID                  Date                Data Source

 

                    [id number]         2021 10:35:00 AM EST [facility] ([facility])









          Name      Value     Range     Interpretation Code Description Data Mary

rce(s) Supporting 

Document(s)

 

           Glucose, Fasting 103 mg/dL       Above high normal            [facility]                            

 

           Blood Urea Nitrogen 17 mg/dL   7-18       Normal (applies to non-nume

lyndsey results)            

[facility] ([facility])  

 

           Sodium Level 139 meq/L  136-145    Normal (applies to non-numeric res

ults)            [facility])         

 

             Creatinine For GFR 0.85 mg/dL   0.70-1.30    Normal (applies to non

-numeric results) 

                          [facility] ([facility])  

 

                Glomerular Filtration Rate Laboratory test result               

  Normal (applies to non-

numeric results)                        [facility]) 

 

 

                                        <content>Units are mL/min/1.73 

m2</content><br/><content></content><br/><content>Chronic Kidney Disease Staging
per NKF:</content><br/><content></content><br/><content>Stage I & II   GFR >=60 
     Normal to Mildly Decreased</content><br/><content>Stage III      GFR 30-59 
    Moderately Decreased</content><br/><content>Stage IV       GFR 15-29      
Severely Decreased</content><br/><content>Stage V        GFR <15        Very 
Little GFR Left</content><br/><content>ESRD           GFR <15 on 
RRT</content><br/><content></content> 

 

           Potassium Serum 4.1 meq/L  3.5-5.1    Normal (applies to non-numeric 

results)            

[facility] ([facility])  

 

           Chloride Level 109 meq/L       Above high normal            [facility]

[facility] ([facility])                            

 

           Carbon Dioxide Level 23 meq/L   21-32      Normal (applies to non-num

mynor results)            

[facility] ([facility])  

 

           Calcium Level 9.7 mg/dL  8.8-10.2   Normal (applies to non-numeric re

sults)            

[facility] ([facility])  

 

           Anion Gap  7 meq/L    8-16       Below low normal            [facility] (

[facility])                                      

 

           Alt/SGPT   31 U/L     12-78      Normal (applies to non-numeric resul

ts)            [facility]         

 

           Ast/Sgot   11 U/L     7-37       Normal (applies to non-numeric resul

ts)            [facility] ([facility])                    

 

           Alkaline Phosphatase 78 U/L          Normal (applies to non-num

mynor results)            

[facility] ([facility])  

 

           Bilirubin,Total 0.6 mg/dL  0.2-1.0    Normal (applies to non-numeric 

results)            

[facility])  

 

           Total Protein 7.3 GM/DL  6.4-8.2    Normal (applies to non-numeric re

sults)            [facility]

([facility])         

 

           Albumin    3.1 GM/DL  3.2-5.2    Below low normal            [facility] (

[facility])                                      

 

           Albumin/Globulin Ratio 0.7                   Normal (applies to non-n

umeric results)            [facility])         









                    ID                  Date                Data Source

 

                    [id number]         2021 10:35:00 AM EST [facility] ([facility])









          Name      Value     Range     Interpretation Code Description Data Mary

rce(s) Supporting 

Document(s)

 

           White Blood Count 10.2 10    4.0-10.0   Above high normal            

[facility] ([facility])                            

 

           Hemoglobin 14.9 g/dL  13.5-17.5  Normal (applies to non-numeric resul

ts)            [facility] 

([facility])         

 

           Hematocrit 43.3 %     42.0-52.0  Normal (applies to non-numeric resul

ts)            [facility])         

 

           Red Blood Count 4.60 10    4.30-6.10  Normal (applies to non-numeric 

results)            

[facility])  

 

                Mean Corpuscular Volume 94.1 fl         80.0-96.0       Normal (

applies to non-numeric 

results)                                [facility]) 

 

 

                Mean Corpuscular Hemoglobin 32.4 pg         27.0-33.0       Norm

al (applies to non-numeric 

results)                                [facility] ([facility]) 

 

 

                Red Cell Distribution Width 12.7 %          11.5-14.5       Norm

al (applies to non-numeric 

results)                                [facility] ([facility]) 

 

 

                Mean Corpuscular HGB Conc 34.4 g/dL       32.0-36.5       Normal

 (applies to non-numeric 

results)                                [facility] ([facility]) 

 

 

                Platelet Count, Automated 285 10          150-450         Normal

 (applies to non-numeric results)

                                        [facility] ([facility]) 

 

 

           Lymph %    8.1 %      24.0-44.0  Below low normal            [facility] (

[facility])                                      

 

           Neutrophils % 80.3 %     36.0-66.0  Above high normal            [facility]

[facility] ([facility])                            

 

           Eos %      0.5 %      0.0-3.0    Normal (applies to non-numeric resul

ts)            [facility] ([facility])                    

 

           Mono %     9.7 %      0.0-5.0    Above high normal            [facility] 

([facility])

                                         

 

           Baso %     0.7 %      0.0-1.0    Normal (applies to non-numeric resul

ts)            [facility]                    

 

           Immature Granulocyte % 0.7 %      0-3.0      Normal (applies to non-n

umeric results)            

[facility] ([facility])  

 

           Lymph #    0.8 10     1.5-5.0    Below low normal            [facility] (

[facility])                                      

 

           Nucleated Red Blood Cell % 0.0 %      0-0        Normal (applies to n

on-numeric results)            

[facility]  

 

           Neutrophils # 8.2 10     1.5-8.5    Normal (applies to non-numeric re

sults)            [facility] 

([facility])         

 

           Baso #     0.1 10     0.0-0.2    Normal (applies to non-numeric resul

ts)            [facility] 

[facility])         

 

           Eos #      0.1 10     0.0-0.5    Normal (applies to non-numeric resul

ts)            [facility])                    

 

           Mono #     1.0 10     0.0-0.8    Above high normal            [facility] 

([facility])                                      









                    ID                  Date                Data Source

 

                    [id number]         2020 11:25:00 AM EST [facility] ([facility])









          Name      Value     Range     Interpretation Code Description Data Mary

rce(s) Supporting 

Document(s)

 

           Prothrombin Time 12.9 s     12.5-14.3  Normal (applies to non-numeric

 results)            

[facility] ([facility])  

 

           Inr        0.95                  Normal (applies to non-numeric resul

ts)            [facility] ([facility])                            

 

                                        THERAPUTIC HUMAN INR VALUES

INDICATIONS                      NORMAL RANGES

PROPHYLAXIS/TREATMENT OF:

VENOUS THROMBOSIS                2.0-3.0

PULMONARY EMBOLISM               2.0-3.0

PREVENTION OF SYSTEMIC EMBOLISM FROM:

TISSUE HEART VALVES              2.0-3.0

ACUTE MYOCARDIAL INFARCTION      2.0-3.0

VALVULAR HEART DISEASE           2.0-3.0

ATRIAL FIBRILLATION              2.0-3.0

MECHANICAL VALVES(HIGH RISK)     2.5-3.5

RECURRENT MYOCARDIAL INFARCTION  2.5-3.5

 

 

                Partial Thromboplastin Time 32.8 s          24.2-38.5       Norm

al (applies to non-numeric 

results)                                [facility] ([facility]) 

 









                    ID                  Date                Data Source

 

                    [id number]         2020 11:25:00 AM EST [facility] ([facility])









          Name      Value     Range     Interpretation Code Description Data Mary

rce(s) Supporting 

Document(s)

 

                Platelets [#/volume] in Blood by Automated count 342 10         

 150-450         Normal (applies 

to non-numeric results)                     [facility] ([facility])  

 

           aPTT in Platelet poor plasma by Coagulation assay Laboratory test res

ult                                  

[facility])  









                    ID                  Date                Data Source

 

                    [id number]            10/16/2020 12:19:00 PM [facility]









          Name      Value     Range     Interpretation Code Description Data Mary

rce(s) Supporting 

Document(s)

 

           Laboratory test finding (navigational concept) 154 mg/dL       

                      [facility] 

([facility]          

 

           Laboratory test finding (navigational concept) 140 meq/L  136-145    

                      [facility] 

([facility]          

 

           Laboratory test finding (navigational concept) 65.0 %     38.0-51.0  

                      [facility] 

([facility]          

 

           Laboratory test finding (navigational concept) 104 meq/L       

                      [facility] 

([facility]          

 

           Laboratory test finding (navigational concept) 3.4 meq/L  3.5-5.1    

                      [facility] 

([facility]          

 

           Laboratory test finding (navigational concept) 5.0 mg/dL  4.5-5.3    

                      [facility] 

([facility]          

 

           Laboratory test finding (navigational concept) 19.0 MM/L  23.0-27.0  

                      [facility] 

([facility]          

 

           Laboratory test finding (navigational concept) 0.6 mg/dL  0.6-1.3    

                      [facility] 

([facility]          

 

           Laboratory test finding (navigational concept) 24 mg/dL   8-26       

                      [facility] ([facility]                 









                    ID                  Date                Data Source

 

                    [id number]            10/16/2020 12:05:00 PM EDT [facility] ([facility]









          Name      Value     Range     Interpretation Code Description Data Mary

rce(s) Supporting 

Document(s)

 

           Troponin I.cardiac [Mass/volume] in Serum or Plasma 0.00 ng/mL 0.00-0

.08                        

[facility] ([facility]   









                    ID                  Date                Data Source

 

                    [id number]            10/16/2020 11:55:00 AM EDT [facility] ([facility]









          Name      Value     Range     Interpretation Code Description Data Mary

rce(s) Supporting 

Document(s)

 

           Laboratory test finding (navigational concept) 1.04       0.4-2.0    

                      [facility] ([facility]                 









                    ID                  Date                Data Source

 

                    [id number]            10/16/2020 11:52:00 AM EDT [facility] ([facility]









          Name      Value     Range     Interpretation Code Description Data Mary

rce(s) Supporting 

Document(s)

 

           Thyrotropin [Units/volume] in Serum or Plasma 0.480 uIU/ML 0.358-3.74

0                       

[facility] ([facility]   

 

                          Natriuretic peptide.B prohormone N-Terminal [Mass/volu

me] in Serum or Plasma 612

 pg/mL                                              [facility] ([facility]  









                    ID                  Date                Data Source

 

                    [id number]            10/16/2020 11:52:00 AM EDT [facility] ([facility]









          Name      Value     Range     Interpretation Code Description Data Mary

rce(s) Supporting 

Document(s)

 

          Ast/Sgot  24 U/L    7-37                          [facility]  

 

          Bilirubin,Total 1.5 mg/dL 0.2-1.0                       [facility] ([facility]  

 

          Alkaline Phosphatase 105 U/L                           [facility] ([facility]  

 

          Alt/SGPT  35 U/L    12-78                         [facility]  

 

          Total Protein 6.0 GM/DL 6.4-8.2                       [facility] ([facility]  

 

          Bilirubin,Direct 0.9 mg/dL 0.0-0.2                       [facility] ([facility]  

 

          Albumin   2.3 GM/DL 3.2-5.2                       [facility]  

 

          Albumin/Globulin Ratio 0.6                                     [facility] 

([facility]  









                    ID                  Date                Data Source

 

                    [id number]            10/16/2020 11:52:00 AM EDT [facility] ([facility]









          Name      Value     Range     Interpretation Code Description Data Mary

rce(s) Supporting 

Document(s)

 

          Inr       1.13                                    [facility]  

 

                                        THERAPUTIC HUMAN INR VALUES

INDICATIONS                      NORMAL RANGES

PROPHYLAXIS/TREATMENT OF:

VENOUS THROMBOSIS                2.0-3.0

PULMONARY EMBOLISM               2.0-3.0

PREVENTION OF SYSTEMIC EMBOLISM FROM:

TISSUE HEART VALVES              2.0-3.0

ACUTE MYOCARDIAL INFARCTION      2.0-3.0

VALVULAR HEART DISEASE           2.0-3.0

ATRIAL FIBRILLATION              2.0-3.0

MECHANICAL VALVES(HIGH RISK)     2.5-3.5

RECURRENT MYOCARDIAL INFARCTION  2.5-3.5

 

 

          Prothrombin Time 14.7 s    12.5-14.3                     [facility] ([facility]  









                    ID                  Date                Data Source

 

                    [id number]            10/16/2020 11:52:00 AM EDT [facility] ([facility]









          Name      Value     Range     Interpretation Code Description Data Mary

rce(s) Supporting 

Document(s)

 

          White Blood Count 13.3 10   4.0-10.0                      [facility] ([facility]  

 

          Red Blood Count 4.01 10   4.30-6.10                     [facility] ([facility]  

 

          Hematocrit 37.4 %    42.0-52.0                     [facility]  

 

           Mean Corpuscular Hemoglobin 32.7 pg    27.0-33.0                     

   [facility] ([facility]                              

 

           Mean Corpuscular Volume 93.3 fl    80.0-96.0                        M

[facility] ([facility]                                    

 

          Hemoglobin 13.1 g/dL 13.5-17.5                     [facility]  

 

           Mean Corpuscular HGB Conc 35.0 g/dL  32.0-36.5                       

 [facility] ([facility]                              

 

           Red Cell Distribution Width 12.4 %     11.5-14.5                     

   [facility] ([facility]                              

 

          Lymph %   2.6 %     24.0-44.0                     [facility]  

 

           Platelet Count, Automated 344 10     150-450                         

 [facility]                                    

 

          Neutrophils % 86.7 %    36.0-66.0                     [facility] ([facility]  

 

          Mono %    9.4 %     0.0-5.0                       [facility]  

 

          Immature Granulocyte % 0.7 %     0-3.0                         [facility] 

([facility]  

 

          Eos %     0.2 %     0.0-3.0                       [facility]  

 

          Baso %    0.4 %     0.0-1.0                       [facility]  

 

          Nucleated Red Blood Cell % 0.0 %     0-0                           [facility] ([facility] 

 

 

          Neutrophils # 11.5 10   1.5-8.5                       [facility] ([facility]  

 

          Lymph #   0.3 10    1.5-5.0                       [facility] ([facility]  

 

          Mono #    1.2 10    0.0-0.8                       [facility]  

 

          Baso #    0.1 10    0.0-0.2                       [facility]  

 

          Eos #     0.0 10    0.0-0.5                       [facility]  









                    ID                  Date                Data Source

 

                    [id number]            10/16/2020 11:52:00 AM EDT [facility] ([facility]









          Name      Value     Range     Interpretation Code Description Data Mary

rce(s) Supporting 

Document(s)

 

           Venous Partial Pressure Co2 28.1 mmHg  38.0-50.0                     

   [facility] ([facility]                              

 

          Venous PH 7.476 units 7.330-7.430                     [facility] ([facility]  

 

          Venous Hco3 20.3 meq/L 23.0-27.0                     [facility] ([facility]  

 

           Venous Total Co2 21.1 meq/L 24.0-28.0                        [facility] (

[facility]

                                         

 

           Venous Partial Pressure O2 140.0 mmHg 30.0-50.0                      

  [facility] ([facility]                              

 

           Venous O2 Saturation 98.9 %     60.0-80.0                        [facility]

[facility] ([facility]

                                         

 

          Venous Standard Hco3 22.9 meq/L                               [facility] (

[facility]  

 

          Venous Base Excess -1.9                                    [facility] ([facility]  









                    ID                  Date                Data Source

 

                    [id number]            10/16/2020 11:37:00 AM EDT [facility] ([facility]









          Name      Value     Range     Interpretation Code Description Data Mary

rce(s) Supporting 

Document(s)

 

          Venous PH 7.476 units 7.330-7.430                     [facility] ([facility]  

 

           Venous Partial Pressure Co2 28.1 mmHg  38.0-50.0                     

   [facility] ([facility]                              

 

           Venous Partial Pressure O2 140.0 mmHg 30.0-50.0                      

  [facility] ([facility]                              

 

           Venous Total Co2 21.1 meq/L 24.0-28.0                        [facility] (

[facility]

                                         

 

          Venous Base Excess -1.9                                    [facility] ([facility]  

 

          Venous Standard Hco3 22.9 meq/L                               [facility] (

[facility]  

 

          Venous Hco3 20.3 meq/L 23.0-27.0                     [facility] ([facility]  

 

           Venous O2 Saturation 98.9 %     60.0-80.0                        [facility]

[facility] ([facility]

                                         









                    ID                  Date                Data Source

 

                    [id number]            2020 08:51:00 AM EDT [facility] ([facility]









          Name      Value     Range     Interpretation Code Description Data Mary

rce(s) Supporting 

Document(s)

 

          HDL Cholesterol 44 mg/dL                                [facility] ([facility]  

 

          Cholesterol Level 180 mg/dL                               [facility] ([facility]  

 

          Triglycerides Level 73 mg/dL                                [facility] ([facility]  

 

          Non-HDL-C 136 mg/dL                               [facility]  

 

          LDL Cholesterol 121 mg/dL                               [facility]  

 

          Cholesterol Risk Ratio 4.090                                   [facility] 

([facility]  









                    ID                  Date                Data Source

 

                    [id number]            2020 08:51:00 AM EDT [facility] ([facility]









          Name      Value     Range     Interpretation Code Description Data Mary

e(s) Supporting 

Document(s)

 

          Glucose, Fasting 101 mg/dL                         [facility] ([facility]  

 

           Creatinine For GFR 1.04 mg/dL 0.70-1.30                        [facility]

 ([facility]                                    

 

          Blood Urea Nitrogen 19 mg/dL  7-18                          [facility] ([facility]  

 

           Glomerular Filtration Rate Laboratory test result                    

              [facility] ([facility]                    

 

                                        <content>Units are mL/min/1.73 

m2</content><br/><content></content><br/><content>Chronic Kidney Disease Staging
 per NK[facility]:</content><br/><content></content><br/><content>Stage I & II   GFR >=60
       Normal to Mildly Decreased</content><br/><content>Stage III      GFR 30-
59      Moderately Decreased</content><br/><content>Stage IV       GFR 15-29    
  Severely Decreased</content><br/><content>Stage V        GFR <15        Very 
Little GFR Left</content><br/><content>ESRD           GFR <15 on 
RRT</content><br/><content></content> 

 

          Sodium Level 142 meq/L 136-145                       [facility]  

 

          Carbon Dioxide Level 24 meq/L  21-32                         [facility] ([facility]  

 

          Chloride Level 112 meq/L                         [facility]  

 

          Potassium Serum 4.1 meq/L 3.5-5.1                       [facility] ([facility]  

 

          Ast/Sgot  12 U/L    7-37                          [facility]  

 

          Calcium Level 8.8 mg/dL 8.8-10.2                      [facility] ([facility]  

 

          Anion Gap 6 meq/L   8-16                          [facility]  

 

          Alkaline Phosphatase 72 U/L                            [facility]  

 

          Alt/SGPT  20 U/L    12-78                         [facility]  

 

          Albumin   3.7 GM/DL 3.2-5.2                       [facility]  

 

          Bilirubin,Total 0.8 mg/dL 0.2-1.0                       [facility]  

 

          Total Protein 6.9 GM/DL 6.4-8.2                       [facility] ([facility]  

 

          Albumin/Globulin Ratio 1.2                                     [facility] 

([facility]  









                    ID                  Date                Data Source

 

                    [id number]            2020 08:51:00 AM EDT [facility] ([facility]









          Name      Value     Range     Interpretation Code Description Data Mary

rce(s) Supporting 

Document(s)

 

          White Blood Count 7.0 10    4.0-10.0                      [facility]  

 

          Hemoglobin 16.0 g/dL 13.5-17.5                     [facility]  

 

          Red Blood Count 4.71 10   4.30-6.10                     [facility]  

 

          Hematocrit 45.3 %    42.0-52.0                     [facility]  

 

           Mean Corpuscular Hemoglobin 34.0 pg    27.0-33.0                     

   [facility]                              

 

           Mean Corpuscular Volume 96.2 fl    80.0-96.0                        M

[facility] ([facility]                                    

 

           Red Cell Distribution Width 12.7 %     11.5-14.5                     

   [facility] ([facility]                              

 

           Mean Corpuscular HGB Conc 35.3 g/dL  32.0-36.5                       

 [facility]                              

 

          Lymph %   18.1 %    24.0-44.0                     [facility]  

 

           Platelet Count, Automated 219 10     150-450                         

 [facility]                                    

 

          Neutrophils % 68.0 %    36.0-66.0                     [facility]  

 

          Baso %    0.9 %     0.0-1.0                       [facility]  

 

          Eos %     1.6 %     0.0-3.0                       [facility]  

 

          Mono %    11.1 %    0.0-5.0                       [facility]  

 

          Nucleated Red Blood Cell % 0.0 %     0-0                           [facility] ([facility] 

 

 

          Immature Granulocyte % 0.3 %     0-3.0                         [facility] 

([facility]  

 

          Mono #    0.8 10    0.0-0.8                       [facility]  

 

          Neutrophils # 4.8 10    1.5-8.5                       [facility]  

 

          Lymph #   1.3 10    1.5-5.0                       [facility]  

 

          Eos #     0.1 10    0.0-0.5                       [facility]  

 

          Baso #    0.1 10    0.0-0.2                       [facility]  







                                        Procedure

 

                                          



                                                                                
                                                                                
                                                                                
                                                                                
                                                                                
                                                          



Social History

          



           Code       Duration   Value      Status     Description Data Source(s

)

 

             Smoking      2021 12:00:00 AM EST Patient is a former smoker 

completed    Patient 

is a former smoker                      [facility] ([facility])

 

                    Smoking             2020 12:00:00 AM EST - 10/01/2016 

12:00:00 AM EDT Patient is a 

former smoker       completed           Patient is a former smoker [facility]



                                                                                
                            



Vital Signs

          



                    ID                  Date                Data Source

 

                    UNK                                      









           Name       Value      Range      Interpretation Code Description Data

 Source(s)

 

           Body surface area Derived from formula 1.85 m2                       

   1.85 m2    [facility] ([facility])

 

           Body weight 68.494 kg                        68.494 kg  [facility] ([facility])

 

           Ideal body weight 166 [lb_av]                       166 [lb_av] [facility]

T ([facility])

 

           Body mass index (BMI) [Ratio] 21.7 kg/m2                       21.7 k

g/m2 [facility] ([facility])

 

           Body weight 151.00 [lb_av]                       151.00 [lb_av] [facility]

T ([facility])

 

           Body height 70 [in_i]                        70 [in_i]  [facility] ([facility])

 

                                        5'10" 

 

           Oxygen saturation in Arterial blood by Pulse oximetry 97 %           

                  97 %       [facility] 

([facility])

 

                                        Room Air 

 

           Heart rate 80 /min                          80 /min    [facility] ([facility])

 

           Diastolic blood pressure 80 mm[Hg]                        80 mm[Hg]  

[facility] ([facility])

 

           Systolic blood pressure 140 mm[Hg]                       140 mm[Hg] [facility] ([facility])

 

           Body surface area Derived from formula 1.86 m2                       

   1.86 m2    [facility] ([facility])

 

           Body weight 68.947 kg                        68.947 kg  [facility] ([facility])

 

           [facility]av]                       [facility] ([facility]

 

           Body mass index (BMI) [Ratio] 21.8 kg/m2                       21.8 k

g/m2 [facility] ([facility])

 

           Body weight 152.00 [lb_av]                       152.00 [facility]av] [facility]

[facility] ([facility]

 

           Body height 70 [in_i]                        70 [in_i]  [facility] ([facility])

 

                                        5'10" 

 

           Body temperature 96.0 [degF]                       96.0 [degF] [facility]

 ([facility])

 

           Oxygen saturation in Arterial blood by Pulse oximetry 96 %           

                  96 %       [facility] 

([facility])

 

           Heart rate 111 /min                         111 /min   [facility] ([facility])

 

           Diastolic blood pressure 90 mm[Hg]                        90 mm[Hg]  

[facility] ([facility])

 

           Systolic blood pressure 140 mm[Hg]                       140 mm[Hg] [facility] ([facility])

 

           Body mass index (BMI) [Ratio] 22.0 kg/m2                       22.0 k

g/m2 [facility] ([facility]

 

           Ideal body weight 166 [lb_av]                       166 [lb_av] [facility]

[facility] ([facility]

 

           Oxygen saturation in Arterial blood by Pulse oximetry 98 %           

                  98 %       [facility] 

([facility]

 

           Body weight 153.38 [lb_av]                       153.38 [lb_av] [facility]

[facility] ([facility]

 

           Body height 70 [in_i]                        70 [in_i]  [facility] ([facility]

 

                                        5'10" 

 

           Respiratory rate 18 /min                          18 /min    [facility] (

[facility]

 

           Body temperature 97.3 [degF]                       97.3 [degF] [facility]

 ([facility]

 

           Heart rate 88 /min                          88 /min    [facility] ([facility]

 

           Diastolic blood pressure 85 mm[Hg]                        85 mm[Hg]  

[facility] ([facility]

 

                                        recheck 

 

           Systolic blood pressure 149 mm[Hg]                       149 mm[Hg] M

[facility] ([facility]

 

                                        recheck 

 

           Diastolic blood pressure 85 mm[Hg]                        85 mm[Hg]  

[facility] ([facility]

 

           Systolic blood pressure 153 mm[Hg]                       153 mm[Hg] M

[facility] ([facility]

 

           Body surface area Derived from formula 1.86 m2                       

   1.86 m2    [facility]

 

           Body weight 68.947 kg                        68.947 kg  [facility] ([facility])

 

           Ideal body weight 166 [lb_av]                       166 [lb_av] [facility]

T ([facility])

 

           Body mass index (BMI) [Ratio] 21.8 kg/m2                       21.8 k

g/m2 [facility] ([facility])

 

           Body weight 152.00 [lb_av]                       152.00 [lb_av] [facility]

T ([facility])

 

           Body height 70 [in_i]                        70 [in_i]  [facility] ([facility], )

 

                                        5'10" 

 

           Oxygen saturation in Arterial blood by Pulse oximetry 94 %           

                  94 %       [facility] 

([facility], )

 

                                        Room Air 

 

           Heart rate 94 /min                          94 /min    [facility] ([facility])

 

           Diastolic blood pressure 80 mm[Hg]                        80 mm[Hg]  

[facility]

 

           Systolic blood pressure 136 mm[Hg]                       136 mm[Hg] 

[facility]

 

           Body mass index (BMI) [Ratio] 21.5 kg/m2                       21.5 k

g/m2 [facility] ([facility]

 

           Ideal body weight 166 [lb_av]                       166 [lb_av] [facility]

T ([facility]

 

           Oxygen saturation in Arterial blood by Pulse oximetry 97 %           

                  97 %       [facility] 

([facility]

 

           Body weight 150.12 [lb_av]                       150.12 [lb_av] [facility]

T ([facility]

 

           Body height 70 [in_i]                        70 [in_i]  [facility] ([facility]

 

                                        5'10" 

 

           Respiratory rate 20 /min                          20 /min    [facility] (

[facility]

 

           Body temperature 96.6 [degF]                       96.6 [degF] [facility]

 ([facility]

 

           Heart rate 111 /min                         111 /min   [facility] ([facility]

 

           Diastolic blood pressure 81 mm[Hg]                        81 mm[Hg]  

[facility] ([facility]

 

           Systolic blood pressure 124 mm[Hg]                       124 mm[Hg] [facility] ([facility]

 

           Diastolic blood pressure 82 mm[Hg]                        82 mm[Hg]  

[facility] ([facility]

 

           Systolic blood pressure 150 mm[Hg]                       150 mm[Hg] [facility] ([facility]

 

           Oxygen saturation in Arterial blood by Pulse oximetry 97 %           

                  97 %       [facility] 

([facility]

 

           Body weight 159.19 [lb_av]                       159.19 [lb_av] [facility]

T ([facility]

 

           Respiratory rate 16 /min                          16 /min    [facility] (

[facility]

 

           Body temperature 95.5 [degF]                       95.5 [degF] [facility]

 ([facility]

 

           Heart rate 81 /min                          81 /min    [facility] ([facility]

 

           Diastolic blood pressure 89 mm[Hg]                        89 mm[Hg]  

[facility] ([facility]

 

           Systolic blood pressure 149 mm[Hg]                       149 mm[Hg] M

[facility] ([facility]

 

           Diastolic blood pressure 82 mm[Hg]                        82 mm[Hg]  

[facility] ([facility]

 

           Systolic blood pressure 143 mm[Hg]                       143 mm[Hg] M

[facility] ([facility]

 

           Body temperature 97.6 [degF]                       97.6 [degF] [facility]

 ([facility]

## 2021-02-10 NOTE — CCD
Summarization Of Episode

                             Created on: 02/10/2021



MONSERRAT BARRON

External Reference #: [id number]

: 1955

Sex: Male



Demographics





                          Address                   [address] [id number]

 

                          Home Phone                [phone]

 

                          Preferred Language        English

 

                          Marital Status            

 

                          Evangelical Affiliation     NONE

 

                          Race                      White

 

                          Ethnic Group              Not  or 





Author





                          Author                    [facility]

 

                          Organization              [facility]

 

                          Address                   Unknown

 

                          Phone                     Unavailable







Support





                Name            Relationship    Address         Phone

 

                RE              Next Of Kin     Unknown         Unavailable

 

                    [facility] [facility] [place] [address]                    [phone]

 

                    [facility]              Next Of Kin         [address] [place]  [address]                    [phone]

 

                    SHAHANA ([facility]) ANITA Next Of Kin         [address] [place]  [address]                    [phone]

 

                    Anita Barron ECON                [address] [place]  [address]                    [phone]







Care Team Providers





                    Care Team Member Name Role                Phone

 

                    SKYLAR Andre MD Unavailable         Unavailable

 

                    Thai, SKYLAR Fisher MD Unavailable         Unavailable

 

                    Thai, SKYLAR Fisher MD Unavailable         Unavailable

 

                    Thai, SKYLAR Fisher MD Unavailable         Unavailable

 

                    Thai, SKYLAR Fisher MD Unavailable         Unavailable

 

                    Thai, SKYLAR Fisher MD Unavailable         Unavailable

 

                    Thai, SKYLAR Fisher MD Unavailable         Unavailable

 

                    Thai, SKYLAR Fisher MD Unavailable         Unavailable

 

                    Thai, SKYLAR Fisher MD Unavailable         Unavailable

 

                    Thai, SKYLAR Fisher MD Unavailable         Unavailable

 

                    Thai, SKYLAR Fisher MD Unavailable         Unavailable

 

                    Thai, SKYLAR Fisher MD Unavailable         Unavailable

 

                    Thai, SKYLAR Fisher MD Unavailable         Unavailable

 

                    Thai, SKYLAR Fisher MD Unavailable         Unavailable

 

                    Thai, SKYLAR Fisher MD Unavailable         Unavailable

 

                    Thai, SKYLAR Fisher MD Unavailable         Unavailable

 

                    Thai, SKYLAR Fisher MD Unavailable         Unavailable

 

                    Thai, SKYLAR Fisher MD Unavailable         Unavailable

 

                    Thai, SKYLAR Fisher MD Unavailable         Unavailable

 

                    Thai, SKYLAR Fisher MD Unavailable         Unavailable

 

                    Thai, SKYLAR Fisher MD Unavailable         Unavailable

 

                    Thai, SKYLAR Fisher MD Unavailable         Unavailable

 

                    Thai, SKYLAR Fisher MD Unavailable         Unavailable

 

                    Thai, SKYLAR Fisher MD Unavailable         Unavailable

 

                    Thai, SKYLAR Fisher MD Unavailable         Unavailable

 

                    Thai, SKYLAR Fisher MD Unavailable         Unavailable

 

                    Thai, SKYLAR Fisher MD Unavailable         Unavailable

 

                    Thai, SKYLAR Fisher MD Unavailable         Unavailable

 

                    Thai, SKYLAR Fisher MD Unavailable         Unavailable

 

                    Thai, SKYALR Fisher MD Unavailable         Unavailable

 

                    Thai, SKYLAR Fisher MD Unavailable         Unavailable

 

                    Thai, SKYLAR Fisher MD Unavailable         Unavailable

 

                    Thai, SKYLAR Fisher MD Unavailable         Unavailable

 

                    Thai, SKYLAR Fisher MD Unavailable         Unavailable

 

                    Thai, SKYLAR Fisher MD Unavailable         Unavailable

 

                    Thai, SKYLAR Fisher MD Unavailable         Unavailable

 

                    Thai, SKYLAR Fisher MD Unavailable         Unavailable

 

                    Htai, SKYLAR Fisher MD Unavailable         Unavailable

 

                    Thai, SKYLAR Fisher MD Unavailable         Unavailable

 

                    Thai, SKYLAR Fisher MD Unavailable         Unavailable

 

                    Thai, SKYLAR Fisher MD Unavailable         Unavailable

 

                    Thai, SKYLAR Fisher MD Unavailable         Unavailable

 

                    Thai, SKYLAR Fisher MD Unavailable         Unavailable

 

                    Thai, SKYLAR Fisher MD Unavailable         Unavailable

 

                    Thai, SKYLAR Fisher MD Unavailable         Unavailable

 

                    Thai, SKYLAR Fisher MD Unavailable         Unavailable

 

                    Thai, SKYLAR Fisher MD Unavailable         Unavailable

 

                    Thai, SKYLAR Fisher MD Unavailable         Unavailable

 

                    Thai, SKYLAR Fisher MD Unavailable         Unavailable

 

                    Thai, SKYLAR Fisher MD Unavailable         Unavailable

 

                    Thai, SKYLAR Fisher MD Unavailable         Unavailable

 

                    Thai, SKYLAR Fisher MD Unavailable         Unavailable

 

                    Thai, SKYLAR Fisher MD Unavailable         Unavailable

 

                    Thai, SKYLAR Fisher MD Unavailable         Unavailable

 

                    Thai, SKYLAR Fisher MD Unavailable         Unavailable

 

                    Thai, SKYLAR Fisher MD Unavailable         Unavailable

 

                    Thai, SKYLAR Fisher MD Unavailable         Unavailable

 

                    Thai, SKYLAR Fisher MD Unavailable         Unavailable

 

                    Thai, SKYLAR Fisher MD Unavailable         Unavailable

 

                    Thai, SKYLAR Fisher MD Unavailable         Unavailable

 

                    Thai, SKYLAR Fisher MD Unavailable         Unavailable

 

                    Thai, SKYLAR Fisher MD Unavailable         Unavailable

 

                    Thai, SKYLAR Fisher MD Unavailable         Unavailable

 

                    Thai, SKYLAR Fisher MD Unavailable         Unavailable

 

                    Thai, SKYLAR Fisher MD Unavailable         Unavailable

 

                    Thai, SKYLAR Fisher MD Unavailable         Unavailable

 

                    Thai, SKYLAR Fisher MD Unavailable         Unavailable

 

                    Thai, SKYLAR Fisher MD Unavailable         Unavailable

 

                    Thai, SKYLAR Fisher MD Unavailable         Unavailable

 

                    Thai, SKYLAR Fisher MD Unavailable         Unavailable

 

                    Thai, SKYLAR Fisher MD Unavailable         Unavailable

 

                    Thai, SKYLAR Fisher MD Unavailable         Unavailable

 

                    Thai, SKYLAR Fisher MD Unavailable         Unavailable

 

                    Thai, SKYLAR Fisher MD Unavailable         Unavailable

 

                    Thai, SKYLAR Fisher MD Unavailable         Unavailable

 

                    Scordo, M Melanie PA Unavailable         Unavailable

 

                    Scordo, M Melanie PA Unavailable         Unavailable

 

                    Scordo, M Melanie PA Unavailable         Unavailable

 

                    Scordo, M Melanie PA Unavailable         Unavailable

 

                    Scordo, M Melanie PA Unavailable         Unavailable

 

                    Scordo, M Melanie PA Unavailable         Unavailable

 

                    Scordo, M Melanie PA Unavailable         Unavailable

 

                    Scordo, M Melanie PA Unavailable         Unavailable

 

                    Scordo, M Melanie PA Unavailable         Unavailable

 

                    Scordo, M Melanie PA Unavailable         Unavailable

 

                    Scordo, M Melanie PA Unavailable         Unavailable

 

                    Scordo, M Melanie PA Unavailable         Unavailable

 

                    Scordo, M Melanie PA Unavailable         Unavailable

 

                    Scordo, M Melanie PA Unavailable         Unavailable

 

                    Scordo, M Melanie PA Unavailable         Unavailable

 

                    Scordo, M Melanie PA Unavailable         Unavailable

 

                    Scordo, M Melanie PA Unavailable         Unavailable

 

                    Scordo, M Melanie PA Unavailable         Unavailable

 

                    Scordo, M Melanie PA Unavailable         Unavailable

 

                    Scordo, M Melanie PA Unavailable         Unavailable

 

                    Scordo, M Melanie PA Unavailable         Unavailable

 

                    Scordo, M Melanie PA Unavailable         Unavailable

 

                    Scordo, M Melanie PA Unavailable         Unavailable

 

                    Scordo, M Melanie PA Unavailable         Unavailable

 

                    Scordo, M Melanie PA Unavailable         Unavailable

 

                    Scordo, M Melanie PA Unavailable         Unavailable

 

                    Scordo, M Melanie PA Unavailable         Unavailable

 

                    Scordo, M Melanie PA Unavailable         Unavailable

 

                    Scordo, M Melanie PA Unavailable         Unavailable

 

                    Scordo, M Melanie PA Unavailable         Unavailable

 

                    Scordo, M Melanie PA Unavailable         Unavailable

 

                    Scordo, M Melanie PA Unavailable         Unavailable

 

                    Scordo, M Melanie PA Unavailable         Unavailable

 

                    Scordo, M Melanie PA Unavailable         Unavailable

 

                    Scordo, M Melanie PA Unavailable         Unavailable

 

                    Scordo, M Melanie PA Unavailable         Unavailable

 

                    Scordo, M Melanie PA Unavailable         Unavailable

 

                    Scordo, M Melanie PA Unavailable         Unavailable

 

                    Scordo, M Melanie PA Unavailable         Unavailable

 

                    Scordo, M Melanie PA Unavailable         Unavailable

 

                    Scordo, M Melanie PA Unavailable         Unavailable

 

                    Scordo, M Melanie PA Unavailable         Unavailable

 

                    THEA Fagan     Unavailable         Unavailable

 

                    Kris Echeverria MD Unavailable         Unavailable

 

                    Kris Echeverria MD Unavailable         Unavailable

 

                    Kris Echeverria MD Unavailable         Unavailable

 

                    Kris Echeverria MD Unavailable         Unavailable

 

                    Kris Echeverria MD Unavailable         Unavailable

 

                    Kris Echeverria MD Unavailable         Unavailable

 

                    Kris Echeverria MD Unavailable         Unavailable

 

                    Kris Echeverria MD Unavailable         Unavailable

 

                    Kris Echeverria MD Unavailable         Unavailable

 

                    Kris Echeverria MD Unavailable         Unavailable

 

                    Kris Echeverria MD Unavailable         Unavailable

 

                    Kris Echeverria MD Unavailable         Unavailable

 

                    Kris Echeverria MD Unavailable         Unavailable

 

                    Kris Echeverria MD Unavailable         Unavailable

 

                    Kris Echeverria MD Unavailable         Unavailable

 

                    Kris Echeverria MD Unavailable         Unavailable

 

                    Kris Echeverria MD Unavailable         Unavailable

 

                    Kris Echeverria MD Unavailable         Unavailable

 

                    Kris Echeverria MD Unavailable         Unavailable

 

                    Kris Echeverria MD Unavailable         Unavailable

 

                    Kris Echeverria MD Unavailable         Unavailable

 

                    Kris Echeverria MD Unavailable         Unavailable

 

                    Kris Echeverria MD Unavailable         Unavailable

 

                    Kris Echeverria MD Unavailable         Unavailable

 

                    Kris Echeverria MD Unavailable         Unavailable

 

                    Kris Echeverria MD Unavailable         Unavailable

 

                    Kris Echeverria MD Unavailable         Unavailable

 

                    Kris Echeverria MD Unavailable         Unavailable

 

                    Kris Echeverria MD Unavailable         Unavailable

 

                    Kris Echeverria MD Unavailable         Unavailable

 

                    Kris Echeverria MD Unavailable         Unavailable

 

                    Kris Echeverria MD Unavailable         Unavailable

 

                    Kris Echeverria MD Unavailable         Unavailable

 

                    Kris Echeverria MD Unavailable         Unavailable

 

                    Echeverria, Kris aPng MD Unavailable         Unavailable

 

                    Echeverria, Kris Pang MD Unavailable         Unavailable

 

                    Echeverria, Kris Pang MD Unavailable         Unavailable

 

                    Echeverria, Kris Pang MD Unavailable         Unavailable

 

                    Echeverria, Kris Pang MD Unavailable         Unavailable

 

                    Echeverria, Kris Pang MD Unavailable         Unavailable

 

                    Echeverria, Kris Pang MD Unavailable         Unavailable

 

                    Echeverria, Kris Pang MD Unavailable         Unavailable

 

                    Echeverria, Kris Pang MD Unavailable         Unavailable

 

                    Echeverria, Kris Pang MD Unavailable         Unavailable

 

                    Echeverria, Kris Pang MD Unavailable         Unavailable

 

                    Echeverria, Kris Pang MD Unavailable         Unavailable

 

                    Echeverria, Kris Pang MD Unavailable         Unavailable

 

                    Echeverria, Kris Pang MD Unavailable         Unavailable

 

                    Echeverria, Kris Pang MD Unavailable         Unavailable

 

                    Echeverria, Kris Pang MD Unavailable         Unavailable

 

                    Echeverria, Kris Pang MD Unavailable         Unavailable



                                  



Re-disclosure Warning

          The records that you are about to access may contain information from 
federally-assisted alcohol or drug abuse programs. If such information is 
present, then the following federally mandated warning applies: This information
has been disclosed to you from records protected by federal confidentiality 
rules (42 CFR part 2). The federal rules prohibit you from making any further 
disclosure of this information unless further disclosure is expressly permitted 
by the written consent of the person to whom it pertains or as otherwise 
permitted by 42 CFR part 2. A general authorization for the release of medical 
or other information is NOT sufficient for this purpose. The Federal rules 
restrict any use of the information to criminally investigate or prosecute any 
alcohol or drug abuse patient.The records that you are about to access may 
contain highly sensitive health information, the redisclosure of which is 
protected by Article 27-F of the [facility] Public Health law. If you 
continue you may have access to information: Regarding HIV / AIDS; Provided by 
facilities licensed or operated by the [facility] Office of Mental Health; 
or Provided by the [facility] Office for People With Developmental 
Disabilities. If such information is present, then the following [place] 
mandated warning applies: This information has been disclosed to you from 
confidential records which are protected by state law. State law prohibits you 
from making any further disclosure of this information without the specific 
written consent of the person to whom it pertains, or as otherwise permitted by 
law. Any unauthorized further disclosure in violation of state law may result in
a fine or alf sentence or both. A general authorization for the release of 
medical or other information is NOT sufficient authorization for further disc
losure.                                                                         
    



Family History

          



             Family Member Name Family Member Gender Family Member Status Date o

f Status 

Description                             Data Source(s)

 

           Unknown    Unknown    Problem                          [facility] ([facility]

 

           Unknown    Unknown    Problem                          [facility] ([facility]

 

           Unknown    Male       Problem                          [facility] ([facility]

 

           Unknown    Unknown                                      



                                                                                
                                     



Encounters

          



           Encounter  Providers  Location   Date       Indications Data Source(s

)

 

             Outpatient   Referrer: Cory Fagan              2021 08:51:0

0 AM EST Solitary 

pulmonary nodule                        [facility]

 

                                        Solitary pulmonary nodule 

 

                Office Visit    Attender: Bird Munguia/[place]/Trace/R

eindl 2021 

10:30:00 AM EST                                     [facility] ([facility]

[facility], )

 

                Outpatient      Attender: Bird Munguia/[place]/Trace/R

eindl 2020 

10:30:00 AM EST                                     [facility] ([facility]

[facility], )

 

           Outpatient Attender: Natalia Andre MD Main Office 2020 10:45:0

0 AM EST            

[facility] ([facility]

 

                [facility]                 [address] [place] [address] 

2020 12:00:00 AM EST                           eCW1 ([facility])

 

                Office Visit    Attender: Bird Munguia/[place]/Trace/R

eindl 2020 

12:45:00 PM EST                                     [facility] ([facility]

actice, )

 

           Outpatient Attender: Natalia Andre MD Main Office 10/27/2020 09:00:0

0 AM EDT            

[facility] ([facility]

 

           Outpatient Attender: Melanie SALAS Main Office 2020 09:40:00

 AM EDT            

[facility] ([facility]



                                                                                
                                                                             



Immunizations

          



             Vaccine      Date         Status       Description  Data Source(s)

 

             New in .  IIV4 10/18/2020 09:00:00 AM EDT completed            

     [facility] ([facility]

 

             pneumococcal polysaccharide PPV23 2020 07:16:00 AM EDT comple

familia                 [facility] 

([facility]



                                                                                
                 



Medications

          



          Medication Brand Name Start Date Product Form Dose      Route     Admi

nistrative 

Instructions Pharmacy Instructions Status     Indications Reaction   Description

 Data 

Source(s)

 

           Cefuroxime 500 MG Oral Tablet Cefuroxime Axetil 2021 12:00:00 A

M EST                       

ORAL                          completed                               [facility] ([facility])

 

           Cefuroxime 500 MG Oral Tablet Cefuroxime Axetil 2020 12:00:00 A

M EST                       

ORAL                          completed                               [facility] ([facility])

 

                    doxycycline hyclate 100 MG Oral Tablet Doxycycline Hyclate 1

 12:00:00 

AM EDT               ORAL                 completed                      [facility] 

([facility]

 

           Cefuroxime 500 MG Oral Tablet [Ceftin] Ceftin     10/19/2020 12:00:00

 AM EDT                       

ORAL                          completed                               [facility] ([facility]

 

           Levofloxacin 500 MG Oral Tablet Levofloxacin 10/12/2020 12:00:00 AM E

DT                       ORAL

                              completed                               [facility] ([facility])



                                                                                
                            



Insurance Providers

          



             Payer name   Policy type / Coverage type Policy ID    Covered party

 ID Covered 

party's relationship to cheek Policy Cheek             Plan Information

 

          MEDICARE BLUE PPO      [id number]           [id number]           SP        

          [id number]

 

          [employer] BLUE PPO G         [id number]           Self      

          [id number]

 

          [employer] BLUE PPO G         [id number]           Self      

          [id number]

 

          [employer] B         [id number]           S                   [id number]

[id number]

 

          MEDICARE            [id number]           SP                  [id number]

[id number]

 

          [facility]           [id number]           SP                  [id number]

[id number]

 

          MEDICARE BLUE PPO      306           [id number]           SP         

         [id number]

 

          MEDICARE BLUE PPO      306           [id number]           SP         

         [id number]

 

          [employer] Medicare Commercial [id number]           Self                [id number]

[id number]

 

          [facility]       Health Maintenance Organization (HMO) [id number]           

Self                [id number]

 

          Medicare Blue Ppo Commercial [id number]           Self              

  [id number]

 

          [employer]BS Medicare Commercial [id number]           Self                [id number]

[id number]

 

                    ANSI-Commercial [id number]        

                       

[id number]

 

                    ANSI-Not a Secondary Insurance [id number]

[id number]                               

[id number]

 

                    ANSI-Commercial [id number]        

                       

[id number]

 

                    ANSI-Not a Secondary Insurance [id number]

[id number]                               

[id number]

 

          [facility]           [id number]           SP                  [id number]

[id number]

 

                    ANSI-Commercial [id number]        

                       

[id number]

 

                    ANSI-Not a Secondary Insurance [id number]

[id number]                               

[id number]

 

          [facility] Health Care Commercial [id number]           Self                [id number]

[id number]

 

          [facility] Health Care Commercial [id number]           Self                [id number]

[id number]

 

          [facility] Commercial [id number]           Self                8

[id number]

 

          [facility]       Health Maintenance Organization (HMO) [id number]           

Self                [id number]

 

          [facility]           [id number]           SP                  82

[id number]

 

          [facility] C[facility] O         [id number]           S                   82

[id number]

 

          [facility] Commercial [id number]           Self                8

[id number]

 

          [facility]           [id number]           SP                  82

[id number]

 

          [facility] [id number]           Self                8

[id number]

 

          [facility]  Commercial [id number]           Self                [id number]

[id number]

 

          [facility]       Health Maintenance Organization (HMO) [id number]           

Self                [id number]

 

          [facility]           [id number]           SP                  82

[id number]

 

          [facility]           [id number]           SP                  82

[id number]

 

          [employer] [place] [employer]           [id number]           SP       

           [id number]

 

          [facility] C[facility] O         [id number]           S                   82

[id number]

 

          BLUE CROSS           [id number]           S                   [id number]

[id number]

 

          [employer]         [id number]           S                   [id number]

[id number]

 

          [employer]                     Se

lf                 

 

          [employer] [facility] Health Maintenance Organization ([facility])           

          Self                 

 

          [employer]                     Se

lf                 

 

          [employer]                     Se

lf                 

 

          [employer]         [id number]           S                   [id number]

[id number]

 

          [employer] [place]           [id number]           SP       

           [id number]

 

          [employer]                     Se

lf                 

 

          BS Of [facility] ([facility])           

          Self                 

 

          [employer]                     Se

lf                 

 

          [employer]                     Se

lf                 

 

          [employer]                     Se

lf                 

 

          [employer]                     Se

lf                 

 

          [employer]                     Se

lf                 

 

          [employer]                     Se

lf                 

 

          [employer]                     Se

lf                 

 

          [employer]                     Se

lf                 

 

          [employer]                     Se

lf                 

 

          [employer]                     Se

lf                 

 

                    O         UNAVAILABLE                               UNAVAILA

BLE

 

          [employer]                     Se

lf                 

 

          [employer]                     Se

lf                 

 

          [employer]                     Se

lf                 

 

          [employer] Of [employer] [facility] Part [employer]                     Self      

           

 

          [employer] Commercial                     Self          

       

 

          [employer] [place] [address]           [id number]           SP       

           [id number]

 

                              [id number]                               [id number]

[id number]



                                                                                
                                                                                
                                                                                
                                                                                
                                                                                
                                                                                
                                                                                
                                                                                
                                                          



Problems, Conditions, and Diagnoses

          



           Code       Display Name Description Problem Type Effective Dates Data

 Source(s)

 

           L57.0      [id number]  Actinic keratoses Problem    2019 12:00:0

0 AM EST eCW1 

([facility])

 

             R91.1        Solitary pulmonary nodule Solitary pulmonary nodule Di

agnosis    2021 

08:51:00 AM EST                         [facility]



                                                                                
                            



Surgeries/Procedures

          



             Procedure    Description  Date         Indications  Data Source(s)

 

             CT GUIDANCE NEEDLE PLACEMENT              2021 12:00:00 AM ES

T              [facility] ([facility], )

 

             DEMO&/EVAL OF PT UTILIZ AERSL GEN/NEB/INHLR/IPPB               12:00:00 AM EST              

[facility] ([facility], )

 

             Spirometry                2020 12:00:00 AM EST              M

[facility] ([facility], 

)



                                                                                
                            



Results

          



                    ID                  Date                Data Source

 

                    [id number]            2021 11:50:00 AM EST [facility] ([facility]









          Name      Value     Range     Interpretation Code Description Data Mary

rce(s) Supporting 

Document(s)

 

                          Bacteria identified in Unspecified specimen by Anaerob

e culture Laboratory test 

result                                              [facility] ([facility]  

 

                                        ****************************************

*********

If anaerobic or aerobic growth is detected within

the next 7-21 days, an addendum will follow.

                                        .***************************************

********.



FULL REPORT IN LAB NOTES (eCW and [facility]).

NO GROWTH ANAEROBICALLY



 









                    ID                  Date                Data Source

 

                    [id number]            2021 11:50:00 AM EST [facility] ([facility]









          Name      Value     Range     Interpretation Code Description Data Mary

rce(s) Supporting 

Document(s)

 

          Gram Stain Laboratory test result                               [facility]

 ([facility]  

 

                                        MANY WBCS

MANY RBCS

FEW GRAM POSITIVE COCCI IN PAIRS AND CHAINS



 

 

           Body Fluid Culture Laboratory test result                            

      [facility] ([facility]                                   

 

                                        <content>FULL REPORT IN LAB NOTES (eCW a

nd 

[facility]).</content><br/><content></content><br/><content>ORGANISM 1: STREP 
CONSTELLATUS SPP PHARYNG</content><br/><content></content><br/><content>QUANTITY
 OF GROWTH            
HEAVY</content><br/><content></content><br/><content></content><br/><content>ORG

ANISM 1: STREP CONSTELLATUS SPP 
PHARYNG</content><br/><content></content><br/><content>STREP CONSTELLATUS SPP 
PHARYNG:                    REACTION</content><br/><content>TETRACYCLINE        
               PO         250 mg qid 0.5      S</content><br/><content>
PENICILLIN G                       IV         1 mu  q6H 0.5      
I</content><br/><content>PENICILLIN G                       IV         1 mu  q6h
 0.5      I</content><br/><content>PENICILLIN G                       PO        
 250mg q6h fasting 0.5      I</content><br/><content>AMPICILLIN                 
        IV         500mg q6h <=0.25      S</content><br/><content>AMPICILLIN    
                     PO         500mg q6h fasting <=0.25      
S</content><br/><content>ERYTHROMYCIN                       IV         500mg q6h
 <=0.12      S</content><br/><content>ERYTHROMYCIN                       PO     
    500mg q6h <=0.12      S</content><br/><content>CLINDAMYCIN                  
      IV         600mg q6h 0.5      I</content><br/><content>CLINDAMYCIN        
                PO         150mg q6h 0.5      
I</content><br/><content>LEVOFLOXACIN                       IV         500mg qd 
0.5      S</content><br/><content>LEVOFLOXACIN                       PO         
250mg qd 0.5      S</content><br/><content>LEVOFLOXACIN                       PO
         500mg qd 0.5      S</content><br/><content>VANCOMYCIN                  
       IV         500mg q8h 0.5      S</content><br/><content>MOXIFLOXACIN 
(AVELOX)              IV         400MG QD 0.12      S</content><br/><content>
MOXIFLOXACIN (AVELOX)              PO         400MG QD 0.12      
S</content><br/><content>CEFTRIAXONE                        IV         1gm q24h 
<=0.12      S</content><br/><content>CEFOTAXIME                         IV      
   1gm  q8h <=0.12      S</content><br/><content></content> 









                    ID                  Date                Data Source

 

                    [id number]            2021 11:50:00 AM EST [facility] ([facility]









          Name      Value     Range     Interpretation Code Description Data Mary

rce(s) Supporting 

Document(s)

 

           PH Body Fluid Laboratory test result                                 

 [facility]                                    

 

                                        CLOTTED, UNABLE TO PERFORM TESTING

 

 

           Source, Body Fluid pH Laboratory test result                         

         [facility] ([facility]                              









                    ID                  Date                Data Source

 

                    [id number]            2021 11:50:00 AM EST [facility] ([facility]









          Name      Value     Range     Interpretation Code Description Data Mary

rce(s) Supporting 

Document(s)

 

          Amylase, Body Fluid 42 U/L                                  [facility] ([facility]  

 

           Source, Body Fluid Amylase Laboratory test result                    

              [facility] ([facility]                    









                    ID                  Date                Data Source

 

                    [id number]            2021 11:50:00 AM EST [facility] ([facility]









          Name      Value     Range     Interpretation Code Description Data Mary

rce(s) Supporting 

Document(s)

 

           Source, Body Fluid Glucose Laboratory test result                    

              [facility] ([facility]                    

 

          Glucose, Body Fluid 18 mg/dL                                [facility] ([facility]  









                    ID                  Date                Data Source

 

                    [id number]            2021 11:50:00 AM EST [facility] ([facility]









          Name      Value     Range     Interpretation Code Description Data Mary

rce(s) Supporting 

Document(s)

 

           Source, Body Fluid LDH Laboratory test result                        

          [facility] ([facility]                              

 

          LDH, Body Fluid 17737 U/L                               [facility] ([facility]  









                    ID                  Date                Data Source

 

                    [id number]            2021 11:50:00 AM EST [facility] ([facility]









          Name      Value     Range     Interpretation Code Description [facility] Supporting 

Document(s)

 

           Source, Body Fluid Tot Protein Laboratory test result                

                  [facility] ([facility]                    

 

          Total Protein, Body Fluid 5.2 g/dL                                [facility]

[facility] ([facility] 

 









                    ID                  Date                Data Source

 

                    [id number]            2021 11:50:00 AM EST [facility] ([facility]









          Name      Value     Range     Interpretation Code Description Data [facility] Supporting 

Document(s)

 

           Source, Body Fluid Laboratory test result                            

      [facility]                                   

 

           Appearance, Body Fluid Laboratory test result                        

          [facility] ([facility]                              

 

           Pleural FL Color Laboratory test result                              

    [facility]                                    

 

           WBC Body Fluid Laboratory test result 0-10                           

  [facility]                                    

 

                                        CLOTTED, UNABLE TO PERFORM TESTING

 

 

           RBC Body Fluid Laboratory test result                                

  [facility]                                    

 

                                        CLOTTED, UNABLE TO PERFORM TESTING

 









                    ID                  Date                Data Source

 

                    [id number]            2021 11:50:00 AM EST [facility] ([facility]









          Name      Value     Range     Interpretation Code Description Data [facility] Supporting 

Document(s)

 

           Amylase [Enzymatic activity/volume] in Body fluid Laboratory test res

ult                                  

[facility] ([facility]   

 

           Laboratory test finding (navigational concept) Laboratory test result

                                  

[facility] ([facility]   

 

                                        MANY WBCS

MANY RBCS

FEW GRAM POSITIVE COCCI IN PAIRS AND CHAINS



 

 

                          Bacteria identified in Unspecified specimen by Anaerob

e culture Laboratory test 

result                                              [facility] ([facility]  

 

                                        Comments: RIGHT PLEURAL EFFUSION

By: PEDRO STUART

Time: 1047

Description if other: RIGHT PLEURAL EFFUSION

By: PEDRO STUART

Time: 1046

 









                    ID                  Date                Data Source

 

                    [id number]            2021 11:10:00 AM EST [facility] ([facility]









          Name      Value     Range     Interpretation Code Description Data Mary

rce(s) Supporting 

Document(s)

 

                                        Lactate dehydrogenase [Enzymatic activit

y/volume] in Serum or Plasma by Lactate 

to pyruvate reaction 120 U/L                               [facility]  

 

                                        By: PARKER GALE

Time: 1037

By: PARKER GALE

Time: 1038

By: PARKER GALE Time: 1037 By: PARKER GALE Time: 1038

 









                    ID                  Date                Data Source

 

                    [id number]            2021 11:10:00 AM EST [facility] ([facility]









          Name      Value     Range     Interpretation Code Description Data Mary

rce(s) Supporting 

Document(s)

 

          Blood Urea Nitrogen 21 mg/dL  7-18                          [facility] ([facility]  

 

          Glucose, Fasting 96 mg/dL                          [facility] ([facility]  

 

           Creatinine For GFR 0.95 mg/dL 0.70-1.30                        [facility]

 ([facility]                                    

 

           Glomerular Filtration Rate Laboratory test result                    

              [facility] ([facility]                    

 

                                        <content>Units are mL/min/1.73 

m2</content><br/><content></content><br/><content>Chronic Kidney Disease Staging
per NKF:</content><br/><content></content><br/><content>Stage I & II   GFR >=60 
     Normal to Mildly Decreased</content><br/><content>Stage III      GFR 30-59 
    Moderately Decreased</content><br/><content>Stage IV       GFR 15-29      
Severely Decreased</content><br/><content>Stage V        GFR <15        Very 
Little GFR Left</content><br/><content>ESRD           GFR <15 on 
RRT</content><br/><content></content> 

 

          Chloride Level 110 meq/L                         [facility]  

 

          Sodium Level 142 meq/L 136-145                       [facility]  

 

          Potassium Serum 4.0 meq/L 3.5-5.1                       [facility]  

 

          Anion Gap 8 meq/L   8-16                          [facility]  

 

          Calcium Level 10.0 mg/dL 8.8-10.2                      [facility]  

 

          Carbon Dioxide Level 24 meq/L  21-32                         [facility] ([facility]  

 

          Ast/Sgot  7 U/L     7-37                          [facility]  

 

          Alt/SGPT  19 U/L    12-78                         [facility]  

 

          Bilirubin,Total 0.8 mg/dL 0.2-1.0                       [facility]  

 

          Alkaline Phosphatase 78 U/L                            [facility]  

 

          Albumin/Globulin Ratio 0.9                                     [facility] 

([facility]  

 

          Albumin   3.5 GM/DL 3.2-5.2                       [facility]  

 

          Total Protein 7.6 GM/DL 6.4-8.2                       [facility]  









                    ID                  Date                Data Source

 

                    [id number]            2021 11:10:00 AM EST [facility] ([facility]









          Name      Value     Range     Interpretation Code Description Data Mary

rce(s) Supporting 

Document(s)

 

          White Blood Count 10.2 10   4.0-10.0                      [facility] ([facility]  

 

                                        A Pathologist review of this differentia

l can help in the

evaluation of a differential diagnosis.  Please order a

Pathologist Review (PERISM) if deemed necessary.  Results

are subject to change if a Pathologist Review is performed.

 

 

          Red Blood Count 4.78 10   4.30-6.10                     [facility]  

 

          Hemoglobin 15.4 g/dL 13.5-17.5                     [facility] ([facility]  

 

           Mean Corpuscular Volume 97.1 fl    80.0-96.0                        M

[facility]                                    

 

          Hematocrit 46.4 %    42.0-52.0                     [facility]  

 

           Mean Corpuscular Hemoglobin 32.2 pg    27.0-33.0                     

   [facility]                              

 

           Red Cell Distribution Width 13.0 %     11.5-14.5                     

   [facility]                              

 

           Mean Corpuscular HGB Conc 33.2 g/dL  32.0-36.5                       

 [facility]                              

 

           Platelet Count, Automated 237 10     150-450                         

 [facility]                                    

 

          Lymph %   10.6 %    24.0-44.0                     [facility]  

 

          Neutrophils % 75.3 %    36.0-66.0                     [facility]  

 

          Mono %    12.9 %    0.0-5.0                       [facility]  

 

          Eos %     0.3 %     0.0-3.0                       [facility]  

 

          Immature Granulocyte % 0.3 %     0-3.0                         [facility] 

([facility]  

 

          Baso %    0.6 %     0.0-1.0                       [facility]  

 

          Neutrophils # 7.6 10    1.5-8.5                       [facility]  

 

          Nucleated Red Blood Cell % 0.0 %     0-0                           [facility]

[facility] ([facility] 



 

          Lymph #   1.1 10    1.5-5.0                       [facility]  

 

          Eos #     0.0 10    0.0-0.5                       [facility]  

 

          Mono #    1.3 10    0.0-0.8                       [facility]  

 

          Baso #    0.1 10    0.0-0.2                       [facility]  









                    ID                  Date                Data Source

 

                    [id number]         2021 11:10:00 AM EST [facility] ([facility])









          Name      Value     Range     Interpretation Code Description Data Mary

rce(s) Supporting 

Document(s)

 

                    Lactate dehydrogenase [Enzymatic activity/volume] in Serum o

r Plasma 120 U/L             

              Normal (applies to non-numeric results)                     

[facility] ([facility])                            

 

                                        By: PARKER GALE

Time: 1037

By: PARKER GALE

Time: 1038

By: PARKER GALE Time: 1037 By: PARKER GALE Time: 1038

 









                    ID                  Date                Data Source

 

                    [id number]         2021 11:10:00 AM EST [facility] ([facility])









          Name      Value     Range     Interpretation Code Description Data Mary

rce(s) Supporting 

Document(s)

 

           Glucose, Fasting 96 mg/dL        Normal (applies to non-numeric

 results)            

[facility] ([facility])  

 

           Blood Urea Nitrogen 21 mg/dL   7-18       Above high normal          

  [facility] ([facility], )                            

 

                Glomerular Filtration Rate Laboratory test result               

  Normal (applies to non-

numeric results)                        [facility]) 

 

 

                                        <content>Units are mL/min/1.73 

m2</content><br/><content></content><br/><content>Chronic Kidney Disease Staging
per NKF:</content><br/><content></content><br/><content>Stage I & II   GFR >=60 
     Normal to Mildly Decreased</content><br/><content>Stage III      GFR 30-59 
    Moderately Decreased</content><br/><content>Stage IV       GFR 15-29      
Severely Decreased</content><br/><content>Stage V        GFR <15        Very 
Little GFR Left</content><br/><content>ESRD           GFR <15 on 
RRT</content><br/><content></content> 

 

             Creatinine For GFR 0.95 mg/dL   0.70-1.30    Normal (applies to non

-numeric results) 

                          [facility] ([facility], )  

 

           Sodium Level 142 meq/L  136-145    Normal (applies to non-numeric res

ults)            [facility] 

([facility])         

 

           Chloride Level 110 meq/L       Above high normal            [facility]

[facility] ([facility], )                            

 

           Potassium Serum 4.0 meq/L  3.5-5.1    Normal (applies to non-numeric 

results)            

[facility] ([facility])  

 

           Calcium Level 10.0 mg/dL 8.8-10.2   Normal (applies to non-numeric re

sults)            

[facility] ([facility])  

 

           Anion Gap  8 meq/L    8-16       Normal (applies to non-numeric resul

ts)            [facility] 

([facility])         

 

           Carbon Dioxide Level 24 meq/L   21-32      Normal (applies to non-num

mynor results)            

[facility] ([facility])  

 

           Ast/Sgot   7 U/L      7-37       Normal (applies to non-numeric resul

ts)            [facility])                    

 

           Alkaline Phosphatase 78 U/L          Normal (applies to non-num

mynor results)            

[facility] ([facility])  

 

           Alt/SGPT   19 U/L     12-78      Normal (applies to non-numeric resul

ts)            [facility] 

([facility], )         

 

           Bilirubin,Total 0.8 mg/dL  0.2-1.0    Normal (applies to non-numeric 

results)            

[facility])  

 

           Total Protein 7.6 GM/DL  6.4-8.2    Normal (applies to non-numeric re

sults)            [facility])         

 

           Albumin/Globulin Ratio 0.9                   Normal (applies to non-n

umeric results)            [facility])         

 

           Albumin    3.5 GM/DL  3.2-5.2    Normal (applies to non-numeric resul

ts)            [facility])         









                    ID                  Date                Data Source

 

                    [id number]         2021 11:10:00 AM EST [facility] ([facility])









          Name      Value     Range     Interpretation Code Description Data Mary

rce(s) Supporting 

Document(s)

 

           Red Blood Count 4.78 10    4.30-6.10  Normal (applies to non-numeric 

results)            

[facility] ([facility], )  

 

           White Blood Count 10.2 10    4.0-10.0   Above high normal            

[facility] ([facility])                            

 

                                        A Pathologist review of this differentia

l can help in the

evaluation of a differential diagnosis.  Please order a

Pathologist Review (PERISM) if deemed necessary.  Results

are subject to change if a Pathologist Review is performed.

 

 

           Hemoglobin 15.4 g/dL  13.5-17.5  Normal (applies to non-numeric resul

ts)            [facility] 

([facility], )         

 

           Hematocrit 46.4 %     42.0-52.0  Normal (applies to non-numeric resul

ts)            [facility])         

 

           Mean Corpuscular Volume 97.1 fl    80.0-96.0  Above high normal      

      [facility])                    

 

                Mean Corpuscular HGB Conc 33.2 g/dL       32.0-36.5       Normal

 (applies to non-numeric 

results)                                [facility]) 

 

 

                Mean Corpuscular Hemoglobin 32.2 pg         27.0-33.0       Norm

al (applies to non-numeric 

results)                                [facility]) 

 

 

                Red Cell Distribution Width 13.0 %          11.5-14.5       Norm

al (applies to non-numeric 

results)                                [facility] ([facility]) 

 

 

                Platelet Count, Automated 237 10          150-450         Normal

 (applies to non-numeric results)

                                        [facility] ([facility]) 

 

 

           Neutrophils % 75.3 %     36.0-66.0  Above high normal            [facility]

[facility] ([facility])                            

 

           Lymph %    10.6 %     24.0-44.0  Below low normal            [facility] (

[facility])                                      

 

           Mono %     12.9 %     0.0-5.0    Above high normal            [facility] 

([facility])                                      

 

           Eos %      0.3 %      0.0-3.0    Normal (applies to non-numeric resul

ts)            [facility] ([facility])                    

 

           Baso %     0.6 %      0.0-1.0    Normal (applies to non-numeric resul

ts)            [facility] ([facility])                    

 

           Immature Granulocyte % 0.3 %      0-3.0      Normal (applies to non-n

umeric results)            

[facility] ([facility])  

 

           Nucleated Red Blood Cell % 0.0 %      0-0        Normal (applies to n

on-numeric results)            

[facility] ([facility])  

 

           Neutrophils # 7.6 10     1.5-8.5    Normal (applies to non-numeric re

sults)            [facility] 

([facility])         

 

           Lymph #    1.1 10     1.5-5.0    Below low normal            [facility] (

[facility])                                      

 

           Eos #      0.0 10     0.0-0.5    Normal (applies to non-numeric resul

ts)            [facility] ([facility])                    

 

           Mono #     1.3 10     0.0-0.8    Above high normal            [facility] 

([facility])                                      

 

           Baso #     0.1 10     0.0-0.2    Normal (applies to non-numeric resul

ts)            [facility] 

([facility])         









                    ID                  Date                Data Source

 

                    [id number]         2021 09:29:00 AM EST [facility] ([facility])









          Name      Value     Range     Interpretation Code Description Data Mary

rce(s) Supporting 

Document(s)

 

           Prothrombin Time 13.1 s     12.5-14.3  Normal (applies to non-numeric

 results)            

[facility] ([facility])  

 

                Partial Thromboplastin Time 34.6 s          24.2-38.5       Norm

al (applies to non-numeric 

results)                                [facility] ([facility]) 

 

 

           Inr        0.97                  Normal (applies to non-numeric resul

ts)            [facility] ([facility])                            

 

                                        THERAPUTIC HUMAN INR VALUES

INDICATIONS                      NORMAL RANGES

PROPHYLAXIS/TREATMENT OF:

VENOUS THROMBOSIS                2.0-3.0

PULMONARY EMBOLISM               2.0-3.0

PREVENTION OF SYSTEMIC EMBOLISM FROM:

TISSUE HEART VALVES              2.0-3.0

ACUTE MYOCARDIAL INFARCTION      2.0-3.0

VALVULAR HEART DISEASE           2.0-3.0

ATRIAL FIBRILLATION              2.0-3.0

MECHANICAL VALVES(HIGH RISK)     2.5-3.5

RECURRENT MYOCARDIAL INFARCTION  2.5-3.5

 









                    ID                  Date                Data Source

 

                    [id number]         2021 09:29:00 AM EST [facility] ([facility])









          Name      Value     Range     Interpretation Code Description Data Mary

rce(s) Supporting 

Document(s)

 

           aPTT in Platelet poor plasma by Coagulation assay Laboratory test res

ult                                  

[facility] ([facility])  

 

                Platelets [#/volume] in Blood by Automated count 312 10         

 150-450         Normal (applies 

to non-numeric results)                     [facility] ([facility])  









                    ID                  Date                Data Source

 

                    [id number]            2021 10:35:00 AM EST [facility] ([facility]









          Name      Value     Range     Interpretation Code Description Data Mary

rce(s) Supporting 

Document(s)

 

          Red Blood Count 4.60 10   4.30-6.10                     [facility] ([facility]  

 

          Hemoglobin 14.9 g/dL 13.5-17.5                     [facility] ([facility]  

 

          White Blood Count 10.2 10   4.0-10.0                      [facility] ([facility]  

 

           Mean Corpuscular Volume 94.1 fl    80.0-96.0                        [facility]

[facility] ([facility]                                    

 

          Hematocrit 43.3 %    42.0-52.0                     [facility]  

 

           Mean Corpuscular Hemoglobin 32.4 pg    27.0-33.0                     

   [facility] ([facility]                              

 

           Red Cell Distribution Width 12.7 %     11.5-14.5                     

   [facility] ([facility]                              

 

           Platelet Count, Automated 285 10     150-450                         

 [facility]                                    

 

           Mean Corpuscular HGB Conc 34.4 g/dL  32.0-36.5                       

 [facility] ([facility]                              

 

          Lymph %   8.1 %     24.0-44.0                     [facility]  

 

          Neutrophils % 80.3 %    36.0-66.0                     [facility] ([facility]  

 

          Mono %    9.7 %     0.0-5.0                       [facility]  

 

          Eos %     0.5 %     0.0-3.0                       [facility]  

 

          Baso %    0.7 %     0.0-1.0                       [facility]  

 

          Immature Granulocyte % 0.7 %     0-3.0                         [facility] 

([facility]  

 

          Neutrophils # 8.2 10    1.5-8.5                       [facility] ([facility]  

 

          Nucleated Red Blood Cell % 0.0 %     0-0                           [facility]

[facility] ([facility] 

 

 

          Lymph #   0.8 10    1.5-5.0                       [facility]  

 

          Mono #    1.0 10    0.0-0.8                       [facility]  

 

          Eos #     0.1 10    0.0-0.5                       [facility]  

 

          Baso #    0.1 10    0.0-0.2                       [facility]  









                    ID                  Date                Data Source

 

                    [id number]            2021 10:35:00 AM EST [facility] ([facility]









          Name      Value     Range     Interpretation Code Description Data Mary

rce(s) Supporting 

Document(s)

 

           Creatinine For GFR 0.85 mg/dL 0.70-1.30                        [facility]

 ([facility]                                    

 

          Blood Urea Nitrogen 17 mg/dL  7-18                          [facility] ([facility]  

 

          Glucose, Fasting 103 mg/dL                         [facility] ([facility]  

 

          Sodium Level 139 meq/L 136-145                       [facility] ([facility]  

 

           Glomerular Filtration Rate Laboratory test result                    

              [facility] ([facility]                    

 

                                        <content>Units are mL/min/1.73 

m2</content><br/><content></content><br/><content>Chronic Kidney Disease Staging
 per NKF:</content><br/><content></content><br/><content>Stage I & II   GFR >=60
       Normal to Mildly Decreased</content><br/><content>Stage III      GFR 30-
59      Moderately Decreased</content><br/><content>Stage IV       GFR 15-29    
  Severely Decreased</content><br/><content>Stage V        GFR <15        Very 
Little GFR Left</content><br/><content>ESRD           GFR <15 on 
RRT</content><br/><content></content> 

 

          Carbon Dioxide Level 23 meq/L  21-32                         [facility] ([facility]  

 

          Anion Gap 7 meq/L   8-16                          [facility]  

 

          Chloride Level 109 meq/L                         [facility] ([facility]  

 

          Potassium Serum 4.1 meq/L 3.5-5.1                       [facility] ([facility]  

 

          Calcium Level 9.7 mg/dL 8.8-10.2                      [facility] ([facility]  

 

          Ast/Sgot  11 U/L    7-37                          [facility]  

 

          Alkaline Phosphatase 78 U/L                            [facility] ([facility]  

 

          Bilirubin,Total 0.6 mg/dL 0.2-1.0                       [facility] ([facility]  

 

          Alt/SGPT  31 U/L    12-78                         [facility]  

 

          Total Protein 7.3 GM/DL 6.4-8.2                       [facility] ([facility]  

 

          Albumin/Globulin Ratio 0.7                                     [facility] 

([facility]  

 

          Albumin   3.1 GM/DL 3.2-5.2                       [facility]  









                    ID                  Date                Data Source

 

                    [id number]            2021 10:35:00 AM EST [facility] ([facility]









          Name      Value     Range     Interpretation Code Description Data Mary

rce(s) Supporting 

Document(s)

 

                                        Lactate dehydrogenase [Enzymatic activit

y/volume] in Serum or Plasma by Lactate 

to pyruvate reaction 107 U/L                               [facility] ([facility]  

 

                                        By: FRANCI MOTTA

Time: 0854

By: FRANCI MOTTA Time: 08

 









                    ID                  Date                Data Source

 

                    [id number]         2021 10:35:00 AM EST [facility] ([facility], )









          Name      Value     Range     Interpretation Code Description Data Mary

rce(s) Supporting 

Document(s)

 

                    Lactate dehydrogenase [Enzymatic activity/volume] in Serum o

r Plasma 107 U/L             

              Normal (applies to non-numeric results)                     

[facility])                            

 

                                        By: FRANCI MOTTA

Time: 854

By: FRANCI MOTTA Time: 854

 









                    ID                  Date                Data Source

 

                    [id number]         2021 10:35:00 AM EST [facility] ([facility])









          Name      Value     Range     Interpretation Code Description Data Mary

rce(s) Supporting 

Document(s)

 

           Glucose, Fasting 103 mg/dL       Above high normal            [facility]                            

 

           Blood Urea Nitrogen 17 mg/dL   7-18       Normal (applies to non-nume

lyndsey results)            

[facility] ([facility])  

 

           Sodium Level 139 meq/L  136-145    Normal (applies to non-numeric res

ults)            [facility])         

 

             Creatinine For GFR 0.85 mg/dL   0.70-1.30    Normal (applies to non

-numeric results) 

                          [facility] ([facility])  

 

                Glomerular Filtration Rate Laboratory test result               

  Normal (applies to non-

numeric results)                        [facility]) 

 

 

                                        <content>Units are mL/min/1.73 

m2</content><br/><content></content><br/><content>Chronic Kidney Disease Staging
per NKF:</content><br/><content></content><br/><content>Stage I & II   GFR >=60 
     Normal to Mildly Decreased</content><br/><content>Stage III      GFR 30-59 
    Moderately Decreased</content><br/><content>Stage IV       GFR 15-29      
Severely Decreased</content><br/><content>Stage V        GFR <15        Very 
Little GFR Left</content><br/><content>ESRD           GFR <15 on 
RRT</content><br/><content></content> 

 

           Potassium Serum 4.1 meq/L  3.5-5.1    Normal (applies to non-numeric 

results)            

[facility] ([facility])  

 

           Chloride Level 109 meq/L       Above high normal            [facility]

[facility] ([facility])                            

 

           Carbon Dioxide Level 23 meq/L   21-32      Normal (applies to non-num

mynor results)            

[facility] ([facility])  

 

           Calcium Level 9.7 mg/dL  8.8-10.2   Normal (applies to non-numeric re

sults)            

[facility] ([facility])  

 

           Anion Gap  7 meq/L    8-16       Below low normal            [facility] (

[facility])                                      

 

           Alt/SGPT   31 U/L     12-78      Normal (applies to non-numeric resul

ts)            [facility]         

 

           Ast/Sgot   11 U/L     7-37       Normal (applies to non-numeric resul

ts)            [facility] ([facility])                    

 

           Alkaline Phosphatase 78 U/L          Normal (applies to non-num

mynor results)            

[facility] ([facility])  

 

           Bilirubin,Total 0.6 mg/dL  0.2-1.0    Normal (applies to non-numeric 

results)            

[facility])  

 

           Total Protein 7.3 GM/DL  6.4-8.2    Normal (applies to non-numeric re

sults)            [facility]

([facility])         

 

           Albumin    3.1 GM/DL  3.2-5.2    Below low normal            [facility] (

[facility])                                      

 

           Albumin/Globulin Ratio 0.7                   Normal (applies to non-n

umeric results)            [facility])         









                    ID                  Date                Data Source

 

                    [id number]         2021 10:35:00 AM EST [facility] ([facility])









          Name      Value     Range     Interpretation Code Description Data Mary

rce(s) Supporting 

Document(s)

 

           White Blood Count 10.2 10    4.0-10.0   Above high normal            

[facility] ([facility])                            

 

           Hemoglobin 14.9 g/dL  13.5-17.5  Normal (applies to non-numeric resul

ts)            [facility] 

([facility])         

 

           Hematocrit 43.3 %     42.0-52.0  Normal (applies to non-numeric resul

ts)            [facility])         

 

           Red Blood Count 4.60 10    4.30-6.10  Normal (applies to non-numeric 

results)            

[facility])  

 

                Mean Corpuscular Volume 94.1 fl         80.0-96.0       Normal (

applies to non-numeric 

results)                                [facility]) 

 

 

                Mean Corpuscular Hemoglobin 32.4 pg         27.0-33.0       Norm

al (applies to non-numeric 

results)                                [facility] ([facility]) 

 

 

                Red Cell Distribution Width 12.7 %          11.5-14.5       Norm

al (applies to non-numeric 

results)                                [facility] ([facility]) 

 

 

                Mean Corpuscular HGB Conc 34.4 g/dL       32.0-36.5       Normal

 (applies to non-numeric 

results)                                [facility] ([facility]) 

 

 

                Platelet Count, Automated 285 10          150-450         Normal

 (applies to non-numeric results)

                                        [facility] ([facility]) 

 

 

           Lymph %    8.1 %      24.0-44.0  Below low normal            [facility] (

[facility])                                      

 

           Neutrophils % 80.3 %     36.0-66.0  Above high normal            [facility]

[facility] ([facility])                            

 

           Eos %      0.5 %      0.0-3.0    Normal (applies to non-numeric resul

ts)            [facility] ([facility])                    

 

           Mono %     9.7 %      0.0-5.0    Above high normal            [facility] 

([facility])

                                         

 

           Baso %     0.7 %      0.0-1.0    Normal (applies to non-numeric resul

ts)            [facility]                    

 

           Immature Granulocyte % 0.7 %      0-3.0      Normal (applies to non-n

umeric results)            

[facility] ([facility])  

 

           Lymph #    0.8 10     1.5-5.0    Below low normal            [facility] (

[facility])                                      

 

           Nucleated Red Blood Cell % 0.0 %      0-0        Normal (applies to n

on-numeric results)            

[facility]  

 

           Neutrophils # 8.2 10     1.5-8.5    Normal (applies to non-numeric re

sults)            [facility] 

([facility])         

 

           Baso #     0.1 10     0.0-0.2    Normal (applies to non-numeric resul

ts)            [facility] 

[facility])         

 

           Eos #      0.1 10     0.0-0.5    Normal (applies to non-numeric resul

ts)            [facility])                    

 

           Mono #     1.0 10     0.0-0.8    Above high normal            [facility] 

([facility])                                      









                    ID                  Date                Data Source

 

                    [id number]         2020 11:25:00 AM EST [facility] ([facility])









          Name      Value     Range     Interpretation Code Description Data Mary

rce(s) Supporting 

Document(s)

 

           Prothrombin Time 12.9 s     12.5-14.3  Normal (applies to non-numeric

 results)            

[facility] ([facility])  

 

           Inr        0.95                  Normal (applies to non-numeric resul

ts)            [facility] ([facility])                            

 

                                        THERAPUTIC HUMAN INR VALUES

INDICATIONS                      NORMAL RANGES

PROPHYLAXIS/TREATMENT OF:

VENOUS THROMBOSIS                2.0-3.0

PULMONARY EMBOLISM               2.0-3.0

PREVENTION OF SYSTEMIC EMBOLISM FROM:

TISSUE HEART VALVES              2.0-3.0

ACUTE MYOCARDIAL INFARCTION      2.0-3.0

VALVULAR HEART DISEASE           2.0-3.0

ATRIAL FIBRILLATION              2.0-3.0

MECHANICAL VALVES(HIGH RISK)     2.5-3.5

RECURRENT MYOCARDIAL INFARCTION  2.5-3.5

 

 

                Partial Thromboplastin Time 32.8 s          24.2-38.5       Norm

al (applies to non-numeric 

results)                                [facility] ([facility]) 

 









                    ID                  Date                Data Source

 

                    [id number]         2020 11:25:00 AM EST [facility] ([facility])









          Name      Value     Range     Interpretation Code Description Data Mary

rce(s) Supporting 

Document(s)

 

                Platelets [#/volume] in Blood by Automated count 342 10         

 150-450         Normal (applies 

to non-numeric results)                     [facility] ([facility])  

 

           aPTT in Platelet poor plasma by Coagulation assay Laboratory test res

ult                                  

[facility])  









                    ID                  Date                Data Source

 

                    [id number]            10/16/2020 12:19:00 PM [facility]









          Name      Value     Range     Interpretation Code Description Data Mary

rce(s) Supporting 

Document(s)

 

           Laboratory test finding (navigational concept) 154 mg/dL       

                      [facility] 

([facility]          

 

           Laboratory test finding (navigational concept) 140 meq/L  136-145    

                      [facility] 

([facility]          

 

           Laboratory test finding (navigational concept) 65.0 %     38.0-51.0  

                      [facility] 

([facility]          

 

           Laboratory test finding (navigational concept) 104 meq/L       

                      [facility] 

([facility]          

 

           Laboratory test finding (navigational concept) 3.4 meq/L  3.5-5.1    

                      [facility] 

([facility]          

 

           Laboratory test finding (navigational concept) 5.0 mg/dL  4.5-5.3    

                      [facility] 

([facility]          

 

           Laboratory test finding (navigational concept) 19.0 MM/L  23.0-27.0  

                      [facility] 

([facility]          

 

           Laboratory test finding (navigational concept) 0.6 mg/dL  0.6-1.3    

                      [facility] 

([facility]          

 

           Laboratory test finding (navigational concept) 24 mg/dL   8-26       

                      [facility] ([facility]                 









                    ID                  Date                Data Source

 

                    [id number]            10/16/2020 12:05:00 PM EDT [facility] ([facility]









          Name      Value     Range     Interpretation Code Description Data Mary

rce(s) Supporting 

Document(s)

 

           Troponin I.cardiac [Mass/volume] in Serum or Plasma 0.00 ng/mL 0.00-0

.08                        

[facility] ([facility]   









                    ID                  Date                Data Source

 

                    [id number]            10/16/2020 11:55:00 AM EDT [facility] ([facility]









          Name      Value     Range     Interpretation Code Description Data Mary

rce(s) Supporting 

Document(s)

 

           Laboratory test finding (navigational concept) 1.04       0.4-2.0    

                      [facility] ([facility]                 









                    ID                  Date                Data Source

 

                    [id number]            10/16/2020 11:52:00 AM EDT [facility] ([facility]









          Name      Value     Range     Interpretation Code Description Data Mary

rce(s) Supporting 

Document(s)

 

           Thyrotropin [Units/volume] in Serum or Plasma 0.480 uIU/ML 0.358-3.74

0                       

[facility] ([facility]   

 

                          Natriuretic peptide.B prohormone N-Terminal [Mass/volu

me] in Serum or Plasma 612

 pg/mL                                              [facility] ([facility]  









                    ID                  Date                Data Source

 

                    [id number]            10/16/2020 11:52:00 AM EDT [facility] ([facility]









          Name      Value     Range     Interpretation Code Description Data Mary

rce(s) Supporting 

Document(s)

 

          Ast/Sgot  24 U/L    7-37                          [facility]  

 

          Bilirubin,Total 1.5 mg/dL 0.2-1.0                       [facility] ([facility]  

 

          Alkaline Phosphatase 105 U/L                           [facility] ([facility]  

 

          Alt/SGPT  35 U/L    12-78                         [facility]  

 

          Total Protein 6.0 GM/DL 6.4-8.2                       [facility] ([facility]  

 

          Bilirubin,Direct 0.9 mg/dL 0.0-0.2                       [facility] ([facility]  

 

          Albumin   2.3 GM/DL 3.2-5.2                       [facility]  

 

          Albumin/Globulin Ratio 0.6                                     [facility] 

([facility]  









                    ID                  Date                Data Source

 

                    [id number]            10/16/2020 11:52:00 AM EDT [facility] ([facility]









          Name      Value     Range     Interpretation Code Description Data Mary

rce(s) Supporting 

Document(s)

 

          Inr       1.13                                    [facility]  

 

                                        THERAPUTIC HUMAN INR VALUES

INDICATIONS                      NORMAL RANGES

PROPHYLAXIS/TREATMENT OF:

VENOUS THROMBOSIS                2.0-3.0

PULMONARY EMBOLISM               2.0-3.0

PREVENTION OF SYSTEMIC EMBOLISM FROM:

TISSUE HEART VALVES              2.0-3.0

ACUTE MYOCARDIAL INFARCTION      2.0-3.0

VALVULAR HEART DISEASE           2.0-3.0

ATRIAL FIBRILLATION              2.0-3.0

MECHANICAL VALVES(HIGH RISK)     2.5-3.5

RECURRENT MYOCARDIAL INFARCTION  2.5-3.5

 

 

          Prothrombin Time 14.7 s    12.5-14.3                     [facility] ([facility]  









                    ID                  Date                Data Source

 

                    [id number]            10/16/2020 11:52:00 AM EDT [facility] ([facility]









          Name      Value     Range     Interpretation Code Description Data Mary

rce(s) Supporting 

Document(s)

 

          White Blood Count 13.3 10   4.0-10.0                      [facility] ([facility]  

 

          Red Blood Count 4.01 10   4.30-6.10                     [facility] ([facility]  

 

          Hematocrit 37.4 %    42.0-52.0                     [facility]  

 

           Mean Corpuscular Hemoglobin 32.7 pg    27.0-33.0                     

   [facility] ([facility]                              

 

           Mean Corpuscular Volume 93.3 fl    80.0-96.0                        M

[facility] ([facility]                                    

 

          Hemoglobin 13.1 g/dL 13.5-17.5                     [facility]  

 

           Mean Corpuscular HGB Conc 35.0 g/dL  32.0-36.5                       

 [facility]                              

 

           Red Cell Distribution Width 12.4 %     11.5-14.5                     

   [facility] ([facility]                              

 

          Lymph %   2.6 %     24.0-44.0                     [facility]  

 

           Platelet Count, Automated 344 10     150-450                         

 [facility]                                    

 

          Neutrophils % 86.7 %    36.0-66.0                     [facility] ([facility]  

 

          Mono %    9.4 %     0.0-5.0                       [facility]  

 

          Immature Granulocyte % 0.7 %     0-3.0                         [facility] 

([facility]  

 

          Eos %     0.2 %     0.0-3.0                       [facility]  

 

          Baso %    0.4 %     0.0-1.0                       [facility]  

 

          Nucleated Red Blood Cell % 0.0 %     0-0                           [facility] ([facility] 

 

 

          Neutrophils # 11.5 10   1.5-8.5                       [facility] ([facility]  

 

          Lymph #   0.3 10    1.5-5.0                       [facility]  

 

          Mono #    1.2 10    0.0-0.8                       [facility]  

 

          Baso #    0.1 10    0.0-0.2                       [facility]  

 

          Eos #     0.0 10    0.0-0.5                       [facility]  









                    ID                  Date                Data Source

 

                    [id number]            10/16/2020 11:52:00 AM EDT [facility] ([facility]









          Name      Value     Range     Interpretation Code Description Data Mary

rce(s) Supporting 

Document(s)

 

           Venous Partial Pressure Co2 28.1 mmHg  38.0-50.0                     

   [facility] ([facility]                              

 

          Venous PH 7.476 units 7.330-7.430                     [facility] ([facility]  

 

          Venous Hco3 20.3 meq/L 23.0-27.0                     [facility] ([facility]  

 

           Venous Total Co2 21.1 meq/L 24.0-28.0                        [facility] (

[facility]

                                         

 

           Venous Partial Pressure O2 140.0 mmHg 30.0-50.0                      

  [facility] ([facility]                              

 

           Venous O2 Saturation 98.9 %     60.0-80.0                        [facility]

[facility] ([facility]

                                         

 

          Venous Standard Hco3 22.9 meq/L                               [facility] (

[facility]  

 

          Venous Base Excess -1.9                                    [facility] ([facility]  









                    ID                  Date                Data Source

 

                    [id number]            10/16/2020 11:37:00 AM EDT [facility] ([facility]









          Name      Value     Range     Interpretation Code Description Data Mary

rce(s) Supporting 

Document(s)

 

          Venous PH 7.476 units 7.330-7.430                     [facility] ([facility]  

 

           Venous Partial Pressure Co2 28.1 mmHg  38.0-50.0                     

   [facility] ([facility]                              

 

           Venous Partial Pressure O2 140.0 mmHg 30.0-50.0                      

  [facility] ([facility]                              

 

           Venous Total Co2 21.1 meq/L 24.0-28.0                        [facility] (

[facility]

                                         

 

          Venous Base Excess -1.9                                    [facility] ([facility]  

 

          Venous Standard Hco3 22.9 meq/L                               [facility] (

[facility]  

 

          Venous Hco3 20.3 meq/L 23.0-27.0                     [facility] ([facility]  

 

           Venous O2 Saturation 98.9 %     60.0-80.0                        [facility]

[facility] ([facility]

                                         









                    ID                  Date                Data Source

 

                    [id number]            2020 08:51:00 AM EDT [facility] ([facility]









          Name      Value     Range     Interpretation Code Description Data Mary

rce(s) Supporting 

Document(s)

 

          HDL Cholesterol 44 mg/dL                                [facility] ([facility]  

 

          Cholesterol Level 180 mg/dL                               [facility] ([facility]  

 

          Triglycerides Level 73 mg/dL                                [facility] ([facility]  

 

          Non-HDL-C 136 mg/dL                               [facility]  

 

          LDL Cholesterol 121 mg/dL                               [facility]  

 

          Cholesterol Risk Ratio 4.090                                   [facility] 

([facility]  









                    ID                  Date                Data Source

 

                    [id number]            2020 08:51:00 AM EDT [facility] ([facility]









          Name      Value     Range     Interpretation Code Description Data Mary

e(s) Supporting 

Document(s)

 

          Glucose, Fasting 101 mg/dL                         [facility] ([facility]  

 

           Creatinine For GFR 1.04 mg/dL 0.70-1.30                        [facility]

 ([facility]                                    

 

          Blood Urea Nitrogen 19 mg/dL  7-18                          [facility] ([facility]  

 

           Glomerular Filtration Rate Laboratory test result                    

              [facility] ([facility]                    

 

                                        <content>Units are mL/min/1.73 

m2</content><br/><content></content><br/><content>Chronic Kidney Disease Staging
 per NK[facility]:</content><br/><content></content><br/><content>Stage I & II   GFR >=60
       Normal to Mildly Decreased</content><br/><content>Stage III      GFR 30-
59      Moderately Decreased</content><br/><content>Stage IV       GFR 15-29    
  Severely Decreased</content><br/><content>Stage V        GFR <15        Very 
Little GFR Left</content><br/><content>ESRD           GFR <15 on 
RRT</content><br/><content></content> 

 

          Sodium Level 142 meq/L 136-145                       [facility] ([facility]  

 

          Carbon Dioxide Level 24 meq/L  21-32                         [facility] ([facility]  

 

          Chloride Level 112 meq/L                         [facility]  

 

          Potassium Serum 4.1 meq/L 3.5-5.1                       [facility] ([facility]  

 

          Ast/Sgot  12 U/L    7-37                          [facility]  

 

          Calcium Level 8.8 mg/dL 8.8-10.2                      [facility]  

 

          Anion Gap 6 meq/L   8-16                          [facility]  

 

          Alkaline Phosphatase 72 U/L                            [facility]  

 

          Alt/SGPT  20 U/L    12-78                         [facility]  

 

          Albumin   3.7 GM/DL 3.2-5.2                       [facility]  

 

          Bilirubin,Total 0.8 mg/dL 0.2-1.0                       [facility]  

 

          Total Protein 6.9 GM/DL 6.4-8.2                       [facility]  

 

          Albumin/Globulin Ratio 1.2                                     [facility] 

([facility]  









                    ID                  Date                Data Source

 

                    [id number]            2020 08:51:00 AM EDT [facility] ([facility]









          Name      Value     Range     Interpretation Code Description Data Mary

rce(s) Supporting 

Document(s)

 

          White Blood Count 7.0 10    4.0-10.0                      [facility]  

 

          Hemoglobin 16.0 g/dL 13.5-17.5                     [facility]  

 

          Red Blood Count 4.71 10   4.30-6.10                     [facility]  

 

          Hematocrit 45.3 %    42.0-52.0                     [facility]  

 

           Mean Corpuscular Hemoglobin 34.0 pg    27.0-33.0                     

   [facility]                              

 

           Mean Corpuscular Volume 96.2 fl    80.0-96.0                        M

[facility]                                    

 

           Red Cell Distribution Width 12.7 %     11.5-14.5                     

   [facility]                              

 

           Mean Corpuscular HGB Conc 35.3 g/dL  32.0-36.5                       

 [facility]                              

 

          Lymph %   18.1 %    24.0-44.0                     [facility]  

 

           Platelet Count, Automated 219 10     150-450                         

 [facility]                                    

 

          Neutrophils % 68.0 %    36.0-66.0                     [facility]  

 

          Baso %    0.9 %     0.0-1.0                       [facility]  

 

          Eos %     1.6 %     0.0-3.0                       [facility]  

 

          Mono %    11.1 %    0.0-5.0                       [facility]  

 

          Nucleated Red Blood Cell % 0.0 %     0-0                           [facility] 

 

 

          Immature Granulocyte % 0.3 %     0-3.0                         [facility] 

([facility]  

 

          Mono #    0.8 10    0.0-0.8                       [facility]  

 

          Neutrophils # 4.8 10    1.5-8.5                       [facility]  

 

          Lymph #   1.3 10    1.5-5.0                       [facility]  

 

          Eos #     0.1 10    0.0-0.5                       [facility]  

 

          Baso #    0.1 10    0.0-0.2                       [facility]  







                                        Procedure

 

                                          



                                                                                
                                                                                
                                                                                
                                                                                
                                                                                
                                                          



Social History

          



           Code       Duration   Value      Status     Description Data Source(s

)

 

             Smoking      2021 12:00:00 AM EST Patient is a former smoker 

completed    Patient 

is a former smoker                      [facility] ([facility])

 

                    Smoking             2020 12:00:00 AM EST - 10/01/2016 

12:00:00 AM EDT Patient is a 

former smoker       completed           Patient is a former smoker [facility]



                                                                                
                            



Vital Signs

          



                    ID                  Date                Data Source

 

                    UNK                                      









           Name       Value      Range      Interpretation Code Description Data

 Source(s)

 

           Body surface area Derived from formula 1.85 m2                       

   1.85 m2    [facility] ([facility])

 

           Body weight 68.494 kg                        68.494 kg  [facility] ([facility])

 

           Ideal body weight 166 [lb_av]                       166 [lb_av] [facility]

T ([facility])

 

           Body mass index (BMI) [Ratio] 21.7 kg/m2                       21.7 k

g/m2 [facility] ([facility])

 

           Body weight 151.00 [lb_av]                       151.00 [lb_av] [facility]

T ([facility])

 

           Body height 70 [in_i]                        70 [in_i]  [facility] ([facility])

 

                                        5'10" 

 

           Oxygen saturation in Arterial blood by Pulse oximetry 97 %           

                  97 %       [facility] 

([facility])

 

                                        Room Air 

 

           Heart rate 80 /min                          80 /min    [facility] ([facility])

 

           Diastolic blood pressure 80 mm[Hg]                        80 mm[Hg]  

[facility] ([facility])

 

           Systolic blood pressure 140 mm[Hg]                       140 mm[Hg] [facility] ([facility])

 

           Body surface area Derived from formula 1.86 m2                       

   1.86 m2    [facility] ([facility])

 

           Body weight 68.947 kg                        68.947 kg  [facility] ([facility])

 

           Ideal body weight 166 [lb_av]                       166 [facility]av] [facility]

[facility] ([facility]

 

           Body mass index (BMI) [Ratio] 21.8 kg/m2                       21.8 k

g/m2 [facility] ([facility])

 

           Body weight 152.00 [lb_av]                       152.00 [lb_av] [facility]

[facility] ([facility]

 

           Body height 70 [in_i]                        70 [in_i]  [facility] ([facility])

 

                                        5'10" 

 

           Body temperature 96.0 [degF]                       96.0 [degF] [facility]

 ([facility])

 

           Oxygen saturation in Arterial blood by Pulse oximetry 96 %           

                  96 %       [facility] 

([facility])

 

           Heart rate 111 /min                         111 /min   [facility] ([facility])

 

           Diastolic blood pressure 90 mm[Hg]                        90 mm[Hg]  

[facility] ([facility])

 

           Systolic blood pressure 140 mm[Hg]                       140 mm[Hg] [facility] ([facility])

 

           Body mass index (BMI) [Ratio] 22.0 kg/m2                       22.0 k

g/m2 [facility] ([facility]

 

           Ideal body weight 166 [lb_av]                       166 [lb_av] [facility]

[facility] ([facility]

 

           Oxygen saturation in Arterial blood by Pulse oximetry 98 %           

                  98 %       [facility] 

([facility]

 

           Body weight 153.38 [lb_av]                       153.38 [lb_av] [facility]

[facility]

 

           Body height 70 [in_i]                        70 [in_i]  [facility]

 

                                        5'10" 

 

           Respiratory rate 18 /min                          18 /min    [facility] (

[facility]

 

           Body temperature 97.3 [degF]                       97.3 [degF] [facility]

 ([facility]

 

           Heart rate 88 /min                          88 /min    [facility] ([facility]

 

           Diastolic blood pressure 85 mm[Hg]                        85 mm[Hg]  

[facility] ([facility]

 

                                        recheck 

 

           Systolic blood pressure 149 mm[Hg]                       149 mm[Hg] M

[facility] ([facility]

 

                                        recheck 

 

           Diastolic blood pressure 85 mm[Hg]                        85 mm[Hg]  

[facility] ([facility]

 

           Systolic blood pressure 153 mm[Hg]                       153 mm[Hg] M

[facility] ([facility]

 

           Body surface area Derived from formula 1.86 m2                       

   1.86 m2    [facility]

 

           Body weight 68.947 kg                        68.947 kg  [facility] ([facility])

 

           Ideal body weight 166 [lb_av]                       166 [lb_av] [facility]

T ([facility])

 

           Body mass index (BMI) [Ratio] 21.8 kg/m2                       21.8 k

g/m2 [facility] ([facility])

 

           Body weight 152.00 [lb_av]                       152.00 [lb_av] [facility]

T ([facility])

 

           Body height 70 [in_i]                        70 [in_i]  [facility] ([facility], )

 

                                        5'10" 

 

           Oxygen saturation in Arterial blood by Pulse oximetry 94 %           

                  94 %       [facility] 

([facility], )

 

                                        Room Air 

 

           Heart rate 94 /min                          94 /min    [facility] ([facility])

 

           Diastolic blood pressure 80 mm[Hg]                        80 mm[Hg]  

[facility]

 

           Systolic blood pressure 136 mm[Hg]                       136 mm[Hg] 

[facility]

 

           Body mass index (BMI) [Ratio] 21.5 kg/m2                       21.5 k

g/m2 [facility] ([facility]

 

           Ideal body weight 166 [lb_av]                       166 [lb_av] [facility]

T ([facility]

 

           Oxygen saturation in Arterial blood by Pulse oximetry 97 %           

                  97 %       [facility] 

([facility]

 

           Body weight 150.12 [lb_av]                       150.12 [lb_av] [facility]

T ([facility]

 

           Body height 70 [in_i]                        70 [in_i]  [facility] ([facility]

 

                                        5'10" 

 

           Respiratory rate 20 /min                          20 /min    [facility] (

[facility]

 

           Body temperature 96.6 [degF]                       96.6 [degF] [facility]

 ([facility]

 

           Heart rate 111 /min                         111 /min   [facility] ([facility]

 

           Diastolic blood pressure 81 mm[Hg]                        81 mm[Hg]  

[facility] ([facility]

 

           Systolic blood pressure 124 mm[Hg]                       124 mm[Hg] [facility] ([facility]

 

           Diastolic blood pressure 82 mm[Hg]                        82 mm[Hg]  

[facility] ([facility]

 

           Systolic blood pressure 150 mm[Hg]                       150 mm[Hg] [facility] ([facility]

 

           Oxygen saturation in Arterial blood by Pulse oximetry 97 %           

                  97 %       [facility] 

([facility]

 

           Body weight 159.19 [lb_av]                       159.19 [lb_av] [facility]

T ([facility]

 

           Respiratory rate 16 /min                          16 /min    [facility] (

[facility]

 

           Body temperature 95.5 [degF]                       95.5 [degF] [facility]

 ([facility]

 

           Heart rate 81 /min                          81 /min    [facility]

 

           Diastolic blood pressure 89 mm[Hg]                        89 mm[Hg]  

[facility] ([facility]

 

           Systolic blood pressure 149 mm[Hg]                       149 mm[Hg] M

[facility] ([facility]

 

           Diastolic blood pressure 82 mm[Hg]                        82 mm[Hg]  

[facility] ([facility]

 

           Systolic blood pressure 143 mm[Hg]                       143 mm[Hg] M

[facility] ([facility]

 

           Body temperature 97.6 [degF]                       97.6 [degF] [facility]

 ([facility]

## 2021-02-10 NOTE — CCD
Continuity of Care Document (CCD)

                             Created on: 2021



Adams Barron

External Reference #: [id number]

: 1955

Sex: Male



Demographics





                          Address                   [address] [place]  [address]

 

                          Home Phone                [phone]

 

                          Preferred Language        Unknown

 

                          Marital Status            Unknown

 

                          Voodoo Affiliation     Unknown

 

                          Race                      White

 

                          Ethnic Group              Not  or 





Author





                          Author                    Adams GONZALEZ M.D.

 

                          Organization              Unknown

 

                          Address                   [address]

[place]  [address]



 

                          Phone                     [phone]







Care Team Providers





                    Care Team Member Name Role                Phone

 

                    [facility] - Dermatology [facility]            

    [phone]

 

                    [facility] - Urology [facility]                [phone]

 

                    Alan Humphries MD [facility]                [phone]

 

                    [facility] - Pulmonary Disease [facility]               

 [phone]







Problems





                    Active Problems     Provider            Date

 

                    Benign essential hypertension Natalia Gonzalez M.D. Onset

: 2012







Social History





                Type            Date            Description     Comments

 

                Birth Sex                       Unknown          

 

                Tobacco Use     Start: Unknown End: Unknown Current Cigarette Sm

oker Packs Daily 1  

 

                Tobacco Use     Start: Unknown  Never Smoked Cigars  

 

                Tobacco Use     Start: Unknown  Never Smoked A Pipe  

 

                Tobacco Use     Start: Unknown  Never Used Smokeless Tobacco  

 

                ETOH Use                        Denies alcohol use  

 

                Recreational Drug Use                 Denies Drug Use  

 

                Tobacco Use     Start: Unknown End:  Patient is a former

 smoker  

 

                Smoking Status  Reviewed: 20 Patient is a former smoker  

 

                Exercise Type/Frequency                 Exercises sporadically  

 

                Seat Belt/Car Seat                 Always uses seat belt  







Allergies, Adverse Reactions, Alerts





             Active Allergies Reaction     Severity     Comments     Date

 

             Bactrim                                             2015

 

                                        Inactive Allergies

 

             NKDA                                                2004







Medications





           Active Medications SIG        Qnty       Indications Ordering Provide

r Date

 

                          Spiriva Respimat                     2.5mcg/Act Aeroso

l                   inhale

2 spray(s) by mouth once daily 4units                          Natalia Gonzalez M.D. 2018

 

                          Proair HFA                     108(90Base) mcg/Act Aer

osol                   2 

puffs every 4 hours as needed as needed for cough or sob 8.500gm                

                 Natalia Gonzalez M.D.                          

 

                          Vitamin D-3                     1000Unit Capsules     

              1 by mouth 

every day                                       Unknown         

 

                                        History Medications

 

                          Ceftin                     500mg Tablets              

     1 by mouth twice a 

day x 5 days                                    Unknown         10/19/2020 - 10/



 

                          Doxycycline Hyclate                     100mg Tablets 

                  1 tab by

mouth twice a day for 5 days                                 Unknown         10/

 - 10/24/2020







Immunizations





             CPT Code     Status       Date         Vaccine      Lot #

 

                [id number]           Given           10/18/2020      Influenza Virus 

Vaccine, Quadrivalent,age 3 and 

up,multidose vial                        

 

             86371        Given        2020   Pneumococcal Vaccine [id number]

 

                86267           Given           10/09/2019      Influenza Virus 

Vaccine, Quadrivalent,age 3 and 

up,multidose vial                        

 

             12738        Given        2019   Prevnar 13 For Adults [id number]

 

                45229           Given           2018      Influenza Virus,

 quadravalent, preservative free,age 3 

and up                                  [id number]

 

                02811           Given           2018      Influenza Virus 

Vaccine, Quadrivalent,age 3 and 

up,multidose vial                        

 

             36806        Given        2015   Influenza Vaccination  

 

             79318        Given        2015   Influenza Vaccination [id number]

A

 

             37232        Given        2015   Adacel 11 Yrs or older 

AA







Vital Signs





                Date            Vital           Result          Comment

 

                2020 11:53am BP Systolic     153 mmHg         

 

                    BP Diastolic        85 mmHg              

 

                    BP Systolic Recheck 149 mmHg            recheck

 

                    BP Diastolic Recheck 85 mmHg             recheck

 

                    Heart Rate          88 /min              

 

                    Body Temperature    97.3 F             

 

                    Respiratory Rate    18 /min              

 

                    Height              70 inches           5'10"

 

                    Weight              153.38 lb            

 

                    O2 % BldC Oximetry  98 %                 

 

                    Peak Expiratory Flow Rate 505                 Estimated Peak

 Flow Rate

 

                    Ideal Body Weight   166 lb               

 

                    BMI (Body Mass Index) 22.0 kg/m2           

 

                10/27/2020  9:01am BP Systolic     150 mmHg         

 

                    BP Diastolic        82 mmHg              

 

                    BP Systolic Recheck 124 mmHg             

 

                    BP Diastolic Recheck 81 mmHg              

 

                    Heart Rate          111 /min             

 

                    Body Temperature    96.6 F             

 

                    Respiratory Rate    20 /min              

 

                    Height              70 inches           5'10"

 

                    Weight              150.12 lb            

 

                    O2 % BldC Oximetry  97 %                 

 

                    Peak Expiratory Flow Rate 505                 Estimated Peak

 Flow Rate

 

                    Ideal Body Weight   166 lb               

 

                    BMI (Body Mass Index) 21.5 kg/m2           







Results





        Test    Acquired Date Facility Test    Result  H/L     Range   Note

 

                    Istat Chem8+ Panel  10/16/2020          [facility]

[facility]

[facility] [place], [place] [id number] [phone] iSTAT HCT  65.0 %     High       38.0-51.0   

 

             iSTAT Glucose 154 mg/dL    High                 

 

             iSTAT Sodium 140 mEq/L    Normal       136-145       

 

             iSTAT Potassium 3.4 mEq/L    Low          3.5-5.1       

 

             iSTAT CA++   5.0 mg/dL    Normal       4.5-5.3       

 

             iSTAT Chloride 104 mEq/L    Normal               

 

             iSTAT Co2    19.0 MM/L    Low          23.0-27.0     

 

             iSTAT BUN    24 mg/dL     Normal       8-26          

 

             iSTAT Creatinine 0.6 mg/dL    Normal       0.6-1.3       

 

                    Laboratory test finding 10/16/2020          [facility] 

Center

[place] [address]

           [phone] iSTAT Troponin 0.00 NG/ML Normal     0.00-0.08   

 

                    Laboratory test finding 10/16/2020          [facility] 

[place]

[place] [address] [phone] iSTAT Lactate 1.04       Normal     0.4-2.0     

 

                    Venous Blood Gas    10/16/2020          [facility]

[place]

[place] [address] [phone] Venous PH  7.476 units High       7.330-7.430  

 

             Venous Partial Pressure Co2 28.1 mmHg    Low          38.0-50.0    

 

 

             Venous Partial Pressure O2 140.0 mmHg   High         30.0-50.0     

 

             Venous Total Co2 21.1 mEq/L   Low          24.0-28.0     

 

             Venous Hco3  20.3 mEq/L   Low          23.0-27.0     

 

             Venous Base Excess -1.9         Normal       -2.0-2.0      

 

             Venous Standard Hco3 22.9 mEq/L   Normal                     

 

             Venous O2 Saturation 98.9 %       High         60.0-80.0     

 

                    CBC With Differential 10/16/2020          [facility] Ce

[place]ter

[place] [address] [phone] White Blood Count 13.3 10    High       4.0-10.0    

 

             Red Blood Count 4.01 10      Low          4.30-6.10     

 

             Hemoglobin   13.1 g/dL    Low          13.5-17.5     

 

             Hematocrit   37.4 %       Low          42.0-52.0     

 

             Mean Corpuscular Volume 93.3 fl      Normal       80.0-96.0     

 

             Mean Corpuscular Hemoglobin 32.7 pg      Normal       27.0-33.0    

 

 

             Mean Corpuscular HGB Conc 35.0 g/dL    Normal       32.0-36.5     

 

             Red Cell Distribution Width 12.4 %       Normal       11.5-14.5    

 

 

             Platelet Count, Automated 344 10       Normal       150-450       

 

             Neutrophils % 86.7 %       High         36.0-66.0     

 

             Lymph %      2.6 %        Low          24.0-44.0     

 

             Mono %       9.4 %        High         0.0-5.0       

 

             Eos %        0.2 %        Normal       0.0-3.0       

 

             Baso %       0.4 %        Normal       0.0-1.0       

 

             Immature Granulocyte % 0.7 %        Normal       0-3.0         

 

             Nucleated Red Blood Cell % 0.0 %        Normal       0-0           

 

             Neutrophils # 11.5 10      High         1.5-8.5       

 

             Lymph #      0.3 10       Low          1.5-5.0       

 

             Mono #       1.2 10       High         0.0-0.8       

 

             Eos #        0.0 10       Normal       0.0-0.5       

 

             Baso #       0.1 10       Normal       0.0-0.2       

 

                    Prothrombin Time/Inr 10/16/2020          [place] [address] [phone] Prothrombin Time 14.7 seconds High       12.5-14.3   

 

             Inr          1.13         Normal                    1

 

                    Liver Profile       10/16/2020          Patient Service [facility] [place] [address] [phone] Ast/Sgot   24 U/L     Normal     7-37        

 

             Alt/SGPT     35 U/L       Normal       12-78         

 

             Alkaline Phosphatase 105 U/L      Normal               

 

             Bilirubin,Total 1.5 mg/dL    High         0.2-1.0       

 

             Bilirubin,Direct 0.9 mg/dL    High         0.0-0.2       

 

             Total Protein 6.0 GM/DL    Low          6.4-8.2       

 

             Albumin      2.3 GM/DL    Low          3.2-5.2       

 

             Albumin/Globulin Ratio 0.6          Normal                     

 

                    Laboratory test finding 10/16/2020          Patient Service 

[place] [address] [phone] NT-Pro  pg/mL  High       <125        

 

             Thyroid Stimulating Hormone 0.480 uIU/ML Normal       0.358-3.740  

 

 

                    Venous Blood Gas    10/16/2020          [facility]



[facility] [place] [id number]

           [phone] Venous PH  7.476 units High       7.330-7.430  

 

             Venous Partial Pressure Co2 28.1 mmHg    Low          38.0-50.0    

 

 

             Venous Partial Pressure O2 140.0 mmHg   High         30.0-50.0     

 

             Venous Total Co2 21.1 mEq/L   Low          24.0-28.0     

 

             Venous Hco3  20.3 mEq/L   Low          23.0-27.0     

 

             Venous Base Excess -1.9         Normal       -2.0-2.0      

 

             Venous Standard Hco3 22.9 mEq/L   Normal                     

 

             Venous O2 Saturation 98.9 %       High         60.0-80.0     







                          1                         THERAPUTIC HUMAN INR VALUES

INDICATIONS                      NORMAL RANGES

PROPHYLAXIS/TREATMENT OF:

VENOUS THROMBOSIS                2.0-3.0

PULMONARY EMBOLISM               2.0-3.0

PREVENTION OF SYSTEMIC EMBOLISM FROM:

TISSUE HEART VALVES              2.0-3.0

ACUTE MYOCARDIAL INFARCTION      2.0-3.0

VALVULAR HEART DISEASE           2.0-3.0

ATRIAL FIBRILLATION              2.0-3.0

MECHANICAL VALVES(HIGH RISK)     2.5-3.5

RECURRENT MYOCARDIAL INFARCTION  2.5-3.5









Procedures





                                        Description

 

                                        No Information Available







Medical Devices





                                        Description

 

                                        No Information Available







Encounters





           Type       Date       Location   Provider   Dx         Diagnosis

 

           Office Visit 2020 11:45a Main Office Natalia Gonzalez M.D. R

03.0      

Elevated blood-pressure reading, w/o diagnosis of htn

 

                          J44.9                     Chronic obstructive pulmonar

y disease, unspecified

 

                          Z13.89                    Encounter for screening for 

other disorder

 

           Office Visit 10/27/2020  9:00a Main Office Natalia Gonzalez M.D. J

44.9      

Chronic obstructive pulmonary disease, unspecified

 

                          J18.9                     Pneumonia, unspecified organ

ism







Assessments





                Date            Code            Description     Provider

 

                2020      R03.0           Elevated blood-pressure reading,

 without diagnosis of hypert 

Natalia Gonzalez M.D.

 

                2020      J44.9           Chronic obstructive pulmonary di

sease, unspecified Natalia Gonzalez M.D.

 

                2020      Z13.89          Encounter for screening for othe

r disorder Natalia Gonzalez M.D.

 

                10/27/2020      J44.9           Chronic obstructive pulmonary di

sease, unspecified Natalia Gonzalez M.D.

 

                10/27/2020      J18.9           Pneumonia, unspecified organism 

Natalia Gonzalez M.D.







Plan of Treatment

Future Appointment(s):* 2021 11:00 am - Natalia Gonzalez M.D. at Main 
  Office

2020 - Natalia Gonzalez M.D.* R03.0 Elevated blood-pressure reading, 
  without diagnosis of hypert

* J44.9 Chronic obstructive pulmonary disease, unspecified* Comments:* I  spoke 
  with patients pulmonologist, Dr. Echeverria.  He is going to make sure an f/u appt
  is arranged.  Right now no changes.



* Follow up:* 3-4 months





* Z13.89 Encounter for screening for other disorder





Goals

2020 - Natalia Gonzalez M.D.* R03.0 Elevated blood-pressure reading, 
  without diagnosis of hypert* Your blood pressure is elevated.  Focus on a 
  reduced calorie, lower sodium diet as well as increased exercise and weight 
  loss to improve blood pressure readings.  







Functional Status





                Functional Condition Comment         Date            Status

 

                Bifocal glasses                                 Active

 

                Independent with all ADL's                                 Activ

e

 

                Independent with all IADL's                                 Acti

ve

 

                Hearing Aid in both ears                                 Active







Mental Status





                Mental Condition Comment         Date            Status

 

                None                                            Active







Referrals





                                        Description

 

                                        No Information Available

## 2021-02-10 NOTE — CCD
Continuity of Care Document (CCD)

                             Created on: 2021



Adams Barron

External Reference #: [id number]

: 1955

Sex: Male



Demographics





                          Address                   [address] [place]  [address]

 

                          Home Phone                [phone]

 

                          Preferred Language        Unknown

 

                          Marital Status            Unknown

 

                          Gnosticism Affiliation     Unknown

 

                          Race                      White

 

                          Ethnic Group              Not  or 





Author





                          Author                    Adams ECHEVERRIA MD

 

                          Organization              Unknown

 

                          Address                   [address] [address]  [address]



 

                          Phone                     [phone]







Care Team Providers





                    Care Team Member Name Role                Phone

 

                    Natalia Andre M.D. AUTM                [phone]

 

                    Radiology/Procedure AUTM                Unavailable







Problems





                    Active Problems     Provider            Date

 

                    Screening for malignant neoplasm of colon KELSEY Bowers Onset: 2017

 

                    Dyspnea             Terese Heredia, A.N.P. Onset: 2018







Social History





                Type            Date            Description     Comments

 

                Birth Sex                       Unknown          

 

                Cigarette Use                   Pack Years -  40  

 

                ETOH Use                        Denies alcohol use  

 

                Recreational Drug Use                 Denies Drug Use  

 

                Tobacco Use     Start: Unknown End: Unknown Patient is a former 

smoker QUIT 2016  hx: [id number] started at age 17 

 

                Smoking Status  Reviewed: 21 Patient is a former smoker QU

IT 2016  

hx: [id number] started at age 17 







Allergies, Adverse Reactions, Alerts





                                        Description

 

                                        No Known Drug Allergies







Medications





           Active Medications SIG        Qnty       Indications Ordering Provide

r Date

 

                          Proair HFA                     108(90Base) mcg/Act Aer

osol                   2 

puffs four times a day as needed 8.500units      R06.02          Terese Heredia, A.

N.P. 

2018

 

                          Spiriva Respimat                     2.5mcg/Act Aeroso

l                   2 

puffs every day 4units                          Bird Echeverria MD 2017

 

                                        Vitamin D (Cholecalciferol)             

        1000Unit Capsules               

             1tab po qd                             Unknown      

 

                                        History Medications

 

                          Cefuroxime Axetil                     500mg Tablets   

                1 by mouth

twice a day     20tabs                          Bird Echeverria MD 2021 -

 2021

 

                          Cefuroxime Axetil                     500mg Tablets   

                1 by mouth

twice a day     20tabs                          Bird Echeverria MD 2020 -

 2021

 

                          Levofloxacin                     500mg Tablets        

           1 by mouth 

every day       10tabs                          Bird Echeverria MD 10/12/2020 -

 2020







Immunizations





             CPT Code     Status       Date         Vaccine      Lot #

 

             [id number]        Given        10/09/2019   Afluria, Quadrivalent, 0.5ml

, NDC# 13508-613-80  







Vital Signs





                Date            Vital           Result          Comment

 

                2021 11:25am BP Systolic     140 mmHg         

 

                    BP Diastolic        80 mmHg              

 

                    Heart Rate          80 /min              

 

                    O2 % BldC Oximetry  97 %                Room Air

 

                    Height              70 inches           5'10"

 

                    Weight              151.00 lb            

 

                    BMI (Body Mass Index) 21.7 kg/m2           

 

                    Ideal Body Weight   166 lb               

 

                    Weight              68.494 kg            

 

                    BSA (Body Surface Area) 1.85 m2              

 

                2020 10:56am BP Systolic     140 mmHg         

 

                    BP Diastolic        90 mmHg              

 

                    Heart Rate          111 /min             

 

                    O2 % BldC Oximetry  96 %                 

 

                    Body Temperature    96.0 F             

 

                    Height              70 inches           5'10"

 

                    Weight              152.00 lb            

 

                    BMI (Body Mass Index) 21.8 kg/m2           

 

                    Ideal Body Weight   166 lb               

 

                    Weight              68.947 kg            

 

                    BSA (Body Surface Area) 1.86 m2              







Results





        Test    Acquired Date Facility Test    Result  H/L     Range   Note

 

                    Coag Panel For Procedures 2021          [facility]

                                        [address] [place] [address] [phone] Platelet Count, Automated <pending>                   

      

 

             Partial Thromboplastin Time <pending>                              

 

 

                    CBC With Differential 2021          [facility]

                                        [address], [place] [id number] [phone] White Blood Count 10.2 10    High       4.0-10.0    

 

             Red Blood Count 4.60 10      Normal       4.30-6.10     

 

             Hemoglobin   14.9 g/dL    Normal       13.5-17.5     

 

             Hematocrit   43.3 %       Normal       42.0-52.0     

 

             Mean Corpuscular Volume 94.1 fl      Normal       80.0-96.0     

 

             Mean Corpuscular Hemoglobin 32.4 pg      Normal       27.0-33.0    

 

 

             Mean Corpuscular HGB Conc 34.4 g/dL    Normal       32.0-36.5     

 

             Red Cell Distribution Width 12.7 %       Normal       11.5-14.5    

 

 

             Platelet Count, Automated 285 10       Normal       150-450       

 

             Neutrophils % 80.3 %       High         36.0-66.0     

 

             Lymph %      8.1 %        Low          24.0-44.0     

 

             Mono %       9.7 %        High         0.0-5.0       

 

             Eos %        0.5 %        Normal       0.0-3.0       

 

             Baso %       0.7 %        Normal       0.0-1.0       

 

             Immature Granulocyte % 0.7 %        Normal       0-3.0         

 

             Nucleated Red Blood Cell % 0.0 %        Normal       0-0           

 

             Neutrophils # 8.2 10       Normal       1.5-8.5       

 

             Lymph #      0.8 10       Low          1.5-5.0       

 

             Mono #       1.0 10       High         0.0-0.8       

 

             Eos #        0.1 10       Normal       0.0-0.5       

 

             Baso #       0.1 10       Normal       0.0-0.2       

 

                    Comprehensive Metabolic Profil 2021          [facility]

                                        [address] [place] [address] [phone] Glucose, Fasting 103 mg/dL  High             

 

             Blood Urea Nitrogen 17 mg/dL     Normal       7-18          

 

             Creatinine For GFR 0.85 mg/dL   Normal       0.70-1.30     

 

             Glomerular Filtration Rate > 60.0       Normal       >49          1

 

             Sodium Level 139 mEq/L    Normal       136-145       

 

             Potassium Serum 4.1 mEq/L    Normal       3.5-5.1       

 

             Chloride Level 109 mEq/L    High                 

 

             Carbon Dioxide Level 23 mEq/L     Normal       21-32         

 

             Anion Gap    7 mEq/L      Low          8-16          

 

             Calcium Level 9.7 mg/dL    Normal       8.8-10.2      

 

             Ast/Sgot     11 U/L       Normal       7-37          

 

             Alt/SGPT     31 U/L       Normal       12-78         

 

             Alkaline Phosphatase 78 U/L       Normal               

 

             Bilirubin,Total 0.6 mg/dL    Normal       0.2-1.0       

 

             Total Protein 7.3 GM/DL    Normal       6.4-8.2       

 

             Albumin      3.1 GM/DL    Low          3.2-5.2       

 

             Albumin/Globulin Ratio 0.7          Normal                     

 

                    Laboratory test finding 2021          [facility]

                                        [address] [place] [address] [phone] LDH Lactate Dehydrogenase 107 U/L    Normal     

     2

 

                    Coag Panel For Procedures 2020          [facility]

                                        [address] [place] [address] [phone] Platelet Count, Automated 342 10     Normal     150-45

0     

 

             Partial Thromboplastin Time <pending>                              

 

 

                    Prothrombin Time/Inr 2020          [facility]

[facility]

                                        [address] [place] [address]

           [phone] Prothrombin Time 12.9 seconds Normal     12.5-14.3   

 

             Inr          0.95         Normal                    3

 

             Partial Thromboplastin Time 32.8 seconds Normal       24.2-38.5    

 







                          1                         Units are mL/min/1.73 m2



Chronic Kidney Disease Staging per NKF:



Stage I & II   GFR >=60       Normal to Mildly Decreased

Stage III      GFR 30-59      Moderately Decreased

Stage IV       GFR 15-29      Severely Decreased

Stage V        GFR <15        Very Little GFR Left

ESRD           GFR <15 on RRT



 

                          2                         By: FRANCI MOTTA

Time: 854

By: FRANCI MOTTA Time: 854



 

                          3                         THERAPUTIC HUMAN INR VALUES

INDICATIONS                      NORMAL RANGES

PROPHYLAXIS/TREATMENT OF:

VENOUS THROMBOSIS                2.0-3.0

PULMONARY EMBOLISM               2.0-3.0

PREVENTION OF SYSTEMIC EMBOLISM FROM:

TISSUE HEART VALVES              2.0-3.0

ACUTE MYOCARDIAL INFARCTION      2.0-3.0

VALVULAR HEART DISEASE           2.0-3.0

ATRIAL FIBRILLATION              2.0-3.0

MECHANICAL VALVES(HIGH RISK)     2.5-3.5

RECURRENT MYOCARDIAL INFARCTION  2.5-3.5









Procedures





                Date            Code            Description     Status

 

                    2021          99372               Computed Tomography 

Radiological Supervision And Interpretation

                                        Completed

 

                2020      31021           Inhaler Teaching Completed

 

                2020      10822           Spirometry      Completed







Medical Devices





                                        Description

 

                                        No Information Available







Encounters





           Type       Date       Location   Provider   Dx         Diagnosis

 

             Office Visit 2021 11:30a [facility] Moses Echeverria MD 

R91.8                                   Other nonspecific abnormal finding of barb

tomasz field

 

                          J43.1                     Panlobular emphysema

 

                          Z87.891                   Personal history of nicotine

 dependence

 

             Office Visit 2020 11:30a [facility] Moses Echeverria MD 

R91.8                                   Other nonspecific abnormal finding of barb

ng field

 

                          J43.1                     Panlobular emphysema

 

                          Z87.891                   Personal history of nicotine

 dependence

 

                          J90                       Pleural effusion, not elsewh

ere classified

 

             Office Visit 2020  1:45p [facility] Moses Echeverria MD 

R91.8                                   Other nonspecific abnormal finding of barb

ng field

 

                          J43.1                     Panlobular emphysema

 

                          Z87.891                   Personal history of nicotine

 dependence







Assessments





                Date            Code            Description     Provider

 

                2021      R91.8           Other nonspecific abnormal findi

ng of lung field Bird Echeverria MD

 

                2021      J43.1           Panlobular emphysema Bird marquez MD

 

                2021      Z87.891         Personal history of nicotine dep

endence Bird Echeverria MD

 

                2020      R91.8           Other nonspecific abnormal findi

ng of lung field Bird Echeverria MD

 

                2020      J43.1           Panlobular emphysema Bird marquez MD

 

                2020      Z87.891         Personal history of nicotine dep

endence Bird Echeverria MD

 

                2020      J90             Pleural effusion, not elsewhere 

classified Bird Echeverria MD

 

                2020      R91.8           Other nonspecific abnormal findi

ng of lung field Bird Echeverria MD

 

                2020      J43.1           Panlobular emphysema Bird marquez MD

 

                2020      Z87.891         Personal history of nicotine dep

endence Bird Echeverria MD

 

                10/16/2020      J43.1           Panlobular emphysema Bird marquez MD

 

                10/16/2020      R91.8           Other nonspecific abnormal findi

ng of lung field Bird Echeverria MD

 

                10/16/2020      R09.02          Hypoxemia       Bird Echeverria MD







Plan of Treatment

Future Appointment(s):* 2021 11:30 am - Bird Echeverria MD at [facility]

* 2021  1:30 pm - Bird Echeverria MD at [facility]

2020 - Bird Echeverria MD* R91.8 Other nonspecific abnormal finding of 
  lung field

* J43.1 Panlobular emphysema

* Z87.891 Personal history of nicotine dependence

* * New Labs:* FVL/Scooby, Scheduled: 21



* Comments:* ~ In view of the above, he is to continue current regimen.  He is 
  up to date on medications.  He is up to date on immunizations.  ~ We should 
  repeat his CT scan in mid-December, as he will be, at least, 8 weeks out with 
  palliated treatment.  I will call the results of it.~ If he is doing well and 
  has no other new issues, I will see him in return in a minimum of 4 months 
  from now with spirometry, oximetry and flow volume loop for his emphysema.  We
  await his return.



* Follow up:* CT mid Dec....will call results 4 months with fvl









Functional Status





                                        Description

 

                                        No Information Available







Mental Status





                                        Description

 

                                        No Information Available







Referrals





                Refer to      Reason for Referral Status          Appt Date

 

                Radiology/Procedure 84676           Created         

 

                                          

 

                Radiology/Procedure 69668           Closed          

 

                                          

 

                Radiology/Procedure 31734           Closed          

 

                                          

 

                Radiology/Procedure no site selection required approved [id number]  C

losed          

 

                                          

 

                          Radiology/Procedure       89234 CHT CHEST NO CONTRAST-

[id number] VALID 20-3/27/21

                          Closed

## 2021-02-10 NOTE — CCD
Continuity of Care Document (CCD)

                             Created on: 2021



Adams Barron

External Reference #: [id number]

: 1955

Sex: Male



Demographics





                          Address                   [address] [place] [address]

 

                          Home Phone                [phone]

 

                          Preferred Language        Unknown

 

                          Marital Status            Unknown

 

                          Taoist Affiliation     Unknown

 

                          Race                      White

 

                          Ethnic Group              Not  or 





Author





                          Organization              Unknown

 

                          Address                   Unknown

 

                          Phone                     Unavailable







Care Team Providers





                    Care Team Member Name Role                Phone

 

                    [facility] - Dermatology [facility]            

    [phone]

 

                    [facility] - Urology [facility]                [phone]

 

                    Alan Humphries MD [facility]                [phone]

 

                    [facility] Pul[facility]               

 [phone]







Problems





                    Active Problems     Provider            Date

 

                    Benign essential hypertension Natalia Andre M.D. Onset

: 2012







Social History





                Type            Date            Description     Comments

 

                Birth Sex                       Unknown          

 

                Tobacco Use     Start: Unknown End: Unknown Current Cigarette Sm

oker Packs Daily 1  

 

                Tobacco Use     Start: Unknown  Never Smoked Cigars  

 

                Tobacco Use     Start: Unknown  Never Smoked A Pipe  

 

                Tobacco Use     Start: Unknown  Never Used Smokeless Tobacco  

 

                ETOH Use                        Denies alcohol use  

 

                Recreational Drug Use                 Denies Drug Use  

 

                Tobacco Use     Start: Unknown End:  Patient is a former

 smoker  

 

                Smoking Status  Reviewed: 20 Patient is a former smoker  

 

                Exercise Type/Frequency                 Exercises sporadically  

 

                Seat Belt/Car Seat                 Always uses seat belt  







Allergies, Adverse Reactions, Alerts





             Active Allergies Reaction     Severity     Comments     Date

 

             Bactrim                                             2015

 

                                        Inactive Allergies

 

             NKDA                                                2004







Medications





           Active Medications SIG        Qnty       Indications Ordering Provide

r Date

 

                          Spiriva Respimat                     2.5mcg/Act Aeroso

l                   inhale

2 spray(s) by mouth once daily 4units                          Natalia Andre M.D. 2018

 

                          Proair HFA                     108(90Base) mcg/Act Aer

osol                   2 

puffs every 4 hours as needed as needed for cough or sob 8.500gm                

                 Natalia Andre M.D.                          

 

                          Vitamin D-3                     1000Unit Capsules     

              1 by mouth 

every day                                       Unknown         

 

                                        History Medications

 

                          Ceftin                     500mg Tablets              

     1 by mouth twice a 

day x 5 days                                    Unknown         10/19/2020 - 10/



 

                          Doxycycline Hyclate                     100mg Tablets 

                  1 tab by

mouth twice a day for 5 days                                 Unknown         10/

 - 10/24/2020







Immunizations





             CPT Code     Status       Date         Vaccine      Lot #

 

                [id number]           Given           10/18/2020      Influenza Virus 

Vaccine, Quadrivalent,age 3 and 

up,multidose vial                        

 

             [id number]        Given        2020   Pneumococcal Vaccine [id number]

 

                [id number]           Given           10/09/2019      Influenza Virus 

Vaccine, Quadrivalent,age 3 and 

up,multidose vial                        

 

             48699        Given        2019   Prevnar 13 For Adults [id number]

 

                [id number]           Given           2018      Influenza Virus,

 quadravalent, preservative free,age 3 

and up                                  [id number]

 

                72057           Given           2018      Influenza Virus 

Vaccine, Quadrivalent,age 3 and 

up,multidose vial                        

 

             78809        Given        2015   Influenza Vaccination  

 

             35637        Given        2015   Influenza Vaccination [id number]

A

 

             21874        Given        2015   Adacel 11 Yrs or older 

AA







Vital Signs





                Date            Vital           Result          Comment

 

                2020 11:53am BP Systolic     153 mmHg         

 

                    BP Diastolic        85 mmHg              

 

                    BP Systolic Recheck 149 mmHg            recheck

 

                    BP Diastolic Recheck 85 mmHg             recheck

 

                    Heart Rate          88 /min              

 

                    Body Temperature    97.3 F             

 

                    Respiratory Rate    18 /min              

 

                    Height              70 inches           5'10"

 

                    Weight              153.38 lb            

 

                    O2 % BldC Oximetry  98 %                 

 

                    Peak Expiratory Flow Rate 505                 Estimated Peak

 Flow Rate

 

                    Ideal Body Weight   166 lb               

 

                    BMI (Body Mass Index) 22.0 kg/m2           

 

                10/27/2020  9:01am BP Systolic     150 mmHg         

 

                    BP Diastolic        82 mmHg              

 

                    BP Systolic Recheck 124 mmHg             

 

                    BP Diastolic Recheck 81 mmHg              

 

                    Heart Rate          111 /min             

 

                    Body Temperature    96.6 F             

 

                    Respiratory Rate    20 /min              

 

                    Height              70 inches           5'10"

 

                    Weight              150.12 lb            

 

                    O2 % BldC Oximetry  97 %                 

 

                    Peak Expiratory Flow Rate 505                 Estimated Peak

 Flow Rate

 

                    Ideal Body Weight   166 lb               

 

                    BMI (Body Mass Index) 21.5 kg/m2           







Results





        Test    Acquired Date Facility Test    Result  H/L     Range   Note

 

                    Cell Count Pleural Fluid 2021          Patient [facility]

 Center

[place] [address]

           [phone] Source, Body Fluid PLEURAL    Normal                 

 

             Pleural FL Color RED          Normal       Colorless     

 

             Appearance, Body Fluid CLOTTED      Normal       Clear         

 

             WBC Body Fluid TNP /uL      Normal       0-10         1

 

             RBC Body Fluid TNP 10       Normal       <2           2

 

                    TP Body Fluid       2021          [place] [address]

           [phone] Total Protein, Body Fluid 5.2 g/dL   Normal     Not Es

tablished  

 

             Source, Body Fluid Tot Protein PLEURAL      Normal                 

    

 

                    LDH Body Fluid      2021          Patient Service [place] [id number] [phone] LDH, Body Fluid 24616 U/L  Normal     Not Established 

 

 

             Source, Body Fluid LDH PLEURAL      Normal                     

 

                    Glucose Body Fluid  2021          [place] [address]

           [phone] Glucose, Body Fluid 18 mg/dL   Normal     Not Establis

hed  

 

             Source, Body Fluid Glucose PLEURAL      Normal                     

 

                    Amylase Body Fluid  2021          [place] [address]

           [phone] Amylase, Body Fluid 42 U/L     Normal     Not Establis

hed  

 

             Source, Body Fluid Amylase PLEURAL      Normal                     

 

                    PH, Body Fluid      2021          Patient Service [place] [id number] [phone] PH Body Fluid TNP units  Normal     Not Established 3

 

             Source, Body Fluid pH PLEURAL      Normal                     

 

                    Laboratory test finding 2021          Patient Service 

[place] [address] [phone] Amylase Body Fluid <pending>                         

 

             Body Fluid Culture And GS (SEE NOTE)   Normal                    4

 

             Anaerobic Culture (SEE NOTE)                             5

 

                    CBC With Differential 2021          Patient Service Ce

[place]ter

[place] [address] [phone] White Blood Count 10.2 10    High       4.0-10.0   6

 

             Red Blood Count 4.78 10      Normal       4.30-6.10     

 

             Hemoglobin   15.4 g/dL    Normal       13.5-17.5     

 

             Hematocrit   46.4 %       Normal       42.0-52.0     

 

             Mean Corpuscular Volume 97.1 fl      High         80.0-96.0     

 

             Mean Corpuscular Hemoglobin 32.2 pg      Normal       27.0-33.0    

 

 

             Mean Corpuscular HGB Conc 33.2 g/dL    Normal       32.0-36.5     

 

             Red Cell Distribution Width 13.0 %       Normal       11.5-14.5    

 

 

             Platelet Count, Automated 237 10       Normal       150-450       

 

             Neutrophils % 75.3 %       High         36.0-66.0     

 

             Lymph %      10.6 %       Low          24.0-44.0     

 

             Mono %       12.9 %       High         0.0-5.0       

 

             Eos %        0.3 %        Normal       0.0-3.0       

 

             Baso %       0.6 %        Normal       0.0-1.0       

 

             Immature Granulocyte % 0.3 %        Normal       0-3.0         

 

             Nucleated Red Blood Cell % 0.0 %        Normal       0-0           

 

             Neutrophils # 7.6 10       Normal       1.5-8.5       

 

             Lymph #      1.1 10       Low          1.5-5.0       

 

             Mono #       1.3 10       High         0.0-0.8       

 

             Eos #        0.0 10       Normal       0.0-0.5       

 

             Baso #       0.1 10       Normal       0.0-0.2       

 

                    Comprehensive Metabolic Profil 2021          Patient [place] [id number] [phone] Glucose, Fasting 96 mg/dL   Normal           

 

             Blood Urea Nitrogen 21 mg/dL     High         7-18          

 

             Creatinine For GFR 0.95 mg/dL   Normal       0.70-1.30     

 

             Glomerular Filtration Rate > 60.0       Normal       >49          7

 

             Sodium Level 142 mEq/L    Normal       136-145       

 

             Potassium Serum 4.0 mEq/L    Normal       3.5-5.1       

 

             Chloride Level 110 mEq/L    High                 

 

             Carbon Dioxide Level 24 mEq/L     Normal       21-32         

 

             Anion Gap    8 mEq/L      Normal       8-16          

 

             Calcium Level 10.0 mg/dL   Normal       8.8-10.2      

 

             Ast/Sgot     7 U/L        Normal       7-37          

 

             Alt/SGPT     19 U/L       Normal       12-78         

 

             Alkaline Phosphatase 78 U/L       Normal               

 

             Bilirubin,Total 0.8 mg/dL    Normal       0.2-1.0       

 

             Total Protein 7.6 GM/DL    Normal       6.4-8.2       

 

             Albumin      3.5 GM/DL    Normal       3.2-5.2       

 

             Albumin/Globulin Ratio 0.9          Normal                     

 

                    Laboratory test finding 2021          Patient Service 

[place] [address] [phone] LDH Lactate Dehydrogenase 120 U/L    Normal     

     8

 

                    Laboratory test finding 2021          Patient Service 

[place] [address] [phone] LDH Lactate Dehydrogenase 107 U/L    Normal     

     9

 

                    Comprehensive Metabolic Profil 2021          Patient S

[place] [address] [phone] Glucose, Fasting 103 mg/dL  High             

 

             Blood Urea Nitrogen 17 mg/dL     Normal       7-18          

 

             Creatinine For GFR 0.85 mg/dL   Normal       0.70-1.30     

 

             Glomerular Filtration Rate > 60.0       Normal       >49          1

0

 

             Sodium Level 139 mEq/L    Normal       136-145       

 

             Potassium Serum 4.1 mEq/L    Normal       3.5-5.1       

 

             Chloride Level 109 mEq/L    High                 

 

             Carbon Dioxide Level 23 mEq/L     Normal       21-32         

 

             Anion Gap    7 mEq/L      Low          8-16          

 

             Calcium Level 9.7 mg/dL    Normal       8.8-10.2      

 

             Ast/Sgot     11 U/L       Normal       7-37          

 

             Alt/SGPT     31 U/L       Normal       12-78         

 

             Alkaline Phosphatase 78 U/L       Normal               

 

             Bilirubin,Total 0.6 mg/dL    Normal       0.2-1.0       

 

             Total Protein 7.3 GM/DL    Normal       6.4-8.2       

 

             Albumin      3.1 GM/DL    Low          3.2-5.2       

 

             Albumin/Globulin Ratio 0.7          Normal                     

 

                    CBC With Differential 2021          Patient Service Ce

[place] [address] [phone] White Blood Count 10.2 10    High       4.0-10.0    

 

             Red Blood Count 4.60 10      Normal       4.30-6.10     

 

             Hemoglobin   14.9 g/dL    Normal       13.5-17.5     

 

             Hematocrit   43.3 %       Normal       42.0-52.0     

 

             Mean Corpuscular Volume 94.1 fl      Normal       80.0-96.0     

 

             Mean Corpuscular Hemoglobin 32.4 pg      Normal       27.0-33.0    

 

 

             Mean Corpuscular HGB Conc 34.4 g/dL    Normal       32.0-36.5     

 

             Red Cell Distribution Width 12.7 %       Normal       11.5-14.5    

 

 

             Platelet Count, Automated 285 10       Normal       150-450       

 

             Neutrophils % 80.3 %       High         36.0-66.0     

 

             Lymph %      8.1 %        Low          24.0-44.0     

 

             Mono %       9.7 %        High         0.0-5.0       

 

             Eos %        0.5 %        Normal       0.0-3.0       

 

             Baso %       0.7 %        Normal       0.0-1.0       

 

             Immature Granulocyte % 0.7 %        Normal       0-3.0         

 

             Nucleated Red Blood Cell % 0.0 %        Normal       0-0           

 

             Neutrophils # 8.2 10       Normal       1.5-8.5       

 

             Lymph #      0.8 10       Low          1.5-5.0       

 

             Mono #       1.0 10       High         0.0-0.8       

 

             Eos #        0.1 10       Normal       0.0-0.5       

 

             Baso #       0.1 10       Normal       0.0-0.2       

 

                    Istat Chem8+ Panel  10/16/2020          [place] [address]

           [phone] iSTAT HCT  65.0 %     High       38.0-51.0   

 

             iSTAT Glucose 154 mg/dL    High                 

 

             iSTAT Sodium 140 mEq/L    Normal       136-145       

 

             iSTAT Potassium 3.4 mEq/L    Low          3.5-5.1       

 

             iSTAT CA++   5.0 mg/dL    Normal       4.5-5.3       

 

             iSTAT Chloride 104 mEq/L    Normal               

 

             iSTAT Co2    19.0 MM/L    Low          23.0-27.0     

 

             iSTAT BUN    24 mg/dL     Normal       8-26          

 

             iSTAT Creatinine 0.6 mg/dL    Normal       0.6-1.3       

 

                    Laboratory test finding 10/16/2020          Patient Service 

[place] [address] [phone] iSTAT Troponin 0.00 NG/ML Normal     0.00-0.08   

 

                    Laboratory test finding 10/16/2020          Patient Service 

[place] [address] [phone] iSTAT Lactate 1.04       Normal     0.4-2.0     

 

                    Venous Blood Gas    10/16/2020          [place] [address] [phone] Venous PH  7.476 units High       7.330-7.430  

 

             Venous Partial Pressure Co2 28.1 mmHg    Low          38.0-50.0    

 

 

             Venous Partial Pressure O2 140.0 mmHg   High         30.0-50.0     

 

             Venous Total Co2 21.1 mEq/L   Low          24.0-28.0     

 

             Venous Hco3  20.3 mEq/L   Low          23.0-27.0     

 

             Venous Base Excess -1.9         Normal       -2.0-2.0      

 

             Venous Standard Hco3 22.9 mEq/L   Normal                     

 

             Venous O2 Saturation 98.9 %       High         60.0-80.0     

 

                    CBC With Differential 10/16/2020          Patient Service Ce

nter

[place] [address] [phone] White Blood Count 13.3 10    High       4.0-10.0    

 

             Red Blood Count 4.01 10      Low          4.30-6.10     

 

             Hemoglobin   13.1 g/dL    Low          13.5-17.5     

 

             Hematocrit   37.4 %       Low          42.0-52.0     

 

             Mean Corpuscular Volume 93.3 fl      Normal       80.0-96.0     

 

             Mean Corpuscular Hemoglobin 32.7 pg      Normal       27.0-33.0    

 

 

             Mean Corpuscular HGB Conc 35.0 g/dL    Normal       32.0-36.5     

 

             Red Cell Distribution Width 12.4 %       Normal       11.5-14.5    

 

 

             Platelet Count, Automated 344 10       Normal       150-450       

 

             Neutrophils % 86.7 %       High         36.0-66.0     

 

             Lymph %      2.6 %        Low          24.0-44.0     

 

             Mono %       9.4 %        High         0.0-5.0       

 

             Eos %        0.2 %        Normal       0.0-3.0       

 

             Baso %       0.4 %        Normal       0.0-1.0       

 

             Immature Granulocyte % 0.7 %        Normal       0-3.0         

 

             Nucleated Red Blood Cell % 0.0 %        Normal       0-0           

 

             Neutrophils # 11.5 10      High         1.5-8.5       

 

             Lymph #      0.3 10       Low          1.5-5.0       

 

             Mono #       1.2 10       High         0.0-0.8       

 

             Eos #        0.0 10       Normal       0.0-0.5       

 

             Baso #       0.1 10       Normal       0.0-0.2       

 

                    Prothrombin Time/Inr 10/16/2020          [facility]

ter

[facility] [address] [phone] Prothrombin Time 14.7 seconds High       12.5-14.3   

 

             Inr          1.13         Normal                    11

 

                    Liver Profile       10/16/2020          [facility]

er

[facility] [address] [phone] Ast/Sgot   24 U/L     Normal     7-37        

 

             Alt/SGPT     35 U/L       Normal       12-78         

 

             Alkaline Phosphatase 105 U/L      Normal               

 

             Bilirubin,Total 1.5 mg/dL    High         0.2-1.0       

 

             Bilirubin,Direct 0.9 mg/dL    High         0.0-0.2       

 

             Total Protein 6.0 GM/DL    Low          6.4-8.2       

 

             Albumin      2.3 GM/DL    Low          3.2-5.2       

 

             Albumin/Globulin Ratio 0.6          Normal                     

 

                    Laboratory test finding 10/16/2020          Patient Service 

[place], [place] [id number] [phone] NT-Pro  pg/mL  High       <125        

 

             Thyroid Stimulating Hormone 0.480 uIU/ML Normal       0.358-3.740  

 

 

                    Venous Blood Gas    10/16/2020          [place] [address] [phone] Venous PH  7.476 units High       7.330-7.430  

 

             Venous Partial Pressure Co2 28.1 mmHg    Low          38.0-50.0    

 

 

             Venous Partial Pressure O2 140.0 mmHg   High         30.0-50.0     

 

             Venous Total Co2 21.1 mEq/L   Low          24.0-28.0     

 

             Venous Hco3  20.3 mEq/L   Low          23.0-27.0     

 

             Venous Base Excess -1.9         Normal       -2.0-2.0      

 

             Venous Standard Hco3 22.9 mEq/L   Normal                     

 

             Venous O2 Saturation 98.9 %       High         60.0-80.0     







                          1                         CLOTTED, UNABLE TO PERFORM T

ESTING



 

                          2                         CLOTTED, UNABLE TO PERFORM T

ESTING



 

                          3                         CLOTTED, UNABLE TO PERFORM T

ESTING



 

                          4                         MANY WBCS

MANY RBCS

FEW GRAM POSITIVE COCCI IN PAIRS AND CHAINS





 

                          5                         Comments: RIGHT PLEURAL EFFU

GRADY

By: PEDRO STUART

Time: 1047

Description if other: RIGHT PLEURAL EFFUSION

By: PEDRO STUART

Time: 1046



 

                          6                         A Pathologist review of this

 differential can help in the

evaluation of a differential diagnosis.  Please order a

Pathologist Review (PERISM) if deemed necessary.  Results

are subject to change if a Pathologist Review is performed.



 

                          7                         Units are mL/min/1.73 m2



Chronic Kidney Disease Staging per NKF:



Stage I & II   GFR >=60       Normal to Mildly Decreased

Stage III      GFR 30-59      Moderately Decreased

Stage IV       GFR 15-29      Severely Decreased

Stage V        GFR <15        Very Little GFR Left

ESRD           GFR <15 on RRT



 

                          8                         By: PARKER GALE

Time: 1037

By: PARKER  S

Time: 1038

By: PARKER GALE Time: 1037 By: PARKER  S Time: 1038



 

                          9                         By: FRANCI MOTTA

Time: 854

By: FRANCI MOTTA Time: 854



 

                          10                        Units are mL/min/1.73 m2



Chronic Kidney Disease Staging per NKF:



Stage I & II   GFR >=60       Normal to Mildly Decreased

Stage III      GFR 30-59      Moderately Decreased

Stage IV       GFR 15-29      Severely Decreased

Stage V        GFR <15        Very Little GFR Left

ESRD           GFR <15 on RRT



 

                          11                        THERAPUTIC HUMAN INR VALUES

INDICATIONS                      NORMAL RANGES

PROPHYLAXIS/TREATMENT OF:

VENOUS THROMBOSIS                2.0-3.0

PULMONARY EMBOLISM               2.0-3.0

PREVENTION OF SYSTEMIC EMBOLISM FROM:

TISSUE HEART VALVES              2.0-3.0

ACUTE MYOCARDIAL INFARCTION      2.0-3.0

VALVULAR HEART DISEASE           2.0-3.0

ATRIAL FIBRILLATION              2.0-3.0

MECHANICAL VALVES(HIGH RISK)     2.5-3.5

RECURRENT MYOCARDIAL INFARCTION  2.5-3.5









Procedures





                                        Description

 

                                        No Information Available







Medical Devices





                                        Description

 

                                        No Information Available







Encounters





           Type       Date       Location   Provider   Dx         Diagnosis

 

           Office Visit 2020 11:45a Main Office Natalia Andre M.D. R

03.0      

Elevated blood-pressure reading, w/o diagnosis of htn

 

                          J44.9                     Chronic obstructive pulmonar

y disease, unspecified

 

                          Z13.89                    Encounter for screening for 

other disorder

 

           Office Visit 10/27/2020  9:00a Main Office Natalia Andre M.D. J

44.9      

Chronic obstructive pulmonary disease, unspecified

 

                          J18.9                     Pneumonia, unspecified organ

ism







Assessments





                Date            Code            Description     Provider

 

                2020      R03.0           Elevated blood-pressure reading,

 without diagnosis of hypert 

Natalia Andre M.D.

 

                2020      J44.9           Chronic obstructive pulmonary di

sease, unspecified Natalia Andre M.D.

 

                2020      Z13.89          Encounter for screening for othe

r disorder Natalia Andre M.D.

 

                10/27/2020      J44.9           Chronic obstructive pulmonary di

sease, unspecified Natalia Andre M.D.

 

                10/27/2020      J18.9           Pneumonia, unspecified organism 

Natalia Andre M.D.







Plan of Treatment

Future Appointment(s):* 2021 11:00 am - Natalia Andre M.D. at Main 
  Office

2020 - Natalia Andre M.D.* R03.0 Elevated blood-pressure reading, 
  without diagnosis of hypert

* J44.9 Chronic obstructive pulmonary disease, unspecified* Comments:* I  spoke 
  with patients pulmonologist, Dr. Echeverria.  He is going to make sure an f/u appt
  is arranged.  Right now no changes.



* Follow up:* 3-4 months





* Z13.89 Encounter for screening for other disorder





Goals

2020 - Natalia Andre M.D.* R03.0 Elevated blood-pressure reading, 
  without diagnosis of hypert* Your blood pressure is elevated.  Focus on a 
  reduced calorie, lower sodium diet as well as increased exercise and weight 
  loss to improve blood pressure readings.  







Functional Status





                Functional Condition Comment         Date            Status

 

                Bifocal glasses                                 Active

 

                Independent with all ADL's                                 Activ

e

 

                Independent with all IADL's                                 Acti

ve

 

                Hearing Aid in both ears                                 Active







Mental Status





                Mental Condition Comment         Date            Status

 

                None                                            Active







Referrals





                                        Description

 

                                        No Information Available

## 2021-02-10 NOTE — REP
INDICATION:

DYSPNEA/COUGH/ do not delay for respiratory panel



COMPARISON:

Chest x-ray dated 02/08/2021.  CT dated 01/21/2021



TECHNIQUE:

Axial noncontrast images from the thoracic inlet to the upper abdomen with coronal and

sagittal reformations.



This CT examination was performed using the following dose reduction techniques:

Automated exposure control, adjustment of mA and/or kv according to the patient's

size, and use of iterative reconstruction technique.



FINDINGS:

There is a multilobulated area of masslike consolidation extending from the right

infrahilar region into the right lower lobe with the largest areas measuring roughly

5.2 x 3.3 cm (new) and 6.7 x 3.4 cm (stable) along with diffuse right lower lobe

interstitial prominence and thickening as well as small associated right pleural

effusion.  Associated endobronchial obstruction to the right lower lobe is also

identified as well as mediastinal lymph nodes measuring up to approximately 13 mm

short axis diameter.



Diffuse underlying advanced COPD and emphysematous changes with bronchiectasis and

scattered scarring noted.  Tracheobronchial tree is patent.  Atherosclerotic changes

to the thoracic aorta and coronary arteries noted without aortic aneurysm or

cardiomegaly.



IMPRESSION:

Increasing multilobulated masslike consolidation involving the right lower lobe now

extending towards the hilum with small pleural effusion and increased interstitial

thickening.  Findings are most compatible with malignancy and require further

investigation.





<Electronically signed by Dinh Spann > 02/10/21 0092

## 2021-02-11 VITALS — DIASTOLIC BLOOD PRESSURE: 79 MMHG | SYSTOLIC BLOOD PRESSURE: 130 MMHG

## 2021-02-11 VITALS — SYSTOLIC BLOOD PRESSURE: 121 MMHG | DIASTOLIC BLOOD PRESSURE: 65 MMHG

## 2021-02-11 VITALS — SYSTOLIC BLOOD PRESSURE: 123 MMHG | DIASTOLIC BLOOD PRESSURE: 61 MMHG

## 2021-02-11 LAB
BASOPHILS # BLD AUTO: 0 10^3/UL (ref 0–0.2)
BASOPHILS NFR BLD AUTO: 0.5 % (ref 0–1)
BUN SERPL-MCNC: 26 MG/DL (ref 7–18)
C DIFF TOX GENS STL QL NAA+PROBE: NEGATIVE
CALCIUM SERPL-MCNC: 8.7 MG/DL (ref 8.8–10.2)
CHLORIDE SERPL-SCNC: 108 MEQ/L (ref 98–107)
CO2 SERPL-SCNC: 24 MEQ/L (ref 21–32)
CREAT SERPL-MCNC: 0.94 MG/DL (ref 0.7–1.3)
EOSINOPHIL # BLD AUTO: 0.3 10^3/UL (ref 0–0.5)
EOSINOPHIL NFR BLD AUTO: 3.2 % (ref 0–3)
GFR SERPL CREATININE-BSD FRML MDRD: > 60 ML/MIN/{1.73_M2} (ref 49–?)
GLUCOSE SERPL-MCNC: 93 MG/DL (ref 70–100)
HCT VFR BLD AUTO: 40.8 % (ref 42–52)
HGB BLD-MCNC: 13.6 G/DL (ref 13.5–17.5)
LYMPHOCYTES # BLD AUTO: 0.9 10^3/UL (ref 1.5–5)
LYMPHOCYTES NFR BLD AUTO: 11.4 % (ref 24–44)
MCH RBC QN AUTO: 31.1 PG (ref 27–33)
MCHC RBC AUTO-ENTMCNC: 33.3 G/DL (ref 32–36.5)
MCV RBC AUTO: 93.4 FL (ref 80–96)
MONOCYTES # BLD AUTO: 1.2 10^3/UL (ref 0–0.8)
MONOCYTES NFR BLD AUTO: 15.3 % (ref 0–5)
NEUTROPHILS # BLD AUTO: 5.4 10^3/UL (ref 1.5–8.5)
NEUTROPHILS NFR BLD AUTO: 68.8 % (ref 36–66)
PLATELET # BLD AUTO: 247 10^3/UL (ref 150–450)
POTASSIUM SERPL-SCNC: 3.9 MEQ/L (ref 3.5–5.1)
RBC # BLD AUTO: 4.37 10^6/UL (ref 4.3–6.1)
SODIUM SERPL-SCNC: 139 MEQ/L (ref 136–145)
WBC # BLD AUTO: 7.8 10^3/UL (ref 4–10)

## 2021-02-11 RX ADMIN — ENOXAPARIN SODIUM SCH MG: 40 INJECTION SUBCUTANEOUS at 08:53

## 2021-02-11 RX ADMIN — DEXTROSE MONOHYDRATE SCH MLS/HR: 5 INJECTION INTRAVENOUS at 11:52

## 2021-02-11 RX ADMIN — TIOTROPIUM BROMIDE SCH INHALATION: 18 CAPSULE ORAL; RESPIRATORY (INHALATION) at 07:21

## 2021-02-11 RX ADMIN — DEXTROSE MONOHYDRATE SCH MLS/HR: 5 INJECTION INTRAVENOUS at 05:36

## 2021-02-11 RX ADMIN — IPRATROPIUM BROMIDE AND ALBUTEROL SULFATE SCH ML: .5; 3 SOLUTION RESPIRATORY (INHALATION) at 11:25

## 2021-02-11 RX ADMIN — METHYLPREDNISOLONE SODIUM SUCCINATE SCH MG: 40 INJECTION, POWDER, FOR SOLUTION INTRAMUSCULAR; INTRAVENOUS at 11:52

## 2021-02-11 RX ADMIN — IPRATROPIUM BROMIDE AND ALBUTEROL SULFATE SCH ML: .5; 3 SOLUTION RESPIRATORY (INHALATION) at 15:09

## 2021-02-11 RX ADMIN — DEXTROSE MONOHYDRATE SCH MLS/HR: 5 INJECTION INTRAVENOUS at 18:25

## 2021-02-11 RX ADMIN — IPRATROPIUM BROMIDE AND ALBUTEROL SULFATE SCH ML: .5; 3 SOLUTION RESPIRATORY (INHALATION) at 19:54

## 2021-02-11 RX ADMIN — METHYLPREDNISOLONE SODIUM SUCCINATE SCH MG: 40 INJECTION, POWDER, FOR SOLUTION INTRAMUSCULAR; INTRAVENOUS at 18:25

## 2021-02-11 NOTE — IPNPDOC
Subjective


Date Seen


The patient was seen on 2/11/21.





Subjective


Chief Complaint/HPI


Reports that he had 2 episodes of diarrhea last night, brown liquid stools. 

Complains of grumbling noises in the stomach, no pain or cramps. No fever or 

chills. SOB is unchanged.





Objective


Physical Examination


General Exam:  Positive: Alert, Cooperative, No Acute Distress, Other (cachexia.

)


Eye Exam:  Positive: PERRLA, Conjunctiva & lids normal, EOMI; 


   Negative: Sclera icteric


ENT Exam:  Positive: Atraumatic, Mucous membr. moist/pink, Pharynx Normal, Other

ENT (bitemporal wasting)


Neck Exam:  Positive: Supple; 


   Negative: JVD, thyromegaly


Chest Exam:  Positive: Wheezing (present), Diminished (at desiree right base)


Heart Exam:  Positive: Rate Normal, Regular Rhythm, Normal S1, Normal S2; 


   Negative: Murmurs, Rubs


Abdomen Exam:  Positive: BS Hyperactive, Soft; 


   Negative: Tenderness, Hepatospenomegaly


Extremity Exam:  Negative: Clubbing, Cyanosis, Edema





Assessment /Plan


Assessment


65 year old male with COPD h/o necrotizing pneumonia , s/p right lobectomy in 

2018 presented to the ED at the instruction of his pulmonologist for empyema 

with possible underlying malignancy in the right lower lobe. 





Right lower lobe empyema/ lobulated pneumonia.


?aspirations and inspissated secretions.


Started on aggressive pulmonary toileting with nebs, EZ pap, chest PT, acapella.


culture from fluid  2/4/21 grew Strep constallatus spp phary ; He was started on

oral antibiotics but he could not tolerate it   


blood cultures sent 


started on Zosyn 


Appreciate pulmonary consultation





Right lower lobe mass vs loculated pneumonia with parapneumonic effusion 


lobulated growing mass of the right lower lobe with some effusion, likely 

malignancy


Has biopsy ans aspiration done on 2/4


Path pending 





COPD with emphysema


with exacerbation due to large right lower lobe pneumonia/ mass


spiriva, duonebs, methyl pred





Diarrhea


likely antibiotic related


if continues will send c diff. 





Protein calorie malnutrition 


albumin of 2.5, Loss of 12 lbs over the past 3 months bitemporal wasting.





Plan/VTE


VTE Prophylaxis Ordered?:  Yes





VS, I&O, 24H, Fishbone


Vital Signs/I&O





Vital Signs








  Date Time  Temp Pulse Resp B/P (MAP) Pulse Ox O2 Delivery O2 Flow Rate FiO2


 


2/11/21 06:00 97.4 86 20 123/61 (81) 96   


 


2/10/21 20:00      Room Air  














I&O- Last 24 Hours up to 6 AM 


 


 2/11/21





 06:00


 


Intake Total 410 ml


 


Output Total 300 ml


 


Balance 110 ml











Laboratory Data


24H LABS


Laboratory Tests 2


2/10/21 16:36: Lactic Acid Level 1.0


2/10/21 16:37: 


Immature Granulocyte % (Auto) 0.7, Neutrophils (%) (Auto) 72.1H, Lymphocytes (%)

(Auto) 11.0L, Monocytes (%) (Auto) 14.4H, Eosinophils (%) (Auto) 1.4, Basophils 

(%) (Auto) 0.4, Neutrophils # (Auto) 5.8, Lymphocytes # (Auto) 0.9L, Monocytes #

(Auto) 1.2H, Eosinophils # (Auto) 0.1, Basophils # (Auto) 0.0, Nucleated Red 

Blood Cells % (auto) 0.0, Prothrombin Time 13.7, Prothromb Time International 

Ratio 1.03, Anion Gap 9, Glomerular Filtration Rate > 60.0, Calcium Level 8.6L, 

Total Bilirubin 0.4, Direct Bilirubin 0.1, Aspartate Amino Transf (AST/SGOT) 

48H, Alanine Aminotransferase (ALT/SGPT) 43, Alkaline Phosphatase 79, Total 

Creatine Kinase 53, Creatine Kinase MB 1.2, Creatine Kinase MB Relative Index 

2.26, Troponin I < 0.02, NT-Pro-B-Type Natriuretic Peptide 223H, Total Protein 

6.2L, Albumin 2.5L, Albumin/Globulin Ratio 0.7, Thyroid Stimulating Hormone 

(TSH) 0.496, Thyroxine (T4) 8.5


2/11/21 05:48: 


Immature Granulocyte % (Auto) 0.8, Neutrophils (%) (Auto) 68.8H, Lymphocytes (%)

(Auto) 11.4L, Monocytes (%) (Auto) 15.3H, Eosinophils (%) (Auto) 3.2H, Basophils

(%) (Auto) 0.5, Neutrophils # (Auto) 5.4, Lymphocytes # (Auto) 0.9L, Monocytes #

(Auto) 1.2H, Eosinophils # (Auto) 0.3, Basophils # (Auto) 0.0, Nucleated Red 

Blood Cells % (auto) 0.0, Anion Gap 7L, Glomerular Filtration Rate > 60.0, 

Calcium Level 8.7L


CBC/BMP


Laboratory Tests


2/10/21 16:37








2/11/21 05:48








Microbiology





Microbiology


2/10/21 Blood Culture, Received


          Pending


2/10/21 Blood Culture, Received


          Pending


2/10/21 Respiratory Virus Panel (PCR) (LIT) - Final, Complete











SANTI GALLARDO MD                   Feb 11, 2021 11:48

## 2021-02-11 NOTE — IPN
PROGRESS NOTE



DATE:  02/11/2021



SUBJECTIVE:  I again attended Adams Barron today on the medical/surgical

floor.  He was seen in my office yesterday for worsening symptoms of pneumonia

and was admitted.  



Maximum temperature (T-max) overnight 97.4, blood pressure in the 120s, heart

rate in the 80s, respiratory rate 16-20 without accessory muscle use.



White blood cell count 7.8, hemoglobin 13.6, platelet count 247,000, 16% segs,

no bands.  



Sodium 139, potassium 2.9, chloride 108, CO2 24, BUN 26, creatinine 0.94.



Cultures negative to date.



Respiratory panel negative.



He was able to eat this morning.  He still had some diarrhea.



CT scan done yesterday shows marked progression compared to a film done 10 days

ago, which shows progressive lower lobe rounded infiltrates.  His pleural based

density mildly enlarged as well.  



PHYSICAL EXAMINATION:   

He is awake, alert and appropriate.  

VITAL SIGNS:  Unremarkable and have been reviewed.

SKIN:  Normal turgor.  No rashes.

HEENT:  Otherwise normocephalic, atraumatic.  Pupils react.  Neck is supple

without convincing thyromegaly or adenopathy.  No obvious jugular venous

distention (JVD).  

Mucous membranes of the mouth are moist.  Dentition in good repair.

CHEST:  Well-healed right thoracotomy scar.  Hyperresonance on percussion

throughout, but decreased breath sound intensity at the right base.  No obvious

rub today.  There are some basal crackles with some egophony in the right base

posteriorly.   CARDIAC:  Regular rate and rhythm.  No murmur or gallop. 

Peripheral pulses palpable with no edema.

ABDOMEN:  Soft, nontender.  Normoactive bowel sounds. No convincing palpable

masses.

EXTREMITIES:  No cyanosis or clubbing. 

NEUROLOGIC:  Awake, alert, and appropriate. 

PSYCHIATRIC:  Normal affect.



IMPRESSION:  

1.  Right lower lobe pneumonia with progression.

2.  Streptococcus from pleural fluid.

3.  Recent right lower lobe biopsy suggesting an inflammatory process.

4.  Emphysema with advanced obstructive lung disease.

5.  Previous tobacco history.

6.  Resection of right upper lobe granuloma.



RECOMMENDATIONS:  At this point, given the progression on CT scan, it would

favor an inflammatory process, but certainly, malignancy must always be

entertained.  We will add intravenous (IV) steroids and will increase his

pulmonary toilet.  I have a low threshold for repeating his CT scan and

considering fiberoptic bronchoscopy and I did discuss this with him.



At this point, we will proceed as outlined above.  Further recommendations will

be made in the progress record as new information becomes available.

## 2021-02-12 VITALS — DIASTOLIC BLOOD PRESSURE: 67 MMHG | SYSTOLIC BLOOD PRESSURE: 122 MMHG

## 2021-02-12 VITALS — DIASTOLIC BLOOD PRESSURE: 79 MMHG | SYSTOLIC BLOOD PRESSURE: 141 MMHG

## 2021-02-12 VITALS — DIASTOLIC BLOOD PRESSURE: 66 MMHG | SYSTOLIC BLOOD PRESSURE: 137 MMHG

## 2021-02-12 LAB
BASOPHILS # BLD AUTO: 0 10^3/UL (ref 0–0.2)
BASOPHILS NFR BLD AUTO: 0.3 % (ref 0–1)
BUN SERPL-MCNC: 30 MG/DL (ref 7–18)
CALCIUM SERPL-MCNC: 8.7 MG/DL (ref 8.8–10.2)
CHLORIDE SERPL-SCNC: 110 MEQ/L (ref 98–107)
CO2 SERPL-SCNC: 22 MEQ/L (ref 21–32)
CREAT SERPL-MCNC: 1.13 MG/DL (ref 0.7–1.3)
CRP SERPL-MCNC: 7.27 MG/DL (ref 0–0.3)
EOSINOPHIL # BLD AUTO: 0 10^3/UL (ref 0–0.5)
EOSINOPHIL NFR BLD AUTO: 0 % (ref 0–3)
GFR SERPL CREATININE-BSD FRML MDRD: > 60 ML/MIN/{1.73_M2} (ref 49–?)
GLUCOSE SERPL-MCNC: 181 MG/DL (ref 70–100)
HCT VFR BLD AUTO: 40.2 % (ref 42–52)
HGB BLD-MCNC: 13.5 G/DL (ref 13.5–17.5)
LYMPHOCYTES # BLD AUTO: 0.5 10^3/UL (ref 1.5–5)
LYMPHOCYTES NFR BLD AUTO: 6.8 % (ref 24–44)
MCH RBC QN AUTO: 31.5 PG (ref 27–33)
MCHC RBC AUTO-ENTMCNC: 33.6 G/DL (ref 32–36.5)
MCV RBC AUTO: 93.9 FL (ref 80–96)
MONOCYTES # BLD AUTO: 0.3 10^3/UL (ref 0–0.8)
MONOCYTES NFR BLD AUTO: 3.3 % (ref 0–5)
NEUTROPHILS # BLD AUTO: 7.1 10^3/UL (ref 1.5–8.5)
NEUTROPHILS NFR BLD AUTO: 89 % (ref 36–66)
PLATELET # BLD AUTO: 266 10^3/UL (ref 150–450)
POTASSIUM SERPL-SCNC: 3.5 MEQ/L (ref 3.5–5.1)
RBC # BLD AUTO: 4.28 10^6/UL (ref 4.3–6.1)
SODIUM SERPL-SCNC: 144 MEQ/L (ref 136–145)
WBC # BLD AUTO: 7.9 10^3/UL (ref 4–10)

## 2021-02-12 PROCEDURE — 02HV33Z INSERTION OF INFUSION DEVICE INTO SUPERIOR VENA CAVA, PERCUTANEOUS APPROACH: ICD-10-PCS

## 2021-02-12 RX ADMIN — IPRATROPIUM BROMIDE AND ALBUTEROL SULFATE SCH ML: .5; 3 SOLUTION RESPIRATORY (INHALATION) at 23:24

## 2021-02-12 RX ADMIN — IPRATROPIUM BROMIDE AND ALBUTEROL SULFATE SCH ML: .5; 3 SOLUTION RESPIRATORY (INHALATION) at 15:28

## 2021-02-12 RX ADMIN — IPRATROPIUM BROMIDE AND ALBUTEROL SULFATE SCH ML: .5; 3 SOLUTION RESPIRATORY (INHALATION) at 00:09

## 2021-02-12 RX ADMIN — TIOTROPIUM BROMIDE SCH INHALATION: 18 CAPSULE ORAL; RESPIRATORY (INHALATION) at 07:03

## 2021-02-12 RX ADMIN — IPRATROPIUM BROMIDE AND ALBUTEROL SULFATE SCH ML: .5; 3 SOLUTION RESPIRATORY (INHALATION) at 03:42

## 2021-02-12 RX ADMIN — ENOXAPARIN SODIUM SCH MG: 40 INJECTION SUBCUTANEOUS at 08:33

## 2021-02-12 RX ADMIN — METHYLPREDNISOLONE SODIUM SUCCINATE SCH MG: 40 INJECTION, POWDER, FOR SOLUTION INTRAMUSCULAR; INTRAVENOUS at 03:45

## 2021-02-12 RX ADMIN — IPRATROPIUM BROMIDE AND ALBUTEROL SULFATE SCH ML: .5; 3 SOLUTION RESPIRATORY (INHALATION) at 07:03

## 2021-02-12 RX ADMIN — CEFTRIAXONE SCH MLS/HR: 2 INJECTION, POWDER, FOR SOLUTION INTRAMUSCULAR; INTRAVENOUS at 12:54

## 2021-02-12 RX ADMIN — IPRATROPIUM BROMIDE AND ALBUTEROL SULFATE SCH ML: .5; 3 SOLUTION RESPIRATORY (INHALATION) at 11:45

## 2021-02-12 RX ADMIN — METHYLPREDNISOLONE SODIUM SUCCINATE SCH MG: 125 INJECTION, POWDER, FOR SOLUTION INTRAMUSCULAR; INTRAVENOUS at 12:54

## 2021-02-12 RX ADMIN — DEXTROSE MONOHYDRATE SCH MLS/HR: 5 INJECTION INTRAVENOUS at 00:26

## 2021-02-12 RX ADMIN — METHYLPREDNISOLONE SODIUM SUCCINATE SCH MG: 125 INJECTION, POWDER, FOR SOLUTION INTRAMUSCULAR; INTRAVENOUS at 20:21

## 2021-02-12 RX ADMIN — IPRATROPIUM BROMIDE AND ALBUTEROL SULFATE SCH ML: .5; 3 SOLUTION RESPIRATORY (INHALATION) at 20:05

## 2021-02-12 RX ADMIN — DEXTROSE MONOHYDRATE SCH MLS/HR: 5 INJECTION INTRAVENOUS at 05:15

## 2021-02-12 NOTE — IPNPDOC
Subjective


Date Seen


The patient was seen on 2/12/21.





Subjective


Chief Complaint/HPI


Patient reports he is eating better, no more vomiting, Diarrhea has slowed down 

though still has liquid stools, no abdominal pain or cramps. No fever.





Objective


Physical Examination


General Exam:  Positive: Alert, Cooperative, No Acute Distress, Other (cachexia.

)


Eye Exam:  Positive: PERRLA, Conjunctiva & lids normal, EOMI; 


   Negative: Sclera icteric


ENT Exam:  Positive: Atraumatic, Mucous membr. moist/pink, Pharynx Normal, Other

ENT (bitemporal wasting)


Neck Exam:  Positive: Supple; 


   Negative: JVD, thyromegaly


Chest Exam:  Positive: Wheezing (present), Diminished (at the right base)


Heart Exam:  Positive: Rate Normal, Regular Rhythm, Normal S1, Normal S2; 


   Negative: Murmurs, Rubs


Abdomen Exam:  Positive: BS Hyperactive, Soft; 


   Negative: Tenderness, Hepatospenomegaly


Extremity Exam:  Negative: Clubbing, Cyanosis, Edema





Assessment /Plan


Assessment


65 year old male with COPD h/o necrotizing pneumonia , s/p right lobectomy in 

2018 presented to the ED at the instruction of his pulmonologist for empyema 

with possible underlying malignancy in the right lower lobe. 





Right lower lobe Abscess


S/P CT guided biopsy: culture from fluid  2/4/21 grew Strep constallatus spp 

phary 


Pathology pending. 


As per pulmonary it grew in size in 1 month no most likely it is infectious 

rather than malignant. 


Started on aggressive pulmonary toileting with nebs, EZ pap, chest PT, acapella.


Ceftriaxone with need long course. PICC line


ID  has been consulted. 


Appreciate pulmonary consultation





COPD with emphysema


with exacerbation due to large right lower lobe pneumonia/ mass


spiriva, duonebs, methyl pred





Diarrhea


likely antibiotic related


c diff negative. 





Protein calorie malnutrition 


albumin of 2.5, Loss of 12 lbs over the past 3 months bitemporal wasting.





Plan/VTE


VTE Prophylaxis Ordered?:  Yes





VS, I&O, 24H, Fishbone


Vital Signs/I&O





Vital Signs








  Date Time  Temp Pulse Resp B/P (MAP) Pulse Ox O2 Delivery O2 Flow Rate FiO2


 


2/12/21 06:00 98.1 91 20 122/67 (85) 93   


 


2/10/21 20:00      Room Air  














I&O- Last 24 Hours up to 6 AM 


 


 2/12/21





 06:00


 


Intake Total 1720 ml


 


Output Total 600 ml


 


Balance 1120 ml











Laboratory Data


24H LABS


Laboratory Tests 2


2/11/21 20:58: 


Clostridium difficile [id number] PRESUMPTIVE NEGATIVE, Clostridium difficile 

Toxin (PCR) NEGATIVE


2/12/21 05:30: 


Immature Granulocyte % (Auto) 0.6, Neutrophils (%) (Auto) 89.0H, Lymphocytes (%)

(Auto) 6.8L, Monocytes (%) (Auto) 3.3, Eosinophils (%) (Auto) 0.0, Basophils (%)

(Auto) 0.3, Neutrophils # (Auto) 7.1, Lymphocytes # (Auto) 0.5L, Monocytes # 

(Auto) 0.3, Eosinophils # (Auto) 0.0, Basophils # (Auto) 0.0, Nucleated Red Bl

ood Cells % (auto) 0.0, Anion Gap 12, Glomerular Filtration Rate > 60.0, Calcium

Level 8.7L, C-Reactive Protein, Quantitative 7.27H


CBC/BMP


Laboratory Tests


2/12/21 05:30








Microbiology





Microbiology


2/10/21 Blood Culture - Preliminary, Resulted


          No growth after 24 hours . All specim...


2/10/21 Blood Culture - Preliminary, Resulted


          No growth after 24 hours . All specim...


2/10/21 Respiratory Virus Panel (PCR) (LIT) - Final, Complete











SANTI GALLARDO MD                   Feb 12, 2021 11:34

## 2021-02-12 NOTE — IPN
PULMONARY PROGRESS NOTE



DATE:  02/12/2021



SUBJECTIVE:  The patient states he feels significant improvement likely from

the IV steroids, he has been ambulating with less shortness of breath. At home

he is on Spiriva as far as inhaled therapy. He has not been on any LABA or

inhaled steroids. He says he prefers to be on a single agent and does not want

the risk of inhaled steroids. It does not appear that he chronically requires

prednisone. It does not appear that he has bronchospastic issues. His main

underlying disease is emphysema and therefore Spiriva seems adequate. On going

back through his history, he has had some remote tooth abscess issues. He has

had multiple fractured teeth. 



On review of his prior pleural fluid on 02/04/2021, showed Strep constellatus

which had intermittent sensitivities to penicillin and Clindamycin but was

sensitive to Levofloxacin, Vancomycin and Ceftriaxone. Since the time he has

been in the hospital he has been on Zosyn and on 80 mg of Solu-Medrol. He has

had no recurrent diarrhea. He is having a bowel movement once a day that is

still a little bit loose. He had some diarrhea and augmented at home. 



PHYSICAL EXAMINATION:

VITAL SIGNS: Temperature is 98, pulse is 91, respiratory rate is 20, blood

pressure is 122/67, oxygen saturation is 93% on room air. 

GENERAL: Awake, alert and oriented. Affect and mood are appropriate. Speech is

clear and non-tangential. He is able to speak full sentences without shortness

of breath.

HEENT: Sclera clear, nonicteric. Pupils equal and reactive to light. Mucous

membranes are moist without lesions. Oropharynx is without erythema or exudate.

He does have multiple tooth fractures, one missing tooth, a molar. Tongue is

midline without lesion. No evidence of thrush. 

NECK: Supple. No tracheal deviation or mass. Trachea is midline. 

LYMPH: No cervical, supraclavicular or axillary adenopathy.

CARDIAC: Distant S1 and S2 without audible murmur, rub or gallop. No elevated

JVP. No peripheral edema.

PULMONARY: Decreased breath sounds at the right base. There is some faint

associated rales. No rhonchi. There is increased tactile fremitus at the right

lower base. There is no dullness to percussion. 

ABDOMEN: Scaphoid, soft, nontender, nondistended. No discernible

hepatosplenomegaly or masses. 

EXTREMITIES: No cyanosis, clubbing or edema. 

SKIN: No rashes, jaundice or bruising.

MUSCULOSKELETAL: Some muscle wasting without obvious joint effusion or

fracture. 



LABORATORY DATA: White blood cell count of 7.9, hemoglobin 13.5 with a platelet

count of 266,000 with 89% neutrophilia on differential. Sodium is 144,

potassium is 3.5, chloride is 110, bicarbonate of 22, BUN of 30, creatinine of

1.13 with a glucose of 181. 



There is no new imaging. 



Pleural fluid as mentioned above.



IMPRESSION: 

  1.  Right lower lobe abscess from Strep constellatus with intermittent

      coverage to penicillin may be why he failed Augmentin therapy. At this

      point in time do not think bronchoscopy is necessary. This is likely

      infection. Will likely need prolonged antibiotic therapy in a patient who

      is going to be on steroids for quinolones may not be the ideal therapy and

      given the fact that he had failed outpatient Augmentin would recommend

      placement of PICC line and switching to Ceftriaxone. I have consulted ID

      to help facilitate antibiotics at home. He will require follow up with 
chest ct to ensure resolution.

  2.  Emphysema, on Spiriva, will taper steroids slowly as he likely has

      pleuritis from the infection. 

  3.  Hyperglycemia, mild in nature, continue to monitor while on steroids. 

  4.  Protein malnourishment, encourage protein intake. 

MTDD

## 2021-02-12 NOTE — REP
INDICATION:

lung abscess.



COMPARISON:

None.



TECHNIQUE:

The procedure was performed under the direct supervision of Dr. Loo.



The risks and benefits of the procedure were explained to the patient and informed

consent was obtained.



The right basilic vein was localized using ultrasound guidance.  The skin was prepped

and draped in a sterile fashion.  2% lidocaine was used as a local anesthetic.  Using

ultrasound guidance the basilic vein was cannulated and a 0.018 guidewire was inserted

and advanced to the SVC using fluoroscopic guidance.  The needle was removed and a 5.5

Barbadian dilator and peel-away sheath was inserted over the guide wire.  A 5.5 Barbadian

dual lumen catheter was cut to length of 43 cm.  The dilator was removed and the

catheter was inserted over the guide wire with the tip ending in the SVC.  The

peel-away sheath was removed and the catheter was flushed with heparinized saline as

per Hospital protocol.  The catheter was affixed to the skin and a sterile dressing

was applied.





The patient tolerated the procedure well and there were no immediate complications.



0.1 minute of fluoro time was utilized for this procedure.



FINDINGS:

None



IMPRESSION:

PICC line insertion right basilic vein.



<Electronically signed by Roberto Kent > 02/12/21 1701

<Electronically signed by Phill Loo > 02/12/21 [id number]

## 2021-02-13 VITALS — DIASTOLIC BLOOD PRESSURE: 73 MMHG | SYSTOLIC BLOOD PRESSURE: 120 MMHG

## 2021-02-13 VITALS — DIASTOLIC BLOOD PRESSURE: 67 MMHG | SYSTOLIC BLOOD PRESSURE: 141 MMHG

## 2021-02-13 VITALS — DIASTOLIC BLOOD PRESSURE: 76 MMHG | SYSTOLIC BLOOD PRESSURE: 137 MMHG

## 2021-02-13 LAB
BASOPHILS # BLD AUTO: 0 10^3/UL (ref 0–0.2)
BASOPHILS NFR BLD AUTO: 0.2 % (ref 0–1)
BUN SERPL-MCNC: 35 MG/DL (ref 7–18)
CALCIUM SERPL-MCNC: 9.1 MG/DL (ref 8.8–10.2)
CHLORIDE SERPL-SCNC: 109 MEQ/L (ref 98–107)
CO2 SERPL-SCNC: 26 MEQ/L (ref 21–32)
CREAT SERPL-MCNC: 0.91 MG/DL (ref 0.7–1.3)
EOSINOPHIL # BLD AUTO: 0 10^3/UL (ref 0–0.5)
EOSINOPHIL NFR BLD AUTO: 0 % (ref 0–3)
GFR SERPL CREATININE-BSD FRML MDRD: > 60 ML/MIN/{1.73_M2} (ref 49–?)
GLUCOSE SERPL-MCNC: 142 MG/DL (ref 70–100)
HCT VFR BLD AUTO: 38.3 % (ref 42–52)
HGB BLD-MCNC: 12.8 G/DL (ref 13.5–17.5)
LYMPHOCYTES # BLD AUTO: 0.7 10^3/UL (ref 1.5–5)
LYMPHOCYTES NFR BLD AUTO: 4.3 % (ref 24–44)
MCH RBC QN AUTO: 31.4 PG (ref 27–33)
MCHC RBC AUTO-ENTMCNC: 33.4 G/DL (ref 32–36.5)
MCV RBC AUTO: 93.9 FL (ref 80–96)
MONOCYTES # BLD AUTO: 0.8 10^3/UL (ref 0–0.8)
MONOCYTES NFR BLD AUTO: 4.6 % (ref 2–8)
NEUTROPHILS # BLD AUTO: 15.1 10^3/UL (ref 1.5–8.5)
NEUTROPHILS NFR BLD AUTO: 89.6 % (ref 36–66)
PLATELET # BLD AUTO: 297 10^3/UL (ref 150–450)
POTASSIUM SERPL-SCNC: 4 MEQ/L (ref 3.5–5.1)
RBC # BLD AUTO: 4.08 10^6/UL (ref 4.3–6.1)
SODIUM SERPL-SCNC: 143 MEQ/L (ref 136–145)
WBC # BLD AUTO: 16.9 10^3/UL (ref 4–10)

## 2021-02-13 RX ADMIN — SODIUM CHLORIDE SCH ML: 9 INJECTION, SOLUTION INTRAMUSCULAR; INTRAVENOUS; SUBCUTANEOUS at 04:27

## 2021-02-13 RX ADMIN — CEFTRIAXONE SCH MLS/HR: 2 INJECTION, POWDER, FOR SOLUTION INTRAMUSCULAR; INTRAVENOUS at 12:27

## 2021-02-13 RX ADMIN — IPRATROPIUM BROMIDE AND ALBUTEROL SULFATE SCH ML: .5; 3 SOLUTION RESPIRATORY (INHALATION) at 03:01

## 2021-02-13 RX ADMIN — Medication SCH UNITS: at 04:27

## 2021-02-13 RX ADMIN — IPRATROPIUM BROMIDE AND ALBUTEROL SULFATE SCH ML: .5; 3 SOLUTION RESPIRATORY (INHALATION) at 23:54

## 2021-02-13 RX ADMIN — IPRATROPIUM BROMIDE AND ALBUTEROL SULFATE SCH ML: .5; 3 SOLUTION RESPIRATORY (INHALATION) at 20:00

## 2021-02-13 RX ADMIN — IPRATROPIUM BROMIDE AND ALBUTEROL SULFATE SCH ML: .5; 3 SOLUTION RESPIRATORY (INHALATION) at 11:26

## 2021-02-13 RX ADMIN — IPRATROPIUM BROMIDE AND ALBUTEROL SULFATE SCH ML: .5; 3 SOLUTION RESPIRATORY (INHALATION) at 07:26

## 2021-02-13 RX ADMIN — Medication SCH UNITS: at 18:41

## 2021-02-13 RX ADMIN — SODIUM CHLORIDE PRN ML: 9 INJECTION, SOLUTION INTRAMUSCULAR; INTRAVENOUS; SUBCUTANEOUS at 04:28

## 2021-02-13 RX ADMIN — SODIUM CHLORIDE SCH ML: 9 INJECTION, SOLUTION INTRAMUSCULAR; INTRAVENOUS; SUBCUTANEOUS at 18:41

## 2021-02-13 RX ADMIN — METHYLPREDNISOLONE SODIUM SUCCINATE SCH MG: 125 INJECTION, POWDER, FOR SOLUTION INTRAMUSCULAR; INTRAVENOUS at 12:27

## 2021-02-13 RX ADMIN — ENOXAPARIN SODIUM SCH MG: 40 INJECTION SUBCUTANEOUS at 10:28

## 2021-02-13 RX ADMIN — Medication PRN UNITS: at 12:28

## 2021-02-13 RX ADMIN — CALCIUM CARBONATE (ANTACID) CHEW TAB 500 MG PRN MG: 500 CHEW TAB at 19:44

## 2021-02-13 RX ADMIN — SODIUM CHLORIDE PRN ML: 9 INJECTION, SOLUTION INTRAMUSCULAR; INTRAVENOUS; SUBCUTANEOUS at 12:28

## 2021-02-13 RX ADMIN — Medication PRN UNITS: at 20:25

## 2021-02-13 RX ADMIN — METHYLPREDNISOLONE SODIUM SUCCINATE SCH MG: 125 INJECTION, POWDER, FOR SOLUTION INTRAMUSCULAR; INTRAVENOUS at 20:23

## 2021-02-13 RX ADMIN — TIOTROPIUM BROMIDE SCH INHALATION: 18 CAPSULE ORAL; RESPIRATORY (INHALATION) at 07:26

## 2021-02-13 RX ADMIN — METHYLPREDNISOLONE SODIUM SUCCINATE SCH MG: 125 INJECTION, POWDER, FOR SOLUTION INTRAMUSCULAR; INTRAVENOUS at 04:12

## 2021-02-13 RX ADMIN — Medication PRN UNITS: at 04:28

## 2021-02-13 RX ADMIN — IPRATROPIUM BROMIDE AND ALBUTEROL SULFATE SCH ML: .5; 3 SOLUTION RESPIRATORY (INHALATION) at 16:02

## 2021-02-13 NOTE — CR
CONSULTATION

DATE: 02/12/2021



Asked to consult by Dr. Perez for evaluation of right lung empyema and need for

outpatient intravenous (IV) antibiotics. 



HISTORY OF PRESENT ILLNESS:  Mr. Barron is a pleasant 65-year-old gentleman

with a history of chronic obstructive pulmonary disease (COPD) and tobacco

abuse who was admitted on February 10 with a complaint of weakness and

worsening right lung effusion with empyema. The patient has a history of

pneumonia diagnosed October 16, hospitalized for 3 days, treated with

doxycycline and cefuroxime. The patient was followed up closely by Dr. Echeverria

and had multiple courses of outpatient antibiotic, mostly oral. Repeat chest CT

done in December 2020 showed progressive infiltrate opacity of the right lower

lobe with adjacent pleural effusion. January 18 there was a loculated fluid

that was 6.7 cm. Had decreased to 3.1 cm, but on February 4 the patient had a

CT-guided aspiration of his right lung that was positive for Streptococcus

constellatus that was treated with Augmentin. The patient was having increasing

diarrhea and vomiting on antibiotics, and he stated that he was more short of

breathing with minimal exertion. He stated that in the summer he was able to

garden, and now he cannot go upstairs in his house to go to the bathroom. He

had intermittent fevers, especially when he was off antibiotics. He denied any

pleuritic chest pain. He had a 15-pound weight loss in the past 6 months since

October. The patient was admitted for thoracentesis and intravenous (IV)

antibiotic. 



ADMISSION MEDICATIONS: 

- Augmentin started on February  for 21 days, one tablet twice a day 

- vitamin D3 at 1000 units daily 

- Spiriva 2.5 mcg two puffs inhaled daily 

- albuterol as needed 

- ibuprofen as needed 



Currently patient is on: 

- Zosyn 3.375 grams IV every 6 hours

- methylprednisolone 80 mg IV every 8 hours

- albuterol Atrovent nebulizers

- Spiriva one inhalation daily.



ALLERGIES: No known drug allergies.



LABORATORY DATA: 

White count 7.9, hemoglobin 13.5, hematocrit 40.2, platelets 266, 89%

neutrophils, 7% lymphocytes, 3% monocytes. Sodium 144, potassium 3.5, chloride

110, bicarbonate 22, BUN 30, creatinine 1.13, glucose 181, calcium 8.7, CRP

7.27. . Total protein 6.2, albumin 2.5. PT 13.7, INR 1.03. Clostridium

(C) difficile toxin was negative on February 8. 



Blood cultures, two sets, on February 10 were negative. Respiratory panel was

negative by multiplex [employer]. Pleural fluid culture on lung biopsy had

Streptococcus constellatus species pharyngitis, intermediate to clindamycin and

pencilling with LIT of 0.5. Fine needle aspiration cell block from February 4

is still pending for cytology. Acid-fast bacillus (AFB) fungal smear and

culture are all pending from pleural fluid as well.



PHYSICAL EXAMINATION: 

He is a pleasant, thin-looking gentleman in no acute distress. 

Temperature is 96.5, pulse 100, respirations 20, blood pressure 141/79, oxygen

saturation 96% on room air. 

Oropharynx is clear with no obvious abscesses or swelling. No lesions.

HEART: Normal S1, S2, distant. No murmurs appreciated. 

LUNGS: Diminished breath sounds at the right lung FPC. Left lung: Good air

entry. No wheezes, rales, or rhonchi.

ABDOMEN: Soft, nontender. NO hepatosplenomegaly.

BACK: No costovertebral angle (CVA) or lumbosacral tenderness.

EXTREMITIES: No clubbing, cyanosis, or edema. No calf tenderness.

SKIN: No rashes. No petechia.

MUSCULOSKELETAL: Knees/hips: Normal range of motion.



IMPRESSION: 

This is a 65-year-old gentleman with a history of tobacco abuse who has not

done well since October 2020 with pneumonia that has developed into a lung

abscess empyema. He also recalls having a dental abscess somewhere in the

spring of 2020 that healed spontaneously, and he has not made it to a dental

office. That could have a source of infection. 



Case has been discussed with Dr. Perez, who agrees on the peripherally inserted

central catheter (PICC) line for home IV antibiotic, which has been ordered for

today. Discontinue IV Zosyn. Switch to Rocephin 2 grams IV daily to cover for

Streptococcus constellatus, which is susceptible to ceftriaxone. Consult

patient and family services (PFS) for home IV antibiotic to teach patient and

wife on administration. Monitor complete blood count (CBC), C-reactive protein

(CRP), comprehensive metabolic profile (CMP), erythrocyte sedimentation rate

(ESR) weekly with antibiotic.

## 2021-02-13 NOTE — IPNPDOC
Subjective


Date Seen


The patient was seen on 2/13/21.





Subjective


Chief Complaint/HPI


Feeling better. He reports that he walked int eh hallway the full [place]. He is 

eating better, his diarrhea has stopped. He has a PICC line.





Objective


Physical Examination


General Exam:  Positive: Alert, Cooperative, No Acute Distress, Other (cachexia.

)


Eye Exam:  Positive: PERRLA, Conjunctiva & lids normal, EOMI; 


   Negative: Sclera icteric


ENT Exam:  Positive: Atraumatic, Mucous membr. moist/pink, Pharynx Normal, Other

ENT (bitemporal wasting)


Neck Exam:  Positive: Supple; 


   Negative: JVD, thyromegaly


Chest Exam:  Positive: Wheezing (present), Diminished (at the right base)


Heart Exam:  Positive: Rate Normal, Regular Rhythm, Normal S1, Normal S2; 


   Negative: Murmurs, Rubs


Abdomen Exam:  Positive: BS Hyperactive, Soft; 


   Negative: Tenderness, Hepatospenomegaly


Extremity Exam:  Negative: Clubbing, Cyanosis, Edema





Assessment /Plan


Assessment


65 year old male with COPD h/o necrotizing pneumonia , s/p right lobectomy in 

2018 presented to the ED at the instruction of his pulmonologist for empyema 

with possible underlying malignancy in the right lower lobe. 





Right lower lobe Abscess


S/P CT guided biopsy: culture from fluid  2/4/21 grew Strep constallatus spp 

phary 


Pathology pending. 


As per pulmonary it grew in size in 1 month no most likely it is infectious 

rather than malignant. 


Started on aggressive pulmonary toileting with nebs, EZ pap, chest PT, acapella.


Ceftriaxone with need long course. PICC line


ID  has been consulted. 


Appreciate pulmonary consultation





COPD with emphysema


with exacerbation due to large right lower lobe pneumonia/ mass


spiriva, duonebs, methyl pred





Diarrhea


likely antibiotic related


c diff negative. 





Protein calorie malnutrition 


albumin of 2.5, Loss of 12 lbs over the past 3 months bitemporal wasting.





Plan/VTE


VTE Prophylaxis Ordered?:  Yes





VS, I&O, 24H, Fishbone


Vital Signs/I&O





Vital Signs








  Date Time  Temp Pulse Resp B/P (MAP) Pulse Ox O2 Delivery O2 Flow Rate FiO2


 


2/13/21 06:00 97.6 102 17 120/73 (89) 94 Room Air  














I&O- Last 24 Hours up to 6 AM 


 


 2/13/21





 05:59


 


Intake Total 1450 ml


 


Output Total 700 ml


 


Balance 750 ml











Laboratory Data


24H LABS


Laboratory Tests 2


2/13/21 05:34: 


Immature Granulocyte % (Auto) 1.3, Neutrophils (%) (Auto) 89.6H, Lymphocytes (%)

(Auto) 4.3L, Monocytes (%) (Auto) 4.6, Eosinophils (%) (Auto) 0.0, Basophils (%)

(Auto) 0.2, Neutrophils # (Auto) 15.1H, Lymphocytes # (Auto) 0.7L, Monocytes # 

(Auto) 0.8, Eosinophils # (Auto) 0.0, Basophils # (Auto) 0.0, Nucleated Red 

Blood Cells % (auto) 0.0, Anion Gap 8, Glomerular Filtration Rate > 60.0, 

Calcium Level 9.1


CBC/BMP


Laboratory Tests


2/13/21 05:34








Microbiology





Microbiology


2/10/21 Blood Culture - Preliminary, Resulted


          No Growth after 48 hours. All Specime...


2/10/21 Blood Culture - Preliminary, Resulted


          No Growth after 48 hours. All Specime...


2/10/21 Respiratory Virus Panel (PCR) (LIT) - Final, Complete











SANTI GALLARDO MD                   Feb 13, 2021 10:08

## 2021-02-14 VITALS — DIASTOLIC BLOOD PRESSURE: 69 MMHG | SYSTOLIC BLOOD PRESSURE: 144 MMHG

## 2021-02-14 VITALS — DIASTOLIC BLOOD PRESSURE: 81 MMHG | SYSTOLIC BLOOD PRESSURE: 149 MMHG

## 2021-02-14 VITALS — DIASTOLIC BLOOD PRESSURE: 82 MMHG | SYSTOLIC BLOOD PRESSURE: 148 MMHG

## 2021-02-14 LAB
BASOPHILS # BLD AUTO: 0 10^3/UL (ref 0–0.2)
BASOPHILS NFR BLD AUTO: 0.2 % (ref 0–1)
BUN SERPL-MCNC: 38 MG/DL (ref 7–18)
CALCIUM SERPL-MCNC: 8.7 MG/DL (ref 8.8–10.2)
CHLORIDE SERPL-SCNC: 111 MEQ/L (ref 98–107)
CO2 SERPL-SCNC: 24 MEQ/L (ref 21–32)
CREAT SERPL-MCNC: 0.88 MG/DL (ref 0.7–1.3)
EOSINOPHIL # BLD AUTO: 0 10^3/UL (ref 0–0.5)
EOSINOPHIL NFR BLD AUTO: 0 % (ref 0–3)
GFR SERPL CREATININE-BSD FRML MDRD: > 60 ML/MIN/{1.73_M2} (ref 49–?)
GLUCOSE SERPL-MCNC: 161 MG/DL (ref 70–100)
HCT VFR BLD AUTO: 37.8 % (ref 42–52)
HGB BLD-MCNC: 12.9 G/DL (ref 13.5–17.5)
LYMPHOCYTES # BLD AUTO: 0.5 10^3/UL (ref 1.5–5)
LYMPHOCYTES NFR BLD AUTO: 4.1 % (ref 24–44)
MCH RBC QN AUTO: 32.4 PG (ref 27–33)
MCHC RBC AUTO-ENTMCNC: 34.1 G/DL (ref 32–36.5)
MCV RBC AUTO: 95 FL (ref 80–96)
MONOCYTES # BLD AUTO: 0.6 10^3/UL (ref 0–0.8)
MONOCYTES NFR BLD AUTO: 4.6 % (ref 2–8)
NEUTROPHILS # BLD AUTO: 11.7 10^3/UL (ref 1.5–8.5)
NEUTROPHILS NFR BLD AUTO: 89.9 % (ref 36–66)
PLATELET # BLD AUTO: 297 10^3/UL (ref 150–450)
POTASSIUM SERPL-SCNC: 4.4 MEQ/L (ref 3.5–5.1)
RBC # BLD AUTO: 3.98 10^6/UL (ref 4.3–6.1)
SODIUM SERPL-SCNC: 143 MEQ/L (ref 136–145)
WBC # BLD AUTO: 13 10^3/UL (ref 4–10)

## 2021-02-14 RX ADMIN — SODIUM CHLORIDE PRN ML: 9 INJECTION, SOLUTION INTRAMUSCULAR; INTRAVENOUS; SUBCUTANEOUS at 12:09

## 2021-02-14 RX ADMIN — Medication PRN UNITS: at 12:09

## 2021-02-14 RX ADMIN — SODIUM CHLORIDE SCH ML: 9 INJECTION, SOLUTION INTRAMUSCULAR; INTRAVENOUS; SUBCUTANEOUS at 18:35

## 2021-02-14 RX ADMIN — SODIUM CHLORIDE SCH ML: 9 INJECTION, SOLUTION INTRAMUSCULAR; INTRAVENOUS; SUBCUTANEOUS at 04:56

## 2021-02-14 RX ADMIN — METHYLPREDNISOLONE SODIUM SUCCINATE SCH MG: 125 INJECTION, POWDER, FOR SOLUTION INTRAMUSCULAR; INTRAVENOUS at 20:19

## 2021-02-14 RX ADMIN — Medication SCH UNITS: at 04:56

## 2021-02-14 RX ADMIN — IPRATROPIUM BROMIDE AND ALBUTEROL SULFATE SCH ML: .5; 3 SOLUTION RESPIRATORY (INHALATION) at 19:20

## 2021-02-14 RX ADMIN — METHYLPREDNISOLONE SODIUM SUCCINATE SCH MG: 125 INJECTION, POWDER, FOR SOLUTION INTRAMUSCULAR; INTRAVENOUS at 04:55

## 2021-02-14 RX ADMIN — Medication SCH UNITS: at 18:35

## 2021-02-14 RX ADMIN — Medication PRN UNITS: at 20:19

## 2021-02-14 RX ADMIN — IPRATROPIUM BROMIDE AND ALBUTEROL SULFATE SCH ML: .5; 3 SOLUTION RESPIRATORY (INHALATION) at 07:15

## 2021-02-14 RX ADMIN — METHYLPREDNISOLONE SODIUM SUCCINATE SCH MG: 125 INJECTION, POWDER, FOR SOLUTION INTRAMUSCULAR; INTRAVENOUS at 12:08

## 2021-02-14 RX ADMIN — ENOXAPARIN SODIUM SCH MG: 40 INJECTION SUBCUTANEOUS at 09:13

## 2021-02-14 RX ADMIN — CEFTRIAXONE SCH MLS/HR: 2 INJECTION, POWDER, FOR SOLUTION INTRAMUSCULAR; INTRAVENOUS at 12:08

## 2021-02-14 RX ADMIN — IPRATROPIUM BROMIDE AND ALBUTEROL SULFATE SCH ML: .5; 3 SOLUTION RESPIRATORY (INHALATION) at 11:06

## 2021-02-14 RX ADMIN — IPRATROPIUM BROMIDE AND ALBUTEROL SULFATE SCH ML: .5; 3 SOLUTION RESPIRATORY (INHALATION) at 15:37

## 2021-02-14 RX ADMIN — TIOTROPIUM BROMIDE SCH INHALATION: 18 CAPSULE ORAL; RESPIRATORY (INHALATION) at 07:15

## 2021-02-14 RX ADMIN — IPRATROPIUM BROMIDE AND ALBUTEROL SULFATE SCH ML: .5; 3 SOLUTION RESPIRATORY (INHALATION) at 03:56

## 2021-02-14 RX ADMIN — CALCIUM CARBONATE (ANTACID) CHEW TAB 500 MG PRN MG: 500 CHEW TAB at 17:13

## 2021-02-14 RX ADMIN — SODIUM CHLORIDE PRN ML: 9 INJECTION, SOLUTION INTRAMUSCULAR; INTRAVENOUS; SUBCUTANEOUS at 20:19

## 2021-02-14 NOTE — IPNPDOC
Subjective


Date Seen


The patient was seen on 2/14/21.





Subjective


Chief Complaint/HPI


Patient appears cheerful. Reports diarrhea has stopped. He is eating better and 

feeling stronger. He reported that he walked 6 times [place] [employer] [place]

yesterday.





Objective


Physical Examination


General Exam:  Positive: Alert, Cooperative, No Acute Distress, Other (cachexia.

)


Eye Exam:  Positive: PERRLA, Conjunctiva & lids normal, EOMI; 


   Negative: Sclera icteric


ENT Exam:  Positive: Atraumatic, Mucous membr. moist/pink, Pharynx Normal, Other

ENT (bitemporal wasting)


Neck Exam:  Positive: Supple; 


   Negative: JVD, thyromegaly


Chest Exam:  Positive: Wheezing (present), Diminished (at the right base)


Heart Exam:  Positive: Rate Normal, Regular Rhythm, Normal S1, Normal S2; 


   Negative: Murmurs, Rubs


Abdomen Exam:  Positive: BS Hyperactive, Soft; 


   Negative: Tenderness, Hepatospenomegaly


Extremity Exam:  Negative: Clubbing, Cyanosis, Edema





Assessment /Plan


Assessment


65 year old male with COPD h/o necrotizing pneumonia , s/p right lobectomy in 

2018 presented to the ED at the instruction of his pulmonologist for empyema 

with possible underlying malignancy in the right lower lobe. 





Right lower lobe Abscess


S/P CT guided biopsy: culture from fluid  2/4/21 grew Strep constallatus spp 

phary 


Pathology pending. 


As per pulmonary it grew in size in 1 month no most likely it is infectious 

rather than malignant. 


Started on aggressive pulmonary toileting with nebs, EZ pap, chest PT, acapella.


Ceftriaxone with need long course. PICC line


Appreciate pulmonary consultation


Appreciate ID consultation.





COPD with emphysema


with exacerbation due to large right lower lobe pneumonia/ mass


spiriva, duonebs, methyl pred





Diarrhea


likely antibiotic related


c diff negative. 


resolved





Protein calorie malnutrition 


albumin of 2.5, Loss of 12 lbs over the past 3 months bitemporal wasting.





Plan/VTE


VTE Prophylaxis Ordered?:  Yes





VS, I&O, 24H, Fishbone


Vital Signs/I&O





Vital Signs








  Date Time  Temp Pulse Resp B/P (MAP) Pulse Ox O2 Delivery O2 Flow Rate FiO2


 


2/14/21 06:00 97.1 100 19 144/69 (94) 95 Room Air  














I&O- Last 24 Hours up to 6 AM 


 


 2/14/21





 06:00


 


Intake Total 2125 ml


 


Output Total 0 ml


 


Balance 2125 ml











Laboratory Data


24H LABS


Laboratory Tests 2


2/14/21 06:04: 


Immature Granulocyte % (Auto) 1.2, Neutrophils (%) (Auto) 89.9H, Lymphocytes (%)

(Auto) 4.1L, Monocytes (%) (Auto) 4.6, Eosinophils (%) (Auto) 0.0, Basophils (%)

(Auto) 0.2, Neutrophils # (Auto) 11.7H, Lymphocytes # (Auto) 0.5L, Monocytes # 

(Auto) 0.6, Eosinophils # (Auto) 0.0, Basophils # (Auto) 0.0, Nucleated Red 

Blood Cells % (auto) 0.0, Anion Gap 8, Glomerular Filtration Rate > 60.0, 

Calcium Level 8.7L


CBC/BMP


Laboratory Tests


2/14/21 06:04








Microbiology





Microbiology


2/10/21 Blood Culture - Preliminary, Resulted


          No Growth after 72 hours. All specime...


2/10/21 Blood Culture - Preliminary, Resulted


          No Growth after 72 hours. All specime...


2/10/21 Respiratory Virus Panel (PCR) (LIT) - Final, Complete











SANTI GALLARDO MD                   Feb 14, 2021 08:50

## 2021-02-15 VITALS — SYSTOLIC BLOOD PRESSURE: 148 MMHG | DIASTOLIC BLOOD PRESSURE: 82 MMHG

## 2021-02-15 VITALS — DIASTOLIC BLOOD PRESSURE: 83 MMHG | SYSTOLIC BLOOD PRESSURE: 149 MMHG

## 2021-02-15 VITALS — DIASTOLIC BLOOD PRESSURE: 84 MMHG | SYSTOLIC BLOOD PRESSURE: 152 MMHG

## 2021-02-15 LAB
BASOPHILS # BLD AUTO: 0 10^3/UL (ref 0–0.2)
BASOPHILS NFR BLD AUTO: 0.3 % (ref 0–1)
BUN SERPL-MCNC: 35 MG/DL (ref 7–18)
CALCIUM SERPL-MCNC: 9 MG/DL (ref 8.8–10.2)
CHLORIDE SERPL-SCNC: 109 MEQ/L (ref 98–107)
CO2 SERPL-SCNC: 24 MEQ/L (ref 21–32)
CREAT SERPL-MCNC: 0.85 MG/DL (ref 0.7–1.3)
CRP SERPL-MCNC: 0.91 MG/DL (ref 0–0.3)
EOSINOPHIL # BLD AUTO: 0 10^3/UL (ref 0–0.5)
EOSINOPHIL NFR BLD AUTO: 0 % (ref 0–3)
GFR SERPL CREATININE-BSD FRML MDRD: > 60 ML/MIN/{1.73_M2} (ref 49–?)
GLUCOSE SERPL-MCNC: 189 MG/DL (ref 70–100)
HCT VFR BLD AUTO: 38.9 % (ref 42–52)
HGB BLD-MCNC: 13 G/DL (ref 13.5–17.5)
LYMPHOCYTES # BLD AUTO: 0.4 10^3/UL (ref 1.5–5)
LYMPHOCYTES NFR BLD AUTO: 3.4 % (ref 24–44)
MCH RBC QN AUTO: 31.7 PG (ref 27–33)
MCHC RBC AUTO-ENTMCNC: 33.4 G/DL (ref 32–36.5)
MCV RBC AUTO: 94.9 FL (ref 80–96)
MONOCYTES # BLD AUTO: 0.4 10^3/UL (ref 0–0.8)
MONOCYTES NFR BLD AUTO: 4.2 % (ref 2–8)
NEUTROPHILS # BLD AUTO: 9.4 10^3/UL (ref 1.5–8.5)
NEUTROPHILS NFR BLD AUTO: 90.2 % (ref 36–66)
PLATELET # BLD AUTO: 303 10^3/UL (ref 150–450)
POTASSIUM SERPL-SCNC: 4.2 MEQ/L (ref 3.5–5.1)
RBC # BLD AUTO: 4.1 10^6/UL (ref 4.3–6.1)
SODIUM SERPL-SCNC: 140 MEQ/L (ref 136–145)
WBC # BLD AUTO: 10.5 10^3/UL (ref 4–10)

## 2021-02-15 RX ADMIN — METHYLPREDNISOLONE SODIUM SUCCINATE SCH MG: 125 INJECTION, POWDER, FOR SOLUTION INTRAMUSCULAR; INTRAVENOUS at 03:10

## 2021-02-15 RX ADMIN — Medication PRN UNITS: at 03:10

## 2021-02-15 RX ADMIN — IPRATROPIUM BROMIDE AND ALBUTEROL SULFATE SCH ML: .5; 3 SOLUTION RESPIRATORY (INHALATION) at 03:53

## 2021-02-15 RX ADMIN — SODIUM CHLORIDE PRN ML: 9 INJECTION, SOLUTION INTRAMUSCULAR; INTRAVENOUS; SUBCUTANEOUS at 12:30

## 2021-02-15 RX ADMIN — SODIUM CHLORIDE SCH ML: 9 INJECTION, SOLUTION INTRAMUSCULAR; INTRAVENOUS; SUBCUTANEOUS at 05:20

## 2021-02-15 RX ADMIN — CALCIUM CARBONATE (ANTACID) CHEW TAB 500 MG PRN MG: 500 CHEW TAB at 07:59

## 2021-02-15 RX ADMIN — Medication PRN UNITS: at 12:30

## 2021-02-15 RX ADMIN — IPRATROPIUM BROMIDE AND ALBUTEROL SULFATE SCH ML: .5; 3 SOLUTION RESPIRATORY (INHALATION) at 11:17

## 2021-02-15 RX ADMIN — SODIUM CHLORIDE PRN ML: 9 INJECTION, SOLUTION INTRAMUSCULAR; INTRAVENOUS; SUBCUTANEOUS at 03:10

## 2021-02-15 RX ADMIN — ENOXAPARIN SODIUM SCH MG: 40 INJECTION SUBCUTANEOUS at 08:58

## 2021-02-15 RX ADMIN — CEFTRIAXONE SCH MLS/HR: 2 INJECTION, POWDER, FOR SOLUTION INTRAMUSCULAR; INTRAVENOUS at 11:37

## 2021-02-15 RX ADMIN — IPRATROPIUM BROMIDE AND ALBUTEROL SULFATE SCH ML: .5; 3 SOLUTION RESPIRATORY (INHALATION) at 00:00

## 2021-02-15 RX ADMIN — TIOTROPIUM BROMIDE SCH INHALATION: 18 CAPSULE ORAL; RESPIRATORY (INHALATION) at 07:22

## 2021-02-15 RX ADMIN — Medication SCH UNITS: at 05:21

## 2021-02-15 RX ADMIN — IPRATROPIUM BROMIDE AND ALBUTEROL SULFATE SCH ML: .5; 3 SOLUTION RESPIRATORY (INHALATION) at 07:22

## 2021-02-15 RX ADMIN — IPRATROPIUM BROMIDE AND ALBUTEROL SULFATE SCH ML: .5; 3 SOLUTION RESPIRATORY (INHALATION) at 15:16

## 2021-02-15 NOTE — DS.PDOC
Discharge Summary


General


Date of Admission


Feb 10, 2021 at 19:12


Date of Discharge


2/15/21





Discharge Summary


PROCEDURES PERFORMED DURING STAY: PICC line insertion





DISCHARGE DIAGNOSES:


Right lower lobe Abscess by streptococcus


COPD with Emphysema


Oral antibiotic induced diarrhea


Protein Calorie malnutrition





COMPLICATIONS/CHIEF COMPLAINT: Copd,Rll Pneumonia.





HOSPITAL COURSE: 65 year old male with COPD h/o necrotizing pneumonia , s/p 

right lobectomy in 2018 presented to the ED at the instruction of his 

pulmonologist for empyema with possible underlying malignancy in the right lower

lobe. 





Right lower lobe Abscess


S/P CT guided biopsy: culture from fluid  2/4/21 grew Strep constallatus spp 

phary 


Pathology pending. 


As per pulmonary it grew in size in 1 month no most likely it is infectious rath

er than malignant. 


Started on aggressive pulmonary toileting with nebs, EZ pap, chest PT, acapella.


Ceftriaxone with need long course. PICC line in place. 


Appreciate pulmonary consultation


Appreciate ID consultation.





COPD with emphysema


with exacerbation due to large right lower lobe pneumonia/ mass


spiriva, duonebs, prednisone taper. 





Diarrhea


likely antibiotic related


c diff negative. 


resolved





Protein calorie malnutrition 


albumin of 2.5, Loss of 12 lbs over the past 3 months bitemporal wasting.





DISCHARGE MEDICATIONS: Please see below.


 


ALLERGIES: Please see below.





PHYSICAL EXAMINATION ON DISCHARGE:


VITAL SIGNS: Please see below.


General Exam:  Positive: Alert, Cooperative, No Acute Distress, Other (cachexia.

)


Eye Exam:  Positive: PERRLA, Conjunctiva & lids normal, EOMI; 


   Negative: Sclera icteric


ENT Exam:  Positive: Atraumatic, Mucous membr. moist/pink, Pharynx Normal, Other

ENT (bitemporal wasting)


Neck Exam:  Positive: Supple; 


   Negative: JVD, thyromegaly


Chest Exam:  Positive: Wheezing (present), Diminished (at the right base)


Heart Exam:  Positive: Rate Normal, Regular Rhythm, Normal S1, Normal S2; 


   Negative: Murmurs, Rubs


Abdomen Exam:  Positive: BS Hyperactive, Soft; 


   Negative: Tenderness, Hepatosplenomegaly


Extremity Exam:  Negative: Clubbing, Cyanosis, Edema





LABORATORY DATA: Please see below.





IMAGING: 


CT chest: 


Increasing multilobulated masslike consolidation involving the right lower lobe 

now


extending towards the hilum with small pleural effusion and increased 

interstitial


thickening.  Findings are most compatible with malignancy and require further 

investigation.





ACTIVITY: [As tolerated].





DIET: As tolerated





DISPOSITION: Home with IV antibiotics





DISCHARGE INSTRUCTIONS:


Follow up Dr Echeverria in 2 weeks, 


Dr Downs in 1 month





ITEMS TO FOLLOWUP ON ON OUTPATIENT:


CBC, CMP, ESR, CRP every week while on antibiotics. 





DISCHARGE CONDITION: [Stable].





TIME SPENT ON DISCHARGE: 35 minutes.





Vital Signs/I&Os





Vital Signs








  Date Time  Temp Pulse Resp B/P (MAP) Pulse Ox O2 Delivery O2 Flow Rate FiO2


 


2/15/21 08:00 97.6 104 20 149/83 (105) 92 Room Air  














I&O- Last 24 Hours up to 6 AM 


 


 2/15/21





 06:00


 


Intake Total 1800 ml


 


Output Total 1150 ml


 


Balance 650 ml











Laboratory Data


Labs 24H


Laboratory Tests 2


2/15/21 05:36: 


Immature Granulocyte % (Auto) 1.9, Neutrophils (%) (Auto) 90.2H, Lymphocytes (%)

(Auto) 3.4L, Monocytes (%) (Auto) 4.2, Eosinophils (%) (Auto) 0.0, Basophils (%)

(Auto) 0.3, Neutrophils # (Auto) 9.4H, Lymphocytes # (Auto) 0.4L, Monocytes # 

(Auto) 0.4, Eosinophils # (Auto) 0.0, Basophils # (Auto) 0.0, Nucleated Red 

Blood Cells % (auto) 0.0, Anion Gap 7L, Glomerular Filtration Rate > 60.0, 

Calcium Level 9.0


CBC/BMP


Laboratory Tests


2/15/21 05:36











Microbiology





Microbiology


2/10/21 Blood Culture - Preliminary, Resulted


          No Growth after 72 hours. All specime...


2/10/21 Blood Culture - Preliminary, Resulted


          No Growth after 72 hours. All specime...


2/10/21 Respiratory Virus Panel (PCR) (LIT) - Final, Complete





Discharge Medications


Scheduled


Cholecalciferol (Vitamin D3) (Vitamin D3) 1,000 Unit Tablet, 1,000 UNITS PO QHS,

(Reported)


Pantoprazole Sodium (Pantoprazole Sodium) 40 Mg Tablet., 40 MG PO DAILY


Prednisone (Prednisone) 10 Mg Tablet, 10 MG PO TAPER


   Take 4 tabs daily x 3 days, then 3 tabs daily x 3 days, then 2 tabs daily x 3

days, then 1 tab daily x 3 days and stop 


Tiotropium Bromide (Spiriva Respimat) 2.5 Mcg/Act Spr, 2 PUFFS INH DAILY, 

(Reported)





Scheduled PRN


Albuterol Sulfate (Proair Hfa) 108 Mcg/Act Aer, 2 PUFF INH QID PRN for SHORTNESS

OF BREATH, (Reported)


Calcium Carbonate (Calcium Carbonate) 200 Mg Tab.chew, 1,000 MG PO Q6HP PRN for 

HEARTBURN


Ibuprofen (Ibuprofen) 200 Mg Tab, 400 MG PO QID PRN for PAIN, (Reported)





Allergies


Coded Allergies:  


     No Known Allergies (Verified , 5/30/17)











SANTI GALLARDO MD                   Feb 15, 2021 13:03

## 2021-02-23 ENCOUNTER — HOSPITAL ENCOUNTER (OUTPATIENT)
Dept: HOSPITAL 53 - M LAB REF | Age: 66
End: 2021-02-23
Attending: INTERNAL MEDICINE
Payer: MEDICARE

## 2021-02-23 DIAGNOSIS — J85.1: Primary | ICD-10-CM

## 2021-02-23 DIAGNOSIS — J44.9: ICD-10-CM

## 2021-02-23 LAB
ALBUMIN SERPL BCG-MCNC: 3.2 GM/DL (ref 3.2–5.2)
ALT SERPL W P-5'-P-CCNC: 39 U/L (ref 12–78)
BILIRUB SERPL-MCNC: 0.3 MG/DL (ref 0.2–1)
BUN SERPL-MCNC: 27 MG/DL (ref 7–18)
CALCIUM SERPL-MCNC: 9 MG/DL (ref 8.8–10.2)
CHLORIDE SERPL-SCNC: 105 MEQ/L (ref 98–107)
CO2 SERPL-SCNC: 26 MEQ/L (ref 21–32)
CREAT SERPL-MCNC: 0.96 MG/DL (ref 0.7–1.3)
CRP SERPL-MCNC: 0.53 MG/DL (ref 0–0.3)
ERYTHROCYTE [SEDIMENTATION RATE] IN BLOOD BY WESTERGREN METHOD: 4 MM/HR (ref 0–20)
GFR SERPL CREATININE-BSD FRML MDRD: > 60 ML/MIN/{1.73_M2} (ref 49–?)
GLUCOSE SERPL-MCNC: 104 MG/DL (ref 70–100)
HCT VFR BLD AUTO: 44.6 % (ref 42–52)
HGB BLD-MCNC: 14.7 G/DL (ref 13.5–17.5)
MCH RBC QN AUTO: 31.5 PG (ref 27–33)
MCHC RBC AUTO-ENTMCNC: 33 G/DL (ref 32–36.5)
MCV RBC AUTO: 95.7 FL (ref 80–96)
PLATELET # BLD AUTO: 227 10^3/UL (ref 150–450)
POTASSIUM SERPL-SCNC: 4.2 MEQ/L (ref 3.5–5.1)
PROT SERPL-MCNC: 6.4 GM/DL (ref 6.4–8.2)
RBC # BLD AUTO: 4.66 10^6/UL (ref 4.3–6.1)
SODIUM SERPL-SCNC: 140 MEQ/L (ref 136–145)
WBC # BLD AUTO: 13.5 10^3/UL (ref 4–10)

## 2021-03-02 ENCOUNTER — HOSPITAL ENCOUNTER (OUTPATIENT)
Dept: HOSPITAL 53 - M LAB REF | Age: 66
End: 2021-03-02
Attending: INTERNAL MEDICINE
Payer: MEDICARE

## 2021-03-02 DIAGNOSIS — J85.1: Primary | ICD-10-CM

## 2021-03-02 DIAGNOSIS — J44.9: ICD-10-CM

## 2021-03-02 LAB
ALBUMIN SERPL BCG-MCNC: 3.1 GM/DL (ref 3.2–5.2)
ALT SERPL W P-5'-P-CCNC: 26 U/L (ref 12–78)
BILIRUB SERPL-MCNC: 0.4 MG/DL (ref 0.2–1)
BUN SERPL-MCNC: 22 MG/DL (ref 7–18)
CALCIUM SERPL-MCNC: 8.5 MG/DL (ref 8.8–10.2)
CHLORIDE SERPL-SCNC: 107 MEQ/L (ref 98–107)
CO2 SERPL-SCNC: 27 MEQ/L (ref 21–32)
CREAT SERPL-MCNC: 0.98 MG/DL (ref 0.7–1.3)
ERYTHROCYTE [SEDIMENTATION RATE] IN BLOOD BY WESTERGREN METHOD: 16 MM/HR (ref 0–20)
GFR SERPL CREATININE-BSD FRML MDRD: > 60 ML/MIN/{1.73_M2} (ref 49–?)
GLUCOSE SERPL-MCNC: 128 MG/DL (ref 70–100)
HCT VFR BLD AUTO: 45.3 % (ref 42–52)
HGB BLD-MCNC: 14.5 G/DL (ref 13.5–17.5)
MCH RBC QN AUTO: 31 PG (ref 27–33)
MCHC RBC AUTO-ENTMCNC: 32 G/DL (ref 32–36.5)
MCV RBC AUTO: 96.8 FL (ref 80–96)
PLATELET # BLD AUTO: 139 10^3/UL (ref 150–450)
POTASSIUM SERPL-SCNC: 4.3 MEQ/L (ref 3.5–5.1)
PROT SERPL-MCNC: 6.3 GM/DL (ref 6.4–8.2)
RBC # BLD AUTO: 4.68 10^6/UL (ref 4.3–6.1)
SODIUM SERPL-SCNC: 139 MEQ/L (ref 136–145)
WBC # BLD AUTO: 6.6 10^3/UL (ref 4–10)

## 2021-03-09 ENCOUNTER — HOSPITAL ENCOUNTER (OUTPATIENT)
Dept: HOSPITAL 53 - M LAB REF | Age: 66
End: 2021-03-09
Attending: INTERNAL MEDICINE
Payer: MEDICARE

## 2021-03-09 DIAGNOSIS — J44.9: ICD-10-CM

## 2021-03-09 DIAGNOSIS — J85.1: Primary | ICD-10-CM

## 2021-03-09 LAB
ALBUMIN SERPL BCG-MCNC: 3.3 GM/DL (ref 3.2–5.2)
ALT SERPL W P-5'-P-CCNC: 19 U/L (ref 12–78)
BILIRUB SERPL-MCNC: 0.3 MG/DL (ref 0.2–1)
BUN SERPL-MCNC: 19 MG/DL (ref 7–18)
CALCIUM SERPL-MCNC: 9.3 MG/DL (ref 8.8–10.2)
CHLORIDE SERPL-SCNC: 110 MEQ/L (ref 98–107)
CO2 SERPL-SCNC: 26 MEQ/L (ref 21–32)
CREAT SERPL-MCNC: 1.02 MG/DL (ref 0.7–1.3)
CRP SERPL-MCNC: 1.92 MG/DL (ref 0–0.3)
ERYTHROCYTE [SEDIMENTATION RATE] IN BLOOD BY WESTERGREN METHOD: 20 MM/HR (ref 0–20)
GFR SERPL CREATININE-BSD FRML MDRD: > 60 ML/MIN/{1.73_M2} (ref 49–?)
GLUCOSE SERPL-MCNC: 80 MG/DL (ref 70–100)
HCT VFR BLD AUTO: 43.5 % (ref 42–52)
HGB BLD-MCNC: 14.3 G/DL (ref 13.5–17.5)
MCH RBC QN AUTO: 31.4 PG (ref 27–33)
MCHC RBC AUTO-ENTMCNC: 32.9 G/DL (ref 32–36.5)
MCV RBC AUTO: 95.4 FL (ref 80–96)
PLATELET # BLD AUTO: 241 10^3/UL (ref 150–450)
POTASSIUM SERPL-SCNC: 4.2 MEQ/L (ref 3.5–5.1)
PROT SERPL-MCNC: 6.7 GM/DL (ref 6.4–8.2)
RBC # BLD AUTO: 4.56 10^6/UL (ref 4.3–6.1)
SODIUM SERPL-SCNC: 141 MEQ/L (ref 136–145)
WBC # BLD AUTO: 5.2 10^3/UL (ref 4–10)

## 2021-03-15 ENCOUNTER — HOSPITAL ENCOUNTER (OUTPATIENT)
Dept: HOSPITAL 53 - M RAD | Age: 66
End: 2021-03-15
Attending: INTERNAL MEDICINE
Payer: MEDICARE

## 2021-03-15 DIAGNOSIS — J86.9: Primary | ICD-10-CM

## 2021-03-15 NOTE — REP
INDICATION:

EMPHASEMA LUNG



COMPARISON:

02/10/2021



TECHNIQUE:

Axial noncontrast images from the thoracic inlet to the upper abdomen with coronal and

sagittal reformations.



This CT examination was performed using the following dose reduction techniques:

Automated exposure control, adjustment of mA and/or kv according to the patient's

size, and use of iterative reconstruction technique.



FINDINGS:

Advanced chronic COPD/emphysematous changes with scattered scarring again noted and

essentially unchanged.  The large masslike consolidation involving the right lower

lobe and associated pleural reaction on prior examination is considerably decreased in

size, but still prevalent.  No new areas of consolidation or mass noted.



Mediastinum demonstrates atherosclerotic changes to the thoracic aorta and coronary

arteries similar to prior examination without aortic aneurysm or cardiomegaly.  No

pericardial effusion.  Nonspecific mediastinal lymph nodes measure up to approximately

9 mm short axis diameter.  Osseous structures are intact.  Limited upper abdomen

demonstrates stable bilateral adrenal adenomas.



IMPRESSION:

1. Large masslike consolidation in the right lower lobe has decreased from prior

examination.  Continued follow-up to resolution is warranted.

2. Stable chronic advanced COPD/emphysematous changes with scattered scarring.





<Electronically signed by Dinh Spann > 03/15/21 1118

## 2021-03-16 ENCOUNTER — HOSPITAL ENCOUNTER (OUTPATIENT)
Dept: HOSPITAL 53 - M LAB REF | Age: 66
End: 2021-03-16
Attending: INTERNAL MEDICINE
Payer: MEDICARE

## 2021-03-16 DIAGNOSIS — J85.1: Primary | ICD-10-CM

## 2021-03-16 DIAGNOSIS — J44.9: ICD-10-CM

## 2021-03-16 LAB
ALBUMIN SERPL BCG-MCNC: 3.6 GM/DL (ref 3.2–5.2)
ALT SERPL W P-5'-P-CCNC: 20 U/L (ref 12–78)
BILIRUB SERPL-MCNC: 0.3 MG/DL (ref 0.2–1)
BUN SERPL-MCNC: 17 MG/DL (ref 7–18)
CALCIUM SERPL-MCNC: 9 MG/DL (ref 8.8–10.2)
CHLORIDE SERPL-SCNC: 109 MEQ/L (ref 98–107)
CO2 SERPL-SCNC: 27 MEQ/L (ref 21–32)
CREAT SERPL-MCNC: 0.95 MG/DL (ref 0.7–1.3)
CRP SERPL-MCNC: 1.12 MG/DL (ref 0–0.3)
ERYTHROCYTE [SEDIMENTATION RATE] IN BLOOD BY WESTERGREN METHOD: 12 MM/HR (ref 0–20)
GFR SERPL CREATININE-BSD FRML MDRD: > 60 ML/MIN/{1.73_M2} (ref 49–?)
GLUCOSE SERPL-MCNC: 112 MG/DL (ref 70–100)
HCT VFR BLD AUTO: 44.5 % (ref 42–52)
HGB BLD-MCNC: 15.1 G/DL (ref 13.5–17.5)
MCH RBC QN AUTO: 32.1 PG (ref 27–33)
MCHC RBC AUTO-ENTMCNC: 33.9 G/DL (ref 32–36.5)
MCV RBC AUTO: 94.5 FL (ref 80–96)
PLATELET # BLD AUTO: 229 10^3/UL (ref 150–450)
POTASSIUM SERPL-SCNC: 4.2 MEQ/L (ref 3.5–5.1)
PROT SERPL-MCNC: 6.7 GM/DL (ref 6.4–8.2)
RBC # BLD AUTO: 4.71 10^6/UL (ref 4.3–6.1)
SODIUM SERPL-SCNC: 141 MEQ/L (ref 136–145)
WBC # BLD AUTO: 5.3 10^3/UL (ref 4–10)

## 2021-03-22 ENCOUNTER — HOSPITAL ENCOUNTER (OUTPATIENT)
Dept: HOSPITAL 53 - M LAB REF | Age: 66
End: 2021-03-22
Attending: INTERNAL MEDICINE
Payer: MEDICARE

## 2021-03-22 DIAGNOSIS — J85.1: Primary | ICD-10-CM

## 2021-03-22 DIAGNOSIS — J44.9: ICD-10-CM

## 2021-03-22 LAB
ALBUMIN SERPL BCG-MCNC: 3.6 GM/DL (ref 3.2–5.2)
ALT SERPL W P-5'-P-CCNC: 21 U/L (ref 12–78)
BILIRUB SERPL-MCNC: 0.4 MG/DL (ref 0.2–1)
BUN SERPL-MCNC: 21 MG/DL (ref 7–18)
CALCIUM SERPL-MCNC: 9.3 MG/DL (ref 8.8–10.2)
CHLORIDE SERPL-SCNC: 111 MEQ/L (ref 98–107)
CO2 SERPL-SCNC: 26 MEQ/L (ref 21–32)
CREAT SERPL-MCNC: 0.96 MG/DL (ref 0.7–1.3)
CRP SERPL-MCNC: 1.27 MG/DL (ref 0–0.3)
ERYTHROCYTE [SEDIMENTATION RATE] IN BLOOD BY WESTERGREN METHOD: 12 MM/HR (ref 0–20)
GFR SERPL CREATININE-BSD FRML MDRD: > 60 ML/MIN/{1.73_M2} (ref 49–?)
GLUCOSE SERPL-MCNC: 92 MG/DL (ref 70–100)
HCT VFR BLD AUTO: 43.6 % (ref 42–52)
HGB BLD-MCNC: 14.8 G/DL (ref 13.5–17.5)
MCH RBC QN AUTO: 32.2 PG (ref 27–33)
MCHC RBC AUTO-ENTMCNC: 33.9 G/DL (ref 32–36.5)
MCV RBC AUTO: 94.8 FL (ref 80–96)
PLATELET # BLD AUTO: 236 10^3/UL (ref 150–450)
POTASSIUM SERPL-SCNC: 4.3 MEQ/L (ref 3.5–5.1)
PROT SERPL-MCNC: 6.8 GM/DL (ref 6.4–8.2)
RBC # BLD AUTO: 4.6 10^6/UL (ref 4.3–6.1)
SODIUM SERPL-SCNC: 142 MEQ/L (ref 136–145)
WBC # BLD AUTO: 5.9 10^3/UL (ref 4–10)

## 2021-04-28 ENCOUNTER — HOSPITAL ENCOUNTER (OUTPATIENT)
Dept: HOSPITAL 53 - M RAD | Age: 66
End: 2021-04-28
Attending: INTERNAL MEDICINE
Payer: MEDICARE

## 2021-04-28 DIAGNOSIS — R91.8: Primary | ICD-10-CM

## 2021-04-28 NOTE — REP
INDICATION:

ABN FINDINGS OF LUNG FIELD.



COMPARISON:

03/15/2021 as well as other prior exams.



TECHNIQUE:

CT chest performed without the use of intravenous contrast.  Sagittal and coronal

reconstruction images are performed.



FINDINGS:

Lungs: Diffuse bilateral emphysematous and fibrotic changes are again noted.  There is

a calcified granuloma in the right upper lobe.  Opacified right lower lobe bronchioles

are somewhat more aerated centrally.  There is significant improvement of the right

infrahilar band of consolidation adjacent to these bronchials.  There is no

significant change in the more peripheral posterior right lower lobe consolidation

peripherally.  Posterior subpleural nodular opacity in the left lower lobe is stable

measuring 8 mm.

Mediastinum: No gross adenopathy.  There is small calcified lymph node in the right

paratracheal region.

Stacey: No gross adenopathy.  There is a small calcified lymph node in the right hilum.

Axilla: No gross adenopathy.

Pleura: No effusion.

Heart: Not enlarged.

Thoracic aorta: No aneurysm.

Upper abdominal structures: Few tiny calcified granulomas are seen in the liver.

There are stable bilateral adrenal adenomas..  There is a small hiatal hernia.

Visualized osseous structures: There are mild degenerative changes of the spine

without compression deformity..



IMPRESSION:

Opacified right lower lobe bronchioles are more aerated than on the prior study.

There is significant improvement of the band of consolidation in the right infrahilar

region centrally.  There is no significant change in the more peripheral consolidation

in the posterior aspect of the right lower lobe.  Otherwise stable exam as discussed

above.





<Electronically signed by Ady Rosenberg > 04/28/21 1228

## 2021-08-02 ENCOUNTER — HOSPITAL ENCOUNTER (OUTPATIENT)
Dept: HOSPITAL 53 - M RAD | Age: 66
End: 2021-08-02
Attending: INTERNAL MEDICINE
Payer: MEDICARE

## 2021-08-02 DIAGNOSIS — R91.8: Primary | ICD-10-CM

## 2021-08-02 NOTE — REP
INDICATION:

ABN FINDING OF LUNG.



COMPARISON:

CT chest without IV contrast, 02/10/2021, 03/15/2021 and 04/28/2020.  None.



TECHNIQUE:

Imaging protocol: Computed tomography of the chest without contrast.

Contiguous axial projection images were obtained through the chest.

2D sagittal and coronal reconstructions were performed.

Radiation optimization: All CT scans at this facility use at least one of these

dose optimization techniques: automated exposure control; mA and/or kV

adjustment per patient size (includes targeted exams where dose is matched to

clinical indication); or iterative reconstruction.



FINDINGS:

Lower neck: The thyroid gland is normal.  There is no supraclavicular lymphadenopathy.



Mediastinum: There are benign calcified right paratracheal and right hilar lymph nodes.



Heart/thoracic aorta: The heart size is normal.  There is no pericardial effusion.

There is calcific vascular disease of the thoracic aorta and coronary arteries.



Upper abdomen: There is calcific vascular disease of the abdominal aorta.  There are

bilateral adrenal adenomas, measuring 2.1 x 2.0 cm, on the right, and 2.8 x 1.4 cm, on

the left.



Thoracic esophagus: There is a small hiatal hernia.



Chest wall and axilla: The soft tissues of the chest wall appear normal.  There is no

axillary lymphadenopathy.  There are no significant bony abnormalities of the chest.



Lung parenchyma: There is severe upper lobe predominant centrilobular emphysema.

There is stable apical pleuroparenchymal scarring, bilaterally.  There is a lateral

pleural-parenchymal scar in the upper lobe of the right lung.  There is diffuse

tubular bronchiectasis.  In the basilar segments of the lower lobes of both lungs,

there are a few containing mucus plugs.  There is peribronchial scarring/atelectasis

and residual pleural based atelectasis in the posterior basilar segment of the lower

lobe of the right lung.  There has been some improved aeration of the lower lobe the

right lung when compared with the prior exam.  There is stable linear scarring in the

posterior basilar segment of the lower lobe of the left lung.  There is a stable 6 x 3

mm peripheral soft tissue density nodule in the superior segment of the lower lobe of

the left lung, consistent with a pleural lymph node.  There is a benign calcified

granuloma in the upper lobe the right lung.  There are no pleural effusions.



IMPRESSION:

1.  Emphysema.

2.  Diffuse bronchiectasis with mucous plugging in lower lobe bronchi, bilaterally.

3.  There is residual peripheral airspace and peribronchial consolidation in the lower

lobe of the right lung.

4.  Other findings as noted, not significantly changed.





<Electronically signed by Cash Haley > 08/02/21 1044

## 2021-11-29 ENCOUNTER — HOSPITAL ENCOUNTER (OUTPATIENT)
Dept: HOSPITAL 53 - M WUC | Age: 66
End: 2021-11-29
Attending: FAMILY MEDICINE
Payer: MEDICARE

## 2021-11-29 DIAGNOSIS — R03.0: Primary | ICD-10-CM

## 2021-11-29 DIAGNOSIS — Z79.899: ICD-10-CM

## 2021-11-29 LAB
ALBUMIN SERPL BCG-MCNC: 3.8 GM/DL (ref 3.2–5.2)
ALT SERPL W P-5'-P-CCNC: 19 U/L (ref 12–78)
BASOPHILS # BLD AUTO: 0.1 10^3/UL (ref 0–0.2)
BASOPHILS NFR BLD AUTO: 0.6 % (ref 0–1)
BILIRUB SERPL-MCNC: 0.8 MG/DL (ref 0.2–1)
BUN SERPL-MCNC: 16 MG/DL (ref 7–18)
CALCIUM SERPL-MCNC: 9.7 MG/DL (ref 8.8–10.2)
CHLORIDE SERPL-SCNC: 109 MEQ/L (ref 98–107)
CHOLEST SERPL-MCNC: 184 MG/DL (ref ?–200)
CHOLEST/HDLC SERPL: 3.47 {RATIO} (ref ?–5)
CO2 SERPL-SCNC: 26 MEQ/L (ref 21–32)
CREAT SERPL-MCNC: 1.05 MG/DL (ref 0.7–1.3)
EOSINOPHIL # BLD AUTO: 0 10^3/UL (ref 0–0.5)
EOSINOPHIL NFR BLD AUTO: 0.3 % (ref 0–3)
GFR SERPL CREATININE-BSD FRML MDRD: > 60 ML/MIN/{1.73_M2} (ref 49–?)
GLUCOSE SERPL-MCNC: 93 MG/DL (ref 70–100)
HCT VFR BLD AUTO: 47.5 % (ref 42–52)
HDLC SERPL-MCNC: 53 MG/DL (ref 40–?)
HGB BLD-MCNC: 16.3 G/DL (ref 13.5–17.5)
LDLC SERPL CALC-MCNC: 114 MG/DL (ref ?–100)
LYMPHOCYTES # BLD AUTO: 1.2 10^3/UL (ref 1.5–5)
LYMPHOCYTES NFR BLD AUTO: 12.1 % (ref 24–44)
MCH RBC QN AUTO: 32.7 PG (ref 27–33)
MCHC RBC AUTO-ENTMCNC: 34.3 G/DL (ref 32–36.5)
MCV RBC AUTO: 95.4 FL (ref 80–96)
MONOCYTES # BLD AUTO: 0.9 10^3/UL (ref 0–0.8)
MONOCYTES NFR BLD AUTO: 8.9 % (ref 2–8)
NEUTROPHILS # BLD AUTO: 7.9 10^3/UL (ref 1.5–8.5)
NEUTROPHILS NFR BLD AUTO: 77.8 % (ref 36–66)
NONHDLC SERPL-MCNC: 131 MG/DL
PLATELET # BLD AUTO: 246 10^3/UL (ref 150–450)
POTASSIUM SERPL-SCNC: 4.6 MEQ/L (ref 3.5–5.1)
PROT SERPL-MCNC: 6.8 GM/DL (ref 6.4–8.2)
RBC # BLD AUTO: 4.98 10^6/UL (ref 4.3–6.1)
SODIUM SERPL-SCNC: 141 MEQ/L (ref 136–145)
TRIGL SERPL-MCNC: 86 MG/DL (ref ?–150)
WBC # BLD AUTO: 10.1 10^3/UL (ref 4–10)

## 2022-02-16 ENCOUNTER — HOSPITAL ENCOUNTER (OUTPATIENT)
Dept: HOSPITAL 53 - M PLAIMG | Age: 67
End: 2022-02-16
Attending: INTERNAL MEDICINE
Payer: MEDICARE

## 2022-02-16 DIAGNOSIS — R91.8: ICD-10-CM

## 2022-02-16 DIAGNOSIS — J47.9: Primary | ICD-10-CM

## 2022-08-22 ENCOUNTER — HOSPITAL ENCOUNTER (OUTPATIENT)
Dept: HOSPITAL 53 - M RAD | Age: 67
End: 2022-08-22
Attending: INTERNAL MEDICINE
Payer: MEDICARE

## 2022-08-22 DIAGNOSIS — R91.8: Primary | ICD-10-CM

## 2023-01-12 ENCOUNTER — HOSPITAL ENCOUNTER (OUTPATIENT)
Dept: HOSPITAL 53 - M WUC | Age: 68
End: 2023-01-12
Attending: FAMILY MEDICINE
Payer: MEDICARE

## 2023-01-12 DIAGNOSIS — E78.00: ICD-10-CM

## 2023-01-12 DIAGNOSIS — R03.0: Primary | ICD-10-CM

## 2023-01-12 LAB
ALBUMIN SERPL BCG-MCNC: 4.1 G/DL (ref 3.2–5.2)
ALP SERPL-CCNC: 77 U/L (ref 46–116)
ALT SERPL W P-5'-P-CCNC: 16 U/L (ref 7–40)
AST SERPL-CCNC: 15 U/L (ref ?–34)
BASOPHILS # BLD AUTO: 0.1 10^3/UL (ref 0–0.2)
BASOPHILS NFR BLD AUTO: 1.4 % (ref 0–1)
BILIRUB SERPL-MCNC: 0.9 MG/DL (ref 0.3–1.2)
BUN SERPL-MCNC: 27 MG/DL (ref 9–23)
CALCIUM SERPL-MCNC: 9.6 MG/DL (ref 8.3–10.6)
CHLORIDE SERPL-SCNC: 108 MMOL/L (ref 98–107)
CHOLEST SERPL-MCNC: 174 MG/DL (ref ?–200)
CHOLEST/HDLC SERPL: 3.55 {RATIO} (ref ?–5)
CO2 SERPL-SCNC: 26 MMOL/L (ref 20–31)
CREAT SERPL-MCNC: 1.15 MG/DL (ref 0.7–1.3)
EOSINOPHIL # BLD AUTO: 0.1 10^3/UL (ref 0–0.5)
EOSINOPHIL NFR BLD AUTO: 1.2 % (ref 0–3)
GFR SERPL CREATININE-BSD FRML MDRD: > 60 ML/MIN/{1.73_M2} (ref 49–?)
GLUCOSE SERPL-MCNC: 93 MG/DL (ref 74–106)
HCT VFR BLD AUTO: 48.1 % (ref 42–52)
HDLC SERPL-MCNC: 49 MG/DL (ref 40–?)
HGB BLD-MCNC: 16.3 G/DL (ref 13.5–17.5)
LDLC SERPL CALC-MCNC: 109.6 MG/DL (ref ?–100)
LYMPHOCYTES # BLD AUTO: 1.2 10^3/UL (ref 1.5–5)
LYMPHOCYTES NFR BLD AUTO: 15.6 % (ref 24–44)
MCH RBC QN AUTO: 33.3 PG (ref 27–33)
MCHC RBC AUTO-ENTMCNC: 33.9 G/DL (ref 32–36.5)
MCV RBC AUTO: 98.2 FL (ref 80–96)
MONOCYTES # BLD AUTO: 0.8 10^3/UL (ref 0–0.8)
MONOCYTES NFR BLD AUTO: 10.2 % (ref 2–8)
NEUTROPHILS # BLD AUTO: 5.2 10^3/UL (ref 1.5–8.5)
NEUTROPHILS NFR BLD AUTO: 71.3 % (ref 36–66)
NONHDLC SERPL-MCNC: 125 MG/DL
PLATELET # BLD AUTO: 219 10^3/UL (ref 150–450)
POTASSIUM SERPL-SCNC: 4.6 MMOL/L (ref 3.5–5.1)
PROT SERPL-MCNC: 6.7 G/DL (ref 5.7–8.2)
RBC # BLD AUTO: 4.9 10^6/UL (ref 4.3–6.1)
SODIUM SERPL-SCNC: 141 MMOL/L (ref 136–145)
TRIGL SERPL-MCNC: 77 MG/DL (ref ?–150)
WBC # BLD AUTO: 7.4 10^3/UL (ref 4–10)

## 2023-09-05 ENCOUNTER — HOSPITAL ENCOUNTER (OUTPATIENT)
Dept: HOSPITAL 53 - M RAD | Age: 68
End: 2023-09-05
Attending: INTERNAL MEDICINE
Payer: MEDICARE

## 2023-09-05 DIAGNOSIS — R91.8: Primary | ICD-10-CM

## 2023-09-13 ENCOUNTER — HOSPITAL ENCOUNTER (OUTPATIENT)
Dept: HOSPITAL 53 - M WUC | Age: 68
End: 2023-09-13
Attending: FAMILY MEDICINE
Payer: MEDICARE

## 2023-09-13 DIAGNOSIS — R63.4: Primary | ICD-10-CM

## 2023-09-13 LAB
ALBUMIN SERPL BCG-MCNC: 3.9 G/DL (ref 3.2–5.2)
ALP SERPL-CCNC: 86 U/L (ref 46–116)
ALT SERPL W P-5'-P-CCNC: 18 U/L (ref 7–40)
AST SERPL-CCNC: 9 U/L (ref ?–34)
BASOPHILS # BLD AUTO: 0.1 10^3/UL (ref 0–0.2)
BASOPHILS NFR BLD AUTO: 1 % (ref 0–1)
BILIRUB SERPL-MCNC: 1 MG/DL (ref 0.3–1.2)
BUN SERPL-MCNC: 17 MG/DL (ref 9–23)
CALCIUM SERPL-MCNC: 9.5 MG/DL (ref 8.3–10.6)
CHLORIDE SERPL-SCNC: 108 MMOL/L (ref 98–107)
CO2 SERPL-SCNC: 26 MMOL/L (ref 20–31)
CREAT SERPL-MCNC: 0.96 MG/DL (ref 0.7–1.3)
EOSINOPHIL # BLD AUTO: 0.1 10^3/UL (ref 0–0.5)
EOSINOPHIL NFR BLD AUTO: 0.7 % (ref 0–3)
GFR SERPL CREATININE-BSD FRML MDRD: > 60 ML/MIN/{1.73_M2} (ref 49–?)
GLUCOSE SERPL-MCNC: 101 MG/DL (ref 74–106)
HCT VFR BLD AUTO: 46.7 % (ref 42–52)
HGB BLD-MCNC: 15.8 G/DL (ref 13.5–17.5)
LYMPHOCYTES # BLD AUTO: 1 10^3/UL (ref 1.5–5)
LYMPHOCYTES NFR BLD AUTO: 11.7 % (ref 24–44)
MCH RBC QN AUTO: 32.9 PG (ref 27–33)
MCHC RBC AUTO-ENTMCNC: 33.8 G/DL (ref 32–36.5)
MCV RBC AUTO: 97.3 FL (ref 80–96)
MONOCYTES # BLD AUTO: 0.8 10^3/UL (ref 0–0.8)
MONOCYTES NFR BLD AUTO: 8.5 % (ref 2–8)
NEUTROPHILS # BLD AUTO: 6.9 10^3/UL (ref 1.5–8.5)
NEUTROPHILS NFR BLD AUTO: 77.8 % (ref 36–66)
PLATELET # BLD AUTO: 278 10^3/UL (ref 150–450)
POTASSIUM SERPL-SCNC: 4.1 MMOL/L (ref 3.5–5.1)
PROT SERPL-MCNC: 6.8 G/DL (ref 5.7–8.2)
RBC # BLD AUTO: 4.8 10^6/UL (ref 4.3–6.1)
SODIUM SERPL-SCNC: 143 MMOL/L (ref 136–145)
TSH SERPL DL<=0.005 MIU/L-ACNC: 1.32 UIU/ML (ref 0.55–4.78)
WBC # BLD AUTO: 8.8 10^3/UL (ref 4–10)

## 2023-12-21 ENCOUNTER — HOSPITAL ENCOUNTER (OUTPATIENT)
Dept: HOSPITAL 53 - M OPP | Age: 68
Discharge: HOME | End: 2023-12-21
Attending: SURGERY
Payer: MEDICARE

## 2023-12-21 VITALS — BODY MASS INDEX: 18.21 KG/M2 | HEIGHT: 70 IN | WEIGHT: 127.2 LBS

## 2023-12-21 VITALS — OXYGEN SATURATION: 97 % | DIASTOLIC BLOOD PRESSURE: 74 MMHG | SYSTOLIC BLOOD PRESSURE: 134 MMHG

## 2023-12-21 VITALS — TEMPERATURE: 96.7 F

## 2023-12-21 DIAGNOSIS — Z87.891: ICD-10-CM

## 2023-12-21 DIAGNOSIS — K21.01: ICD-10-CM

## 2023-12-21 DIAGNOSIS — D12.6: ICD-10-CM

## 2023-12-21 DIAGNOSIS — Z79.51: ICD-10-CM

## 2023-12-21 DIAGNOSIS — K22.70: ICD-10-CM

## 2023-12-21 DIAGNOSIS — K57.30: ICD-10-CM

## 2023-12-21 DIAGNOSIS — K31.89: ICD-10-CM

## 2023-12-21 DIAGNOSIS — K29.71: ICD-10-CM

## 2023-12-21 DIAGNOSIS — R63.4: ICD-10-CM

## 2023-12-21 DIAGNOSIS — Z79.1: ICD-10-CM

## 2023-12-21 DIAGNOSIS — Z86.010: Primary | ICD-10-CM

## 2023-12-21 PROCEDURE — 43239 EGD BIOPSY SINGLE/MULTIPLE: CPT

## 2023-12-21 PROCEDURE — 45385 COLONOSCOPY W/LESION REMOVAL: CPT

## 2023-12-21 PROCEDURE — 88305 TISSUE EXAM BY PATHOLOGIST: CPT

## 2024-03-18 ENCOUNTER — HOSPITAL ENCOUNTER (INPATIENT)
Dept: HOSPITAL 53 - M ED | Age: 69
LOS: 3 days | Discharge: HOME | DRG: 194 | End: 2024-03-21
Attending: STUDENT IN AN ORGANIZED HEALTH CARE EDUCATION/TRAINING PROGRAM | Admitting: STUDENT IN AN ORGANIZED HEALTH CARE EDUCATION/TRAINING PROGRAM
Payer: MEDICARE

## 2024-03-18 VITALS — DIASTOLIC BLOOD PRESSURE: 74 MMHG | OXYGEN SATURATION: 95 % | TEMPERATURE: 97.8 F | SYSTOLIC BLOOD PRESSURE: 128 MMHG

## 2024-03-18 VITALS — TEMPERATURE: 98.2 F | OXYGEN SATURATION: 94 % | SYSTOLIC BLOOD PRESSURE: 167 MMHG | DIASTOLIC BLOOD PRESSURE: 91 MMHG

## 2024-03-18 VITALS — HEIGHT: 70 IN | BODY MASS INDEX: 17.71 KG/M2 | WEIGHT: 123.68 LBS

## 2024-03-18 DIAGNOSIS — J47.9: ICD-10-CM

## 2024-03-18 DIAGNOSIS — Z90.2: ICD-10-CM

## 2024-03-18 DIAGNOSIS — Z87.442: ICD-10-CM

## 2024-03-18 DIAGNOSIS — Z79.899: ICD-10-CM

## 2024-03-18 DIAGNOSIS — J10.01: Primary | ICD-10-CM

## 2024-03-18 DIAGNOSIS — I48.91: ICD-10-CM

## 2024-03-18 DIAGNOSIS — J98.11: ICD-10-CM

## 2024-03-18 DIAGNOSIS — H91.93: ICD-10-CM

## 2024-03-18 DIAGNOSIS — J44.0: ICD-10-CM

## 2024-03-18 DIAGNOSIS — J44.1: ICD-10-CM

## 2024-03-18 DIAGNOSIS — Z86.711: ICD-10-CM

## 2024-03-18 DIAGNOSIS — Z90.49: ICD-10-CM

## 2024-03-18 DIAGNOSIS — K21.9: ICD-10-CM

## 2024-03-18 LAB
BASOPHILS # BLD AUTO: 0 10^3/UL (ref 0–0.2)
BASOPHILS NFR BLD AUTO: 0.4 % (ref 0–1)
BUN SERPL-MCNC: 27 MG/DL (ref 9–23)
CALCIUM SERPL-MCNC: 9 MG/DL (ref 8.3–10.6)
CHLORIDE SERPL-SCNC: 111 MMOL/L (ref 98–107)
CO2 SERPL-SCNC: 25 MMOL/L (ref 20–31)
CREAT SERPL-MCNC: 0.86 MG/DL (ref 0.7–1.3)
CRP SERPL-MCNC: 1.6 MG/DL (ref ?–1)
EOSINOPHIL # BLD AUTO: 0 10^3/UL (ref 0–0.5)
EOSINOPHIL NFR BLD AUTO: 0 % (ref 0–3)
GFR SERPL CREATININE-BSD FRML MDRD: > 60 ML/MIN/{1.73_M2} (ref 42–?)
GLUCOSE SERPL-MCNC: 102 MG/DL (ref 74–106)
HCT VFR BLD AUTO: 49.9 % (ref 42–52)
HGB BLD-MCNC: 17.2 G/DL (ref 13.5–17.5)
LYMPHOCYTES # BLD AUTO: 1.1 10^3/UL (ref 1.5–5)
LYMPHOCYTES NFR BLD AUTO: 13 % (ref 24–44)
MCH RBC QN AUTO: 33.6 PG (ref 27–33)
MCHC RBC AUTO-ENTMCNC: 34.5 G/DL (ref 32–36.5)
MCV RBC AUTO: 97.5 FL (ref 80–96)
MONOCYTES # BLD AUTO: 1.5 10^3/UL (ref 0–0.8)
MONOCYTES NFR BLD AUTO: 17.8 % (ref 2–8)
NEUTROPHILS # BLD AUTO: 5.6 10^3/UL (ref 1.5–8.5)
NEUTROPHILS NFR BLD AUTO: 68.3 % (ref 36–66)
PLATELET # BLD AUTO: 217 10^3/UL (ref 150–450)
POTASSIUM SERPL-SCNC: 3.8 MMOL/L (ref 3.5–5.1)
PROCALCITONIN SERPL-MCNC: <0.04 NG/ML
RBC # BLD AUTO: 5.12 10^6/UL (ref 4.3–6.1)
SODIUM SERPL-SCNC: 141 MMOL/L (ref 136–145)
WBC # BLD AUTO: 8.1 10^3/UL (ref 4–10)

## 2024-03-18 RX ADMIN — ACETYLCYSTEINE SCH MG: 200 INHALANT RESPIRATORY (INHALATION) at 20:00

## 2024-03-18 RX ADMIN — IPRATROPIUM BROMIDE AND ALBUTEROL SULFATE SCH ML: .5; 3 SOLUTION RESPIRATORY (INHALATION) at 20:32

## 2024-03-18 RX ADMIN — DOXYCYCLINE HYCLATE SCH MG: 100 TABLET, COATED ORAL at 21:19

## 2024-03-18 RX ADMIN — OSELTAMIVIR PHOSPHATE ONE MG: 75 CAPSULE ORAL at 21:19

## 2024-03-18 RX ADMIN — CEFTRIAXONE ONE MLS/HR: 1 INJECTION, POWDER, FOR SOLUTION INTRAMUSCULAR; INTRAVENOUS at 13:50

## 2024-03-18 RX ADMIN — BUDESONIDE SCH MG: 0.5 INHALANT RESPIRATORY (INHALATION) at 20:32

## 2024-03-18 RX ADMIN — METHYLPREDNISOLONE SODIUM SUCCINATE ONE MG: 125 INJECTION, POWDER, FOR SOLUTION INTRAMUSCULAR; INTRAVENOUS at 16:45

## 2024-03-18 RX ADMIN — IPRATROPIUM BROMIDE AND ALBUTEROL SULFATE SCH ML: .5; 3 SOLUTION RESPIRATORY (INHALATION) at 11:57

## 2024-03-18 RX ADMIN — SODIUM CHLORIDE ONE MLS/HR: 9 INJECTION, SOLUTION INTRAVENOUS at 12:41

## 2024-03-18 RX ADMIN — IPRATROPIUM BROMIDE AND ALBUTEROL SULFATE ONE ML: .5; 3 SOLUTION RESPIRATORY (INHALATION) at 15:37

## 2024-03-19 VITALS — OXYGEN SATURATION: 93 % | SYSTOLIC BLOOD PRESSURE: 133 MMHG | DIASTOLIC BLOOD PRESSURE: 72 MMHG | TEMPERATURE: 98.3 F

## 2024-03-19 VITALS — OXYGEN SATURATION: 94 % | SYSTOLIC BLOOD PRESSURE: 148 MMHG | DIASTOLIC BLOOD PRESSURE: 83 MMHG | TEMPERATURE: 97.2 F

## 2024-03-19 VITALS — DIASTOLIC BLOOD PRESSURE: 87 MMHG | SYSTOLIC BLOOD PRESSURE: 146 MMHG | OXYGEN SATURATION: 92 % | TEMPERATURE: 97.3 F

## 2024-03-19 VITALS — OXYGEN SATURATION: 94 %

## 2024-03-19 VITALS — OXYGEN SATURATION: 93 %

## 2024-03-19 VITALS — DIASTOLIC BLOOD PRESSURE: 71 MMHG | TEMPERATURE: 98.2 F | OXYGEN SATURATION: 93 % | SYSTOLIC BLOOD PRESSURE: 139 MMHG

## 2024-03-19 VITALS — OXYGEN SATURATION: 95 % | DIASTOLIC BLOOD PRESSURE: 68 MMHG | SYSTOLIC BLOOD PRESSURE: 108 MMHG | TEMPERATURE: 97.8 F

## 2024-03-19 VITALS — TEMPERATURE: 97.7 F | DIASTOLIC BLOOD PRESSURE: 90 MMHG | OXYGEN SATURATION: 95 % | SYSTOLIC BLOOD PRESSURE: 164 MMHG

## 2024-03-19 VITALS — OXYGEN SATURATION: 92 %

## 2024-03-19 VITALS — OXYGEN SATURATION: 91 %

## 2024-03-19 LAB
BASOPHILS # BLD AUTO: 0 10^3/UL (ref 0–0.2)
BASOPHILS NFR BLD AUTO: 0.2 % (ref 0–1)
BUN SERPL-MCNC: 31 MG/DL (ref 9–23)
CALCIUM SERPL-MCNC: 8.8 MG/DL (ref 8.3–10.6)
CHLORIDE SERPL-SCNC: 108 MMOL/L (ref 98–107)
CO2 SERPL-SCNC: 24 MMOL/L (ref 20–31)
CREAT SERPL-MCNC: 0.86 MG/DL (ref 0.7–1.3)
EOSINOPHIL # BLD AUTO: 0 10^3/UL (ref 0–0.5)
EOSINOPHIL NFR BLD AUTO: 0 % (ref 0–3)
GFR SERPL CREATININE-BSD FRML MDRD: > 60 ML/MIN/{1.73_M2} (ref 42–?)
GLUCOSE SERPL-MCNC: 145 MG/DL (ref 74–106)
HCT VFR BLD AUTO: 42.6 % (ref 42–52)
HGB BLD-MCNC: 15 G/DL (ref 13.5–17.5)
LYMPHOCYTES # BLD AUTO: 0.4 10^3/UL (ref 1.5–5)
LYMPHOCYTES NFR BLD AUTO: 8.7 % (ref 24–44)
MAGNESIUM SERPL-MCNC: 1.7 MG/DL (ref 1.8–2.4)
MCH RBC QN AUTO: 33.9 PG (ref 27–33)
MCHC RBC AUTO-ENTMCNC: 35.2 G/DL (ref 32–36.5)
MCV RBC AUTO: 96.2 FL (ref 80–96)
MONOCYTES # BLD AUTO: 0.3 10^3/UL (ref 0–0.8)
MONOCYTES NFR BLD AUTO: 6.7 % (ref 2–8)
NEUTROPHILS # BLD AUTO: 3.8 10^3/UL (ref 1.5–8.5)
NEUTROPHILS NFR BLD AUTO: 83.7 % (ref 36–66)
PLATELET # BLD AUTO: 186 10^3/UL (ref 150–450)
POTASSIUM SERPL-SCNC: 4.3 MMOL/L (ref 3.5–5.1)
RBC # BLD AUTO: 4.43 10^6/UL (ref 4.3–6.1)
SODIUM SERPL-SCNC: 138 MMOL/L (ref 136–145)
WBC # BLD AUTO: 4.5 10^3/UL (ref 4–10)

## 2024-03-19 RX ADMIN — MAGNESIUM SULFATE IN DEXTROSE ONE MLS/HR: 10 INJECTION, SOLUTION INTRAVENOUS at 09:01

## 2024-03-19 RX ADMIN — ENOXAPARIN SODIUM SCH MG: 40 INJECTION SUBCUTANEOUS at 09:02

## 2024-03-19 RX ADMIN — CEFTRIAXONE SCH MLS/HR: 2 INJECTION, POWDER, FOR SOLUTION INTRAMUSCULAR; INTRAVENOUS at 09:01

## 2024-03-19 RX ADMIN — METHYLPREDNISOLONE SODIUM SUCCINATE SCH MG: 40 INJECTION, POWDER, FOR SOLUTION INTRAMUSCULAR; INTRAVENOUS at 09:02

## 2024-03-19 RX ADMIN — OSELTAMIVIR PHOSPHATE SCH MG: 30 CAPSULE ORAL at 09:14

## 2024-03-20 VITALS — OXYGEN SATURATION: 96 %

## 2024-03-20 VITALS — TEMPERATURE: 97.3 F | OXYGEN SATURATION: 96 % | DIASTOLIC BLOOD PRESSURE: 92 MMHG | SYSTOLIC BLOOD PRESSURE: 170 MMHG

## 2024-03-20 VITALS — SYSTOLIC BLOOD PRESSURE: 129 MMHG | DIASTOLIC BLOOD PRESSURE: 75 MMHG | OXYGEN SATURATION: 97 %

## 2024-03-20 VITALS — OXYGEN SATURATION: 98 % | TEMPERATURE: 97.6 F | DIASTOLIC BLOOD PRESSURE: 69 MMHG | SYSTOLIC BLOOD PRESSURE: 148 MMHG

## 2024-03-20 VITALS — SYSTOLIC BLOOD PRESSURE: 133 MMHG | DIASTOLIC BLOOD PRESSURE: 68 MMHG | OXYGEN SATURATION: 97 %

## 2024-03-20 VITALS — SYSTOLIC BLOOD PRESSURE: 133 MMHG | OXYGEN SATURATION: 97 % | DIASTOLIC BLOOD PRESSURE: 84 MMHG

## 2024-03-20 VITALS — DIASTOLIC BLOOD PRESSURE: 56 MMHG | OXYGEN SATURATION: 95 % | SYSTOLIC BLOOD PRESSURE: 120 MMHG | TEMPERATURE: 98.4 F

## 2024-03-20 VITALS — OXYGEN SATURATION: 94 %

## 2024-03-20 VITALS — DIASTOLIC BLOOD PRESSURE: 85 MMHG | OXYGEN SATURATION: 97 % | SYSTOLIC BLOOD PRESSURE: 122 MMHG

## 2024-03-20 VITALS — SYSTOLIC BLOOD PRESSURE: 148 MMHG | DIASTOLIC BLOOD PRESSURE: 78 MMHG

## 2024-03-20 VITALS — OXYGEN SATURATION: 93 %

## 2024-03-20 VITALS — OXYGEN SATURATION: 93 % | SYSTOLIC BLOOD PRESSURE: 141 MMHG | DIASTOLIC BLOOD PRESSURE: 77 MMHG | TEMPERATURE: 98.1 F

## 2024-03-20 VITALS — DIASTOLIC BLOOD PRESSURE: 50 MMHG | SYSTOLIC BLOOD PRESSURE: 92 MMHG | OXYGEN SATURATION: 96 %

## 2024-03-20 VITALS — OXYGEN SATURATION: 98 % | DIASTOLIC BLOOD PRESSURE: 87 MMHG | SYSTOLIC BLOOD PRESSURE: 137 MMHG

## 2024-03-20 VITALS — OXYGEN SATURATION: 95 %

## 2024-03-20 VITALS — OXYGEN SATURATION: 92 %

## 2024-03-20 VITALS — OXYGEN SATURATION: 88 %

## 2024-03-20 VITALS — SYSTOLIC BLOOD PRESSURE: 144 MMHG | TEMPERATURE: 97.8 F | DIASTOLIC BLOOD PRESSURE: 92 MMHG | OXYGEN SATURATION: 96 %

## 2024-03-20 VITALS — SYSTOLIC BLOOD PRESSURE: 145 MMHG | OXYGEN SATURATION: 95 % | TEMPERATURE: 97.8 F | DIASTOLIC BLOOD PRESSURE: 70 MMHG

## 2024-03-20 VITALS — OXYGEN SATURATION: 97 %

## 2024-03-20 VITALS — OXYGEN SATURATION: 98 %

## 2024-03-20 LAB
ALBUMIN SERPL BCG-MCNC: 2.9 G/DL (ref 3.2–5.2)
ALP SERPL-CCNC: 74 U/L (ref 46–116)
ALT SERPL W P-5'-P-CCNC: 52 U/L (ref 7–40)
APTT BLD: 33 SECONDS (ref 24.8–34.2)
AST SERPL-CCNC: 29 U/L (ref ?–34)
BASOPHILS # BLD AUTO: 0 10^3/UL (ref 0–0.2)
BASOPHILS NFR BLD AUTO: 0.2 % (ref 0–1)
BILIRUB CONJ SERPL-MCNC: 0.1 MG/DL (ref ?–0.4)
BILIRUB SERPL-MCNC: 0.3 MG/DL (ref 0.3–1.2)
BUN SERPL-MCNC: 35 MG/DL (ref 9–23)
CALCIUM SERPL-MCNC: 8.6 MG/DL (ref 8.3–10.6)
CHLORIDE SERPL-SCNC: 112 MMOL/L (ref 98–107)
CO2 SERPL-SCNC: 25 MMOL/L (ref 20–31)
CREAT SERPL-MCNC: 0.88 MG/DL (ref 0.7–1.3)
EOSINOPHIL # BLD AUTO: 0 10^3/UL (ref 0–0.5)
EOSINOPHIL NFR BLD AUTO: 0 % (ref 0–3)
GFR SERPL CREATININE-BSD FRML MDRD: > 60 ML/MIN/{1.73_M2} (ref 42–?)
GLUCOSE SERPL-MCNC: 111 MG/DL (ref 74–106)
HCT VFR BLD AUTO: 41.8 % (ref 42–52)
HGB BLD-MCNC: 14.9 G/DL (ref 13.5–17.5)
INR PPP: 1.06
LYMPHOCYTES # BLD AUTO: 0.8 10^3/UL (ref 1.5–5)
LYMPHOCYTES NFR BLD AUTO: 10.3 % (ref 24–44)
MAGNESIUM SERPL-MCNC: 1.9 MG/DL (ref 1.8–2.4)
MCH RBC QN AUTO: 33.6 PG (ref 27–33)
MCHC RBC AUTO-ENTMCNC: 35.6 G/DL (ref 32–36.5)
MCV RBC AUTO: 94.4 FL (ref 80–96)
MONOCYTES # BLD AUTO: 1 10^3/UL (ref 0–0.8)
MONOCYTES NFR BLD AUTO: 12.5 % (ref 2–8)
NEUTROPHILS # BLD AUTO: 6.2 10^3/UL (ref 1.5–8.5)
NEUTROPHILS NFR BLD AUTO: 76.4 % (ref 36–66)
PLATELET # BLD AUTO: 175 10^3/UL (ref 150–450)
POTASSIUM SERPL-SCNC: 4.1 MMOL/L (ref 3.5–5.1)
PROT SERPL-MCNC: 5.7 G/DL (ref 5.7–8.2)
PROTHROMBIN TIME: 13.5 SECONDS (ref 12.5–14.5)
RBC # BLD AUTO: 4.43 10^6/UL (ref 4.3–6.1)
SODIUM SERPL-SCNC: 141 MMOL/L (ref 136–145)
WBC # BLD AUTO: 8.1 10^3/UL (ref 4–10)

## 2024-03-20 RX ADMIN — APIXABAN SCH MG: 5 TABLET, FILM COATED ORAL at 21:11

## 2024-03-20 RX ADMIN — METOPROLOL TARTRATE STA MG: 5 INJECTION INTRAVENOUS at 08:02

## 2024-03-20 RX ADMIN — METOPROLOL TARTRATE SCH MG: 50 TABLET, FILM COATED ORAL at 09:37

## 2024-03-20 RX ADMIN — ACETAMINOPHEN PRN MG: 325 TABLET ORAL at 10:43

## 2024-03-21 VITALS — OXYGEN SATURATION: 97 % | DIASTOLIC BLOOD PRESSURE: 80 MMHG | SYSTOLIC BLOOD PRESSURE: 144 MMHG | TEMPERATURE: 98.3 F

## 2024-03-21 VITALS — OXYGEN SATURATION: 97 %

## 2024-03-21 VITALS — DIASTOLIC BLOOD PRESSURE: 85 MMHG | SYSTOLIC BLOOD PRESSURE: 128 MMHG

## 2024-03-21 VITALS — OXYGEN SATURATION: 94 %

## 2024-03-21 VITALS — OXYGEN SATURATION: 93 %

## 2024-03-21 VITALS — TEMPERATURE: 98 F | OXYGEN SATURATION: 95 % | SYSTOLIC BLOOD PRESSURE: 153 MMHG | DIASTOLIC BLOOD PRESSURE: 82 MMHG

## 2024-03-21 VITALS — OXYGEN SATURATION: 96 %

## 2024-03-21 VITALS — DIASTOLIC BLOOD PRESSURE: 85 MMHG | TEMPERATURE: 97.3 F | OXYGEN SATURATION: 95 % | SYSTOLIC BLOOD PRESSURE: 128 MMHG

## 2024-03-21 VITALS — OXYGEN SATURATION: 98 %

## 2024-03-21 LAB
BASOPHILS # BLD AUTO: 0 10^3/UL (ref 0–0.2)
BASOPHILS NFR BLD AUTO: 0.1 % (ref 0–1)
BUN SERPL-MCNC: 41 MG/DL (ref 9–23)
CALCIUM SERPL-MCNC: 8.7 MG/DL (ref 8.3–10.6)
CHLORIDE SERPL-SCNC: 113 MMOL/L (ref 98–107)
CO2 SERPL-SCNC: 26 MMOL/L (ref 20–31)
CREAT SERPL-MCNC: 0.98 MG/DL (ref 0.7–1.3)
EOSINOPHIL # BLD AUTO: 0 10^3/UL (ref 0–0.5)
EOSINOPHIL NFR BLD AUTO: 0.1 % (ref 0–3)
GFR SERPL CREATININE-BSD FRML MDRD: > 60 ML/MIN/{1.73_M2} (ref 42–?)
GLUCOSE SERPL-MCNC: 90 MG/DL (ref 74–106)
HCT VFR BLD AUTO: 40.4 % (ref 42–52)
HGB BLD-MCNC: 14.2 G/DL (ref 13.5–17.5)
LYMPHOCYTES # BLD AUTO: 1 10^3/UL (ref 1.5–5)
LYMPHOCYTES NFR BLD AUTO: 13.3 % (ref 24–44)
MAGNESIUM SERPL-MCNC: 1.7 MG/DL (ref 1.8–2.4)
MCH RBC QN AUTO: 33.5 PG (ref 27–33)
MCHC RBC AUTO-ENTMCNC: 35.1 G/DL (ref 32–36.5)
MCV RBC AUTO: 95.3 FL (ref 80–96)
MONOCYTES # BLD AUTO: 0.9 10^3/UL (ref 0–0.8)
MONOCYTES NFR BLD AUTO: 11.4 % (ref 2–8)
NEUTROPHILS # BLD AUTO: 5.5 10^3/UL (ref 1.5–8.5)
NEUTROPHILS NFR BLD AUTO: 73.8 % (ref 36–66)
PLATELET # BLD AUTO: 188 10^3/UL (ref 150–450)
POTASSIUM SERPL-SCNC: 4 MMOL/L (ref 3.5–5.1)
RBC # BLD AUTO: 4.24 10^6/UL (ref 4.3–6.1)
SODIUM SERPL-SCNC: 143 MMOL/L (ref 136–145)
WBC # BLD AUTO: 7.5 10^3/UL (ref 4–10)

## 2024-03-21 RX ADMIN — MAGNESIUM SULFATE IN DEXTROSE ONE MLS/HR: 10 INJECTION, SOLUTION INTRAVENOUS at 09:44

## 2024-03-22 LAB
BACTERIA ID TEST ISLT QL CULT: (no result)
BACTERIA SPEC BFLD CULT: (no result)

## 2024-04-06 ENCOUNTER — HOSPITAL ENCOUNTER (OUTPATIENT)
Dept: HOSPITAL 53 - M ED | Age: 69
Setting detail: OBSERVATION
LOS: 1 days | Discharge: HOME | End: 2024-04-07
Attending: INTERNAL MEDICINE | Admitting: INTERNAL MEDICINE
Payer: MEDICARE

## 2024-04-06 VITALS — TEMPERATURE: 97.9 F | SYSTOLIC BLOOD PRESSURE: 135 MMHG | DIASTOLIC BLOOD PRESSURE: 78 MMHG | OXYGEN SATURATION: 95 %

## 2024-04-06 VITALS — HEIGHT: 70 IN | WEIGHT: 127.21 LBS | BODY MASS INDEX: 18.21 KG/M2

## 2024-04-06 DIAGNOSIS — Z86.711: ICD-10-CM

## 2024-04-06 DIAGNOSIS — Z90.89: ICD-10-CM

## 2024-04-06 DIAGNOSIS — J47.9: ICD-10-CM
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
## 2024-04-06 DIAGNOSIS — Z87.442: ICD-10-CM

## 2024-04-06 DIAGNOSIS — I10: ICD-10-CM

## 2024-04-06 DIAGNOSIS — T17.900A: ICD-10-CM

## 2024-04-06 DIAGNOSIS — R26.81: ICD-10-CM

## 2024-04-06 DIAGNOSIS — Z87.09: ICD-10-CM

## 2024-04-06 DIAGNOSIS — Z79.899: ICD-10-CM

## 2024-04-06 DIAGNOSIS — K21.9: ICD-10-CM

## 2024-04-06 DIAGNOSIS — R91.1: ICD-10-CM

## 2024-04-06 DIAGNOSIS — J43.9: ICD-10-CM

## 2024-04-06 DIAGNOSIS — D71: ICD-10-CM

## 2024-04-06 DIAGNOSIS — R06.02: ICD-10-CM

## 2024-04-06 DIAGNOSIS — U07.1: Primary | ICD-10-CM

## 2024-04-06 DIAGNOSIS — I48.91: ICD-10-CM

## 2024-04-06 DIAGNOSIS — J44.9: ICD-10-CM

## 2024-04-06 DIAGNOSIS — Z90.49: ICD-10-CM

## 2024-04-06 DIAGNOSIS — R53.1: ICD-10-CM

## 2024-04-06 DIAGNOSIS — Z90.2: ICD-10-CM

## 2024-04-06 DIAGNOSIS — H91.93: ICD-10-CM

## 2024-04-06 LAB
ALBUMIN SERPL BCG-MCNC: 3.1 G/DL (ref 3.2–5.2)
ALP SERPL-CCNC: 79 U/L (ref 46–116)
ALT SERPL W P-5'-P-CCNC: 26 U/L (ref 7–40)
APTT BLD: 28 SECONDS (ref 24.8–34.2)
AST SERPL-CCNC: 21 U/L (ref ?–34)
BASOPHILS # BLD AUTO: 0 10^3/UL (ref 0–0.2)
BASOPHILS NFR BLD AUTO: 0.5 % (ref 0–1)
BILIRUB CONJ SERPL-MCNC: 0.3 MG/DL (ref ?–0.4)
BILIRUB SERPL-MCNC: 1.1 MG/DL (ref 0.3–1.2)
BUN SERPL-MCNC: 23 MG/DL (ref 9–23)
CALCIUM SERPL-MCNC: 9.7 MG/DL (ref 8.3–10.6)
CHLORIDE SERPL-SCNC: 107 MMOL/L (ref 98–107)
CK MB CFR.DF SERPL CALC: 3.22
CK MB CFR.DF SERPL CALC: 4.16
CK MB SERPL-MCNC: < 1 NG/ML (ref ?–3.6)
CK MB SERPL-MCNC: < 1 NG/ML (ref ?–3.6)
CK SERPL-CCNC: 24 U/L (ref 46–171)
CK SERPL-CCNC: 31 U/L (ref 46–171)
CO2 SERPL-SCNC: 27 MMOL/L (ref 20–31)
CREAT SERPL-MCNC: 1.22 MG/DL (ref 0.7–1.3)
CRP SERPL-MCNC: 4.2 MG/DL (ref ?–1)
CRP SERPL-MCNC: 4.6 MG/DL (ref ?–1)
D DIMER PPP DDU-MCNC: 1.55 UG/ML (ref ?–0.5)
EOSINOPHIL # BLD AUTO: 0 10^3/UL (ref 0–0.5)
EOSINOPHIL NFR BLD AUTO: 0.6 % (ref 0–3)
FERRITIN SERPL-MCNC: 381.7 NG/ML (ref 10.5–307.3)
FIBRINOGEN PPP-MCNC: 569 MG/DL (ref 268–480)
GFR SERPL CREATININE-BSD FRML MDRD: > 60 ML/MIN/{1.73_M2} (ref 42–?)
GLUCOSE SERPL-MCNC: 101 MG/DL (ref 74–106)
HCT VFR BLD AUTO: 46.5 % (ref 42–52)
HGB BLD-MCNC: 16.1 G/DL (ref 13.5–17.5)
INR PPP: 1
INR PPP: 1.02
LDH SERPL L TO P-CCNC: 155 U/L (ref 120–246)
LYMPHOCYTES # BLD AUTO: 0.4 10^3/UL (ref 1.5–5)
LYMPHOCYTES NFR BLD AUTO: 6.8 % (ref 24–44)
MAGNESIUM SERPL-MCNC: 1.8 MG/DL (ref 1.8–2.4)
MCH RBC QN AUTO: 33.9 PG (ref 27–33)
MCHC RBC AUTO-ENTMCNC: 34.6 G/DL (ref 32–36.5)
MCV RBC AUTO: 97.9 FL (ref 80–96)
MONOCYTES # BLD AUTO: 0.9 10^3/UL (ref 0–0.8)
MONOCYTES NFR BLD AUTO: 13.5 % (ref 2–8)
NEUTROPHILS # BLD AUTO: 4.9 10^3/UL (ref 1.5–8.5)
NEUTROPHILS NFR BLD AUTO: 77.5 % (ref 36–66)
PLATELET # BLD AUTO: 172 10^3/UL (ref 150–450)
POTASSIUM SERPL-SCNC: 4.4 MMOL/L (ref 3.5–5.1)
PROCALCITONIN SERPL-MCNC: 0.04 NG/ML
PROCALCITONIN SERPL-MCNC: 0.04 NG/ML
PROT SERPL-MCNC: 6.1 G/DL (ref 5.7–8.2)
PROTHROMBIN TIME: 12.9 SECONDS (ref 12.5–14.5)
PROTHROMBIN TIME: 13.1 SECONDS (ref 12.5–14.5)
RBC # BLD AUTO: 4.75 10^6/UL (ref 4.3–6.1)
SODIUM SERPL-SCNC: 135 MMOL/L (ref 136–145)
T4 FREE SERPL-MCNC: 1.33 NG/DL (ref 0.89–1.76)
TSH SERPL DL<=0.005 MIU/L-ACNC: 1.46 UIU/ML (ref 0.55–4.78)
WBC # BLD AUTO: 6.3 10^3/UL (ref 4–10)

## 2024-04-06 PROCEDURE — 85610 PROTHROMBIN TIME: CPT

## 2024-04-06 PROCEDURE — 94760 N-INVAS EAR/PLS OXIMETRY 1: CPT

## 2024-04-06 PROCEDURE — 84439 ASSAY OF FREE THYROXINE: CPT

## 2024-04-06 PROCEDURE — 84145 PROCALCITONIN (PCT): CPT

## 2024-04-06 PROCEDURE — 85025 COMPLETE CBC W/AUTO DIFF WBC: CPT

## 2024-04-06 PROCEDURE — 96375 TX/PRO/DX INJ NEW DRUG ADDON: CPT

## 2024-04-06 PROCEDURE — 94667 MNPJ CHEST WALL 1ST: CPT

## 2024-04-06 PROCEDURE — 85027 COMPLETE CBC AUTOMATED: CPT

## 2024-04-06 PROCEDURE — 87040 BLOOD CULTURE FOR BACTERIA: CPT

## 2024-04-06 PROCEDURE — 82728 ASSAY OF FERRITIN: CPT

## 2024-04-06 PROCEDURE — 80076 HEPATIC FUNCTION PANEL: CPT

## 2024-04-06 PROCEDURE — 83605 ASSAY OF LACTIC ACID: CPT

## 2024-04-06 PROCEDURE — 84443 ASSAY THYROID STIM HORMONE: CPT

## 2024-04-06 PROCEDURE — 99285 EMERGENCY DEPT VISIT HI MDM: CPT

## 2024-04-06 PROCEDURE — 36415 COLL VENOUS BLD VENIPUNCTURE: CPT

## 2024-04-06 PROCEDURE — 83880 ASSAY OF NATRIURETIC PEPTIDE: CPT

## 2024-04-06 PROCEDURE — 85379 FIBRIN DEGRADATION QUANT: CPT

## 2024-04-06 PROCEDURE — 80053 COMPREHEN METABOLIC PANEL: CPT

## 2024-04-06 PROCEDURE — 96361 HYDRATE IV INFUSION ADD-ON: CPT

## 2024-04-06 PROCEDURE — 96365 THER/PROPH/DIAG IV INF INIT: CPT

## 2024-04-06 PROCEDURE — 82553 CREATINE MB FRACTION: CPT

## 2024-04-06 PROCEDURE — 71045 X-RAY EXAM CHEST 1 VIEW: CPT

## 2024-04-06 PROCEDURE — 87581 M.PNEUMON DNA AMP PROBE: CPT

## 2024-04-06 PROCEDURE — 94640 AIRWAY INHALATION TREATMENT: CPT

## 2024-04-06 PROCEDURE — 82550 ASSAY OF CK (CPK): CPT

## 2024-04-06 PROCEDURE — 84484 ASSAY OF TROPONIN QUANT: CPT

## 2024-04-06 PROCEDURE — 87633 RESP VIRUS 12-25 TARGETS: CPT

## 2024-04-06 PROCEDURE — 83735 ASSAY OF MAGNESIUM: CPT

## 2024-04-06 PROCEDURE — 93005 ELECTROCARDIOGRAM TRACING: CPT

## 2024-04-06 PROCEDURE — 86140 C-REACTIVE PROTEIN: CPT

## 2024-04-06 PROCEDURE — 83615 LACTATE (LD) (LDH) ENZYME: CPT

## 2024-04-06 PROCEDURE — 85384 FIBRINOGEN ACTIVITY: CPT

## 2024-04-06 PROCEDURE — 96366 THER/PROPH/DIAG IV INF ADDON: CPT

## 2024-04-06 PROCEDURE — 87798 DETECT AGENT NOS DNA AMP: CPT

## 2024-04-06 PROCEDURE — 97161 PT EVAL LOW COMPLEX 20 MIN: CPT

## 2024-04-06 PROCEDURE — 80048 BASIC METABOLIC PNL TOTAL CA: CPT

## 2024-04-06 PROCEDURE — 87486 CHLMYD PNEUM DNA AMP PROBE: CPT

## 2024-04-06 PROCEDURE — 93041 RHYTHM ECG TRACING: CPT

## 2024-04-06 PROCEDURE — 85730 THROMBOPLASTIN TIME PARTIAL: CPT

## 2024-04-06 PROCEDURE — 71260 CT THORAX DX C+: CPT

## 2024-04-06 RX ADMIN — METOPROLOL TARTRATE SCH MG: 50 TABLET, FILM COATED ORAL at 21:16

## 2024-04-06 RX ADMIN — IPRATROPIUM BROMIDE SCH PUFF: 17 AEROSOL, METERED RESPIRATORY (INHALATION) at 20:26

## 2024-04-06 RX ADMIN — SODIUM CHLORIDE ONE MLS/HR: 9 INJECTION, SOLUTION INTRAVENOUS at 18:35

## 2024-04-06 RX ADMIN — DEXAMETHASONE SODIUM PHOSPHATE ONE MG: 4 INJECTION, SOLUTION INTRAMUSCULAR; INTRAVENOUS at 18:35

## 2024-04-06 RX ADMIN — SODIUM CHLORIDE ONE MLS/HR: 9 INJECTION, SOLUTION INTRAVENOUS at 13:51

## 2024-04-06 RX ADMIN — SALMETEROL XINAFOATE SCH PUFF: 50 POWDER, METERED ORAL; RESPIRATORY (INHALATION) at 20:00

## 2024-04-06 RX ADMIN — APIXABAN SCH MG: 5 TABLET, FILM COATED ORAL at 21:16

## 2024-04-07 VITALS — OXYGEN SATURATION: 96 % | DIASTOLIC BLOOD PRESSURE: 76 MMHG | TEMPERATURE: 97.5 F | SYSTOLIC BLOOD PRESSURE: 120 MMHG

## 2024-04-07 VITALS — SYSTOLIC BLOOD PRESSURE: 125 MMHG | TEMPERATURE: 97.7 F | OXYGEN SATURATION: 96 % | DIASTOLIC BLOOD PRESSURE: 76 MMHG

## 2024-04-07 VITALS — SYSTOLIC BLOOD PRESSURE: 125 MMHG | DIASTOLIC BLOOD PRESSURE: 76 MMHG

## 2024-04-07 VITALS — OXYGEN SATURATION: 90 %

## 2024-04-07 VITALS — OXYGEN SATURATION: 96 %

## 2024-04-07 VITALS — OXYGEN SATURATION: 93 %

## 2024-04-07 LAB
ALBUMIN SERPL BCG-MCNC: 2.7 G/DL (ref 3.2–5.2)
ALP SERPL-CCNC: 69 U/L (ref 46–116)
ALT SERPL W P-5'-P-CCNC: 26 U/L (ref 7–40)
AST SERPL-CCNC: 16 U/L (ref ?–34)
BILIRUB SERPL-MCNC: 0.7 MG/DL (ref 0.3–1.2)
BUN SERPL-MCNC: 23 MG/DL (ref 9–23)
CALCIUM SERPL-MCNC: 8.4 MG/DL (ref 8.3–10.6)
CHLORIDE SERPL-SCNC: 107 MMOL/L (ref 98–107)
CO2 SERPL-SCNC: 23 MMOL/L (ref 20–31)
CREAT SERPL-MCNC: 0.93 MG/DL (ref 0.7–1.3)
GFR SERPL CREATININE-BSD FRML MDRD: > 60 ML/MIN/{1.73_M2} (ref 42–?)
GLUCOSE SERPL-MCNC: 139 MG/DL (ref 74–106)
HCT VFR BLD AUTO: 42.2 % (ref 42–52)
HGB BLD-MCNC: 14.6 G/DL (ref 13.5–17.5)
MCH RBC QN AUTO: 33.3 PG (ref 27–33)
MCHC RBC AUTO-ENTMCNC: 34.6 G/DL (ref 32–36.5)
MCV RBC AUTO: 96.3 FL (ref 80–96)
PLATELET # BLD AUTO: 167 10^3/UL (ref 150–450)
POTASSIUM SERPL-SCNC: 4.5 MMOL/L (ref 3.5–5.1)
PROT SERPL-MCNC: 5.6 G/DL (ref 5.7–8.2)
RBC # BLD AUTO: 4.38 10^6/UL (ref 4.3–6.1)
SODIUM SERPL-SCNC: 136 MMOL/L (ref 136–145)
WBC # BLD AUTO: 3.3 10^3/UL (ref 4–10)

## 2024-04-07 RX ADMIN — TIOTROPIUM BROMIDE SCH INHALATION: 18 CAPSULE ORAL; RESPIRATORY (INHALATION) at 07:42

## 2024-05-19 ENCOUNTER — HOSPITAL ENCOUNTER (EMERGENCY)
Dept: HOSPITAL 53 - M ED | Age: 69
LOS: 1 days | Discharge: HOME | End: 2024-05-20
Payer: MEDICARE

## 2024-05-19 VITALS — HEIGHT: 70 IN | BODY MASS INDEX: 17.93 KG/M2 | WEIGHT: 125.27 LBS

## 2024-05-19 DIAGNOSIS — R00.0: ICD-10-CM

## 2024-05-19 DIAGNOSIS — I10: ICD-10-CM

## 2024-05-19 DIAGNOSIS — I48.0: Primary | ICD-10-CM

## 2024-05-19 DIAGNOSIS — Z79.51: ICD-10-CM

## 2024-05-19 DIAGNOSIS — Z79.52: ICD-10-CM

## 2024-05-19 DIAGNOSIS — Z79.899: ICD-10-CM

## 2024-05-19 DIAGNOSIS — J44.9: ICD-10-CM

## 2024-05-19 LAB
ALBUMIN SERPL BCG-MCNC: 3.1 G/DL (ref 3.2–5.2)
ALP SERPL-CCNC: 80 U/L (ref 46–116)
ALT SERPL W P-5'-P-CCNC: 22 U/L (ref 7–40)
APTT BLD: 33.7 SECONDS (ref 24.8–34.2)
AST SERPL-CCNC: 18 U/L (ref ?–34)
BASOPHILS # BLD AUTO: 0 10^3/UL (ref 0–0.2)
BASOPHILS NFR BLD AUTO: 0.3 % (ref 0–1)
BILIRUB CONJ SERPL-MCNC: 0.4 MG/DL (ref ?–0.4)
BILIRUB SERPL-MCNC: 1.4 MG/DL (ref 0.3–1.2)
BUN SERPL-MCNC: 34 MG/DL (ref 9–23)
CALCIUM SERPL-MCNC: 9.8 MG/DL (ref 8.3–10.6)
CHLORIDE SERPL-SCNC: 107 MMOL/L (ref 98–107)
CK MB CFR.DF SERPL CALC: 4
CK MB CFR.DF SERPL CALC: 4.34
CK MB SERPL-MCNC: < 1 NG/ML (ref ?–3.6)
CK MB SERPL-MCNC: < 1 NG/ML (ref ?–3.6)
CK SERPL-CCNC: 23 U/L (ref 46–171)
CK SERPL-CCNC: 25 U/L (ref 46–171)
CO2 SERPL-SCNC: 22 MMOL/L (ref 20–31)
CREAT SERPL-MCNC: 1.26 MG/DL (ref 0.7–1.3)
EOSINOPHIL # BLD AUTO: 0 10^3/UL (ref 0–0.5)
EOSINOPHIL NFR BLD AUTO: 0.1 % (ref 0–3)
GFR SERPL CREATININE-BSD FRML MDRD: > 60 ML/MIN/{1.73_M2} (ref 42–?)
GLUCOSE SERPL-MCNC: 157 MG/DL (ref 74–106)
HCT VFR BLD AUTO: 42.5 % (ref 42–52)
HGB BLD-MCNC: 15 G/DL (ref 13.5–17.5)
INR PPP: 1.38
LIPASE SERPL-CCNC: 32 U/L (ref 12–53)
LYMPHOCYTES # BLD AUTO: 0.8 10^3/UL (ref 1.5–5)
LYMPHOCYTES NFR BLD AUTO: 7.6 % (ref 24–44)
MAGNESIUM SERPL-MCNC: 1.8 MG/DL (ref 1.8–2.4)
MCH RBC QN AUTO: 33.9 PG (ref 27–33)
MCHC RBC AUTO-ENTMCNC: 35.3 G/DL (ref 32–36.5)
MCV RBC AUTO: 95.9 FL (ref 80–96)
MONOCYTES # BLD AUTO: 0.9 10^3/UL (ref 0–0.8)
MONOCYTES NFR BLD AUTO: 8.4 % (ref 2–8)
NEUTROPHILS # BLD AUTO: 9.1 10^3/UL (ref 1.5–8.5)
NEUTROPHILS NFR BLD AUTO: 82.9 % (ref 36–66)
PLATELET # BLD AUTO: 245 10^3/UL (ref 150–450)
POTASSIUM SERPL-SCNC: 4.4 MMOL/L (ref 3.5–5.1)
PROT SERPL-MCNC: 6.3 G/DL (ref 5.7–8.2)
PROTHROMBIN TIME: 16.5 SECONDS (ref 12.5–14.5)
RBC # BLD AUTO: 4.43 10^6/UL (ref 4.3–6.1)
SODIUM SERPL-SCNC: 138 MMOL/L (ref 136–145)
T4 FREE SERPL-MCNC: 1.42 NG/DL (ref 0.89–1.76)
TSH SERPL DL<=0.005 MIU/L-ACNC: 1.07 UIU/ML (ref 0.55–4.78)
WBC # BLD AUTO: 11 10^3/UL (ref 4–10)

## 2024-05-19 PROCEDURE — 80076 HEPATIC FUNCTION PANEL: CPT

## 2024-05-19 PROCEDURE — 80048 BASIC METABOLIC PNL TOTAL CA: CPT

## 2024-05-19 PROCEDURE — 83880 ASSAY OF NATRIURETIC PEPTIDE: CPT

## 2024-05-19 PROCEDURE — 83735 ASSAY OF MAGNESIUM: CPT

## 2024-05-19 PROCEDURE — 85730 THROMBOPLASTIN TIME PARTIAL: CPT

## 2024-05-19 PROCEDURE — 85610 PROTHROMBIN TIME: CPT

## 2024-05-19 PROCEDURE — 83690 ASSAY OF LIPASE: CPT

## 2024-05-19 PROCEDURE — 71045 X-RAY EXAM CHEST 1 VIEW: CPT

## 2024-05-19 PROCEDURE — 96374 THER/PROPH/DIAG INJ IV PUSH: CPT

## 2024-05-19 PROCEDURE — 82553 CREATINE MB FRACTION: CPT

## 2024-05-19 PROCEDURE — 99285 EMERGENCY DEPT VISIT HI MDM: CPT

## 2024-05-19 PROCEDURE — 93005 ELECTROCARDIOGRAM TRACING: CPT

## 2024-05-19 PROCEDURE — 84439 ASSAY OF FREE THYROXINE: CPT

## 2024-05-19 PROCEDURE — 94760 N-INVAS EAR/PLS OXIMETRY 1: CPT

## 2024-05-19 PROCEDURE — 84484 ASSAY OF TROPONIN QUANT: CPT

## 2024-05-19 PROCEDURE — 82550 ASSAY OF CK (CPK): CPT

## 2024-05-19 PROCEDURE — 84443 ASSAY THYROID STIM HORMONE: CPT

## 2024-05-19 PROCEDURE — 96375 TX/PRO/DX INJ NEW DRUG ADDON: CPT

## 2024-05-19 PROCEDURE — 93041 RHYTHM ECG TRACING: CPT

## 2024-05-19 PROCEDURE — 85025 COMPLETE CBC W/AUTO DIFF WBC: CPT

## 2024-05-19 RX ADMIN — DIGOXIN ONE MG: 0.25 INJECTION INTRAMUSCULAR; INTRAVENOUS at 20:53

## 2024-05-19 RX ADMIN — DIGOXIN ONE MG: 0.25 INJECTION INTRAMUSCULAR; INTRAVENOUS at 22:26

## 2024-05-20 VITALS — OXYGEN SATURATION: 96 % | TEMPERATURE: 97.6 F

## 2024-05-20 VITALS — DIASTOLIC BLOOD PRESSURE: 83 MMHG | SYSTOLIC BLOOD PRESSURE: 156 MMHG

## 2024-06-21 ENCOUNTER — HOSPITAL ENCOUNTER (OUTPATIENT)
Dept: HOSPITAL 53 - M EKG | Age: 69
End: 2024-06-21
Attending: INTERNAL MEDICINE
Payer: MEDICARE

## 2024-06-21 DIAGNOSIS — I48.0: Primary | ICD-10-CM

## 2024-06-24 ENCOUNTER — HOSPITAL ENCOUNTER (OUTPATIENT)
Dept: HOSPITAL 53 - M PLAIMG | Age: 69
End: 2024-06-24
Attending: INTERNAL MEDICINE
Payer: MEDICARE

## 2024-06-24 DIAGNOSIS — I48.0: Primary | ICD-10-CM

## 2024-06-26 ENCOUNTER — HOSPITAL ENCOUNTER (OUTPATIENT)
Dept: HOSPITAL 53 - M LAB REF | Age: 69
End: 2024-06-26
Attending: FAMILY MEDICINE
Payer: MEDICARE

## 2024-06-26 DIAGNOSIS — R31.0: Primary | ICD-10-CM

## 2024-08-15 ENCOUNTER — HOSPITAL ENCOUNTER (OUTPATIENT)
Dept: HOSPITAL 53 - M PLAIMG | Age: 69
End: 2024-08-15
Attending: INTERNAL MEDICINE
Payer: MEDICARE

## 2024-08-15 DIAGNOSIS — R91.8: ICD-10-CM

## 2024-08-15 DIAGNOSIS — J47.9: Primary | ICD-10-CM

## 2024-12-17 ENCOUNTER — HOSPITAL ENCOUNTER (OUTPATIENT)
Dept: HOSPITAL 53 - M PLAIMG | Age: 69
End: 2024-12-17
Attending: INTERNAL MEDICINE
Payer: MEDICARE

## 2024-12-17 DIAGNOSIS — J43.9: Primary | ICD-10-CM

## 2024-12-31 ENCOUNTER — HOSPITAL ENCOUNTER (OUTPATIENT)
Dept: HOSPITAL 53 - M WUC | Age: 69
End: 2024-12-31
Attending: FAMILY MEDICINE
Payer: MEDICARE

## 2024-12-31 DIAGNOSIS — I48.0: Primary | ICD-10-CM

## 2024-12-31 LAB
BUN SERPL-MCNC: 35 MG/DL (ref 9–23)
CALCIUM SERPL-MCNC: 10.3 MG/DL (ref 8.3–10.6)
CHLORIDE SERPL-SCNC: 107 MMOL/L (ref 98–107)
CO2 SERPL-SCNC: 27 MMOL/L (ref 20–31)
CREAT SERPL-MCNC: 1.29 MG/DL (ref 0.7–1.3)
GFR SERPL CREATININE-BSD FRML MDRD: 58.6 ML/MIN/{1.73_M2} (ref 42–?)
GLUCOSE SERPL-MCNC: 107 MG/DL (ref 74–106)
MAGNESIUM SERPL-MCNC: 2.2 MG/DL (ref 1.8–2.4)
POTASSIUM SERPL-SCNC: 5 MMOL/L (ref 3.5–5.1)
SODIUM SERPL-SCNC: 143 MMOL/L (ref 136–145)

## 2025-02-04 ENCOUNTER — HOSPITAL ENCOUNTER (OUTPATIENT)
Dept: HOSPITAL 53 - M EKG | Age: 70
End: 2025-02-04
Attending: INTERNAL MEDICINE
Payer: MEDICARE

## 2025-02-04 DIAGNOSIS — I48.0: Primary | ICD-10-CM

## 2025-03-06 ENCOUNTER — HOSPITAL ENCOUNTER (EMERGENCY)
Dept: HOSPITAL 53 - M ED | Age: 70
Discharge: HOME | End: 2025-03-06
Payer: MEDICARE

## 2025-03-06 VITALS — WEIGHT: 139.99 LBS | BODY MASS INDEX: 20.04 KG/M2 | HEIGHT: 70 IN

## 2025-03-06 VITALS — OXYGEN SATURATION: 94 % | SYSTOLIC BLOOD PRESSURE: 189 MMHG | DIASTOLIC BLOOD PRESSURE: 80 MMHG | TEMPERATURE: 97.9 F

## 2025-03-06 DIAGNOSIS — Z87.891: ICD-10-CM

## 2025-03-06 DIAGNOSIS — I48.91: ICD-10-CM

## 2025-03-06 DIAGNOSIS — J44.9: ICD-10-CM

## 2025-03-06 DIAGNOSIS — Z79.899: ICD-10-CM

## 2025-03-06 DIAGNOSIS — Z79.52: ICD-10-CM

## 2025-03-06 DIAGNOSIS — I45.10: ICD-10-CM

## 2025-03-06 DIAGNOSIS — R06.02: Primary | ICD-10-CM

## 2025-03-06 DIAGNOSIS — I10: ICD-10-CM

## 2025-03-06 DIAGNOSIS — Z79.51: ICD-10-CM

## 2025-03-06 DIAGNOSIS — E87.5: ICD-10-CM

## 2025-03-06 DIAGNOSIS — Z79.01: ICD-10-CM

## 2025-03-06 LAB
ALBUMIN SERPL BCG-MCNC: 3.5 G/DL (ref 3.2–5.2)
ALP SERPL-CCNC: 74 U/L (ref 40–129)
ALT SERPL W P-5'-P-CCNC: 22 U/L (ref 7–40)
AST SERPL-CCNC: 17 U/L (ref ?–34)
BASOPHILS # BLD AUTO: 0.1 10^3/UL (ref 0–0.2)
BASOPHILS NFR BLD AUTO: 1 % (ref 0–1)
BILIRUB CONJ SERPL-MCNC: 0.3 MG/DL (ref ?–0.4)
BILIRUB SERPL-MCNC: 1.1 MG/DL (ref 0.3–1.2)
BUN SERPL-MCNC: 30 MG/DL (ref 9–23)
CALCIUM SERPL-MCNC: 9.7 MG/DL (ref 8.3–10.6)
CHLORIDE SERPL-SCNC: 111 MMOL/L (ref 98–107)
CK MB CFR.DF SERPL CALC: 2.5
CK MB CFR.DF SERPL CALC: 2.76
CK MB SERPL-MCNC: 1.3 NG/ML (ref ?–3.6)
CK MB SERPL-MCNC: 1.3 NG/ML (ref ?–3.6)
CK SERPL-CCNC: 47 U/L (ref 46–171)
CK SERPL-CCNC: 52 U/L (ref 46–171)
CO2 SERPL-SCNC: 26 MMOL/L (ref 20–31)
CREAT SERPL-MCNC: 1.22 MG/DL (ref 0.7–1.3)
EOSINOPHIL # BLD AUTO: 0 10^3/UL (ref 0–0.5)
EOSINOPHIL NFR BLD AUTO: 0.1 % (ref 0–3)
GFR SERPL CREATININE-BSD FRML MDRD: > 60 ML/MIN/{1.73_M2} (ref 42–?)
GLUCOSE SERPL-MCNC: 135 MG/DL (ref 74–106)
HCT VFR BLD AUTO: 46.6 % (ref 42–52)
HGB BLD-MCNC: 15.7 G/DL (ref 13.5–17.5)
INR PPP: 1.28
LYMPHOCYTES # BLD AUTO: 0.5 10^3/UL (ref 1.5–5)
LYMPHOCYTES NFR BLD AUTO: 5.4 % (ref 24–44)
MCH RBC QN AUTO: 33.4 PG (ref 27–33)
MCHC RBC AUTO-ENTMCNC: 33.7 G/DL (ref 32–36.5)
MCV RBC AUTO: 99.1 FL (ref 80–96)
MONOCYTES # BLD AUTO: 0.3 10^3/UL (ref 0–0.8)
MONOCYTES NFR BLD AUTO: 3.6 % (ref 2–8)
NEUTROPHILS # BLD AUTO: 8 10^3/UL (ref 1.5–8.5)
NEUTROPHILS NFR BLD AUTO: 88.3 % (ref 36–66)
PLATELET # BLD AUTO: 232 10^3/UL (ref 150–450)
POTASSIUM SERPL-SCNC: 5.3 MMOL/L (ref 3.5–5.1)
PROT SERPL-MCNC: 6.6 G/DL (ref 5.7–8.2)
PROTHROMBIN TIME: 16.2 SECONDS (ref 12.5–14.5)
RBC # BLD AUTO: 4.7 10^6/UL (ref 4.3–6.1)
SODIUM SERPL-SCNC: 146 MMOL/L (ref 136–145)
TSH SERPL DL<=0.005 MIU/L-ACNC: 0.62 UIU/ML (ref 0.55–4.78)
WBC # BLD AUTO: 9.1 10^3/UL (ref 4–10)

## 2025-03-06 PROCEDURE — 84484 ASSAY OF TROPONIN QUANT: CPT

## 2025-03-06 PROCEDURE — 82553 CREATINE MB FRACTION: CPT

## 2025-03-06 PROCEDURE — 71046 X-RAY EXAM CHEST 2 VIEWS: CPT

## 2025-03-06 PROCEDURE — 85610 PROTHROMBIN TIME: CPT

## 2025-03-06 PROCEDURE — 82550 ASSAY OF CK (CPK): CPT

## 2025-03-06 PROCEDURE — 87798 DETECT AGENT NOS DNA AMP: CPT

## 2025-03-06 PROCEDURE — 94760 N-INVAS EAR/PLS OXIMETRY 1: CPT

## 2025-03-06 PROCEDURE — 96375 TX/PRO/DX INJ NEW DRUG ADDON: CPT

## 2025-03-06 PROCEDURE — 96365 THER/PROPH/DIAG IV INF INIT: CPT

## 2025-03-06 PROCEDURE — 83880 ASSAY OF NATRIURETIC PEPTIDE: CPT

## 2025-03-06 PROCEDURE — 80048 BASIC METABOLIC PNL TOTAL CA: CPT

## 2025-03-06 PROCEDURE — 80076 HEPATIC FUNCTION PANEL: CPT

## 2025-03-06 PROCEDURE — 85025 COMPLETE CBC W/AUTO DIFF WBC: CPT

## 2025-03-06 PROCEDURE — 93005 ELECTROCARDIOGRAM TRACING: CPT

## 2025-03-06 PROCEDURE — 87581 M.PNEUMON DNA AMP PROBE: CPT

## 2025-03-06 PROCEDURE — 99285 EMERGENCY DEPT VISIT HI MDM: CPT

## 2025-03-06 PROCEDURE — 87486 CHLMYD PNEUM DNA AMP PROBE: CPT

## 2025-03-06 PROCEDURE — 87040 BLOOD CULTURE FOR BACTERIA: CPT

## 2025-03-06 PROCEDURE — 84443 ASSAY THYROID STIM HORMONE: CPT

## 2025-03-06 PROCEDURE — 93041 RHYTHM ECG TRACING: CPT

## 2025-03-06 PROCEDURE — 71275 CT ANGIOGRAPHY CHEST: CPT

## 2025-03-06 PROCEDURE — 96366 THER/PROPH/DIAG IV INF ADDON: CPT

## 2025-03-06 PROCEDURE — 87633 RESP VIRUS 12-25 TARGETS: CPT

## 2025-03-06 RX ADMIN — INSULIN HUMAN ONE UNITS: 100 INJECTION, SOLUTION PARENTERAL at 15:00

## 2025-03-06 RX ADMIN — PATIROMER ONE GM: 8.4 POWDER, FOR SUSPENSION ORAL at 15:08

## 2025-03-06 RX ADMIN — DEXTROSE MONOHYDRATE STA ML: 25 INJECTION, SOLUTION INTRAVENOUS at 14:59

## 2025-03-06 RX ADMIN — AZITHROMYCIN MONOHYDRATE ONE MG: 250 TABLET ORAL at 16:48
